# Patient Record
Sex: FEMALE | Race: WHITE | NOT HISPANIC OR LATINO | Employment: OTHER | URBAN - METROPOLITAN AREA
[De-identification: names, ages, dates, MRNs, and addresses within clinical notes are randomized per-mention and may not be internally consistent; named-entity substitution may affect disease eponyms.]

---

## 2017-06-27 ENCOUNTER — HOSPITAL ENCOUNTER (OUTPATIENT)
Dept: FAMILY MEDICINE CLINIC | Facility: CLINIC | Age: 27
Setting detail: SPECIMEN
Discharge: HOME | End: 2017-06-27
Attending: FAMILY MEDICINE | Admitting: FAMILY MEDICINE

## 2017-06-27 LAB
ANION GAP SERPL CALC-SCNC: 11.2 MMOL/L (ref 10–20)
BACTERIA SPEC AEROBE CULT: NORMAL
BACTERIA SPEC AEROBE CULT: NORMAL
BASOPHILS # BLD AUTO: 0 10*3/UL (ref 0–0.2)
BASOPHILS NFR BLD AUTO: 0 % (ref 0–2)
BUN SERPL-MCNC: 11 MG/DL (ref 8–20)
BUN/CREAT SERPL: 11 (ref 5.4–26.2)
CALCIUM SERPL-MCNC: 9.1 MG/DL (ref 8.9–10.3)
CHLORIDE SERPL-SCNC: 104 MMOL/L (ref 101–111)
COLONY COUNT: NORMAL
CONV CO2: 30 MMOL/L (ref 22–32)
CREAT UR-MCNC: 1 MG/DL (ref 0.4–1)
DIFFERENTIAL METHOD BLD: (no result)
EOSINOPHIL # BLD AUTO: 0 10*3/UL (ref 0–0.3)
EOSINOPHIL # BLD AUTO: 1 % (ref 0–3)
ERYTHROCYTE [DISTWIDTH] IN BLOOD BY AUTOMATED COUNT: 13.8 % (ref 11.5–14.5)
GLUCOSE SERPL-MCNC: 79 MG/DL (ref 65–99)
HCT VFR BLD AUTO: 37.2 % (ref 35–49)
HGB BLD-MCNC: 12.7 G/DL (ref 12–15)
LYMPHOCYTES # BLD AUTO: 1.5 10*3/UL (ref 0.8–4.8)
LYMPHOCYTES NFR BLD AUTO: 24 % (ref 18–42)
Lab: NORMAL
MCH RBC QN AUTO: 31 PG (ref 26–32)
MCHC RBC AUTO-ENTMCNC: 34.2 G/DL (ref 32–36)
MCV RBC AUTO: 90.6 FL (ref 80–94)
MICRO REPORT STATUS: NORMAL
MONOCYTES # BLD AUTO: 0.4 10*3/UL (ref 0.1–1.3)
MONOCYTES NFR BLD AUTO: 7 % (ref 2–11)
NEUTROPHILS # BLD AUTO: 4.2 10*3/UL (ref 2.3–8.6)
NEUTROPHILS NFR BLD AUTO: 68 % (ref 50–75)
NRBC BLD AUTO-RTO: 0 /100{WBCS}
NRBC/RBC NFR BLD MANUAL: 0 10*3/UL
PLATELET # BLD AUTO: 351 10*3/UL (ref 150–450)
PMV BLD AUTO: 9.1 FL (ref 7.4–10.4)
POTASSIUM SERPL-SCNC: 4.2 MMOL/L (ref 3.6–5.1)
RBC # BLD AUTO: 4.11 10*6/UL (ref 4–5.4)
SODIUM SERPL-SCNC: 141 MMOL/L (ref 136–144)
SPECIMEN SOURCE: NORMAL
WBC # BLD AUTO: 6.2 10*3/UL (ref 4.5–11.5)

## 2017-11-20 ENCOUNTER — HOSPITAL ENCOUNTER (OUTPATIENT)
Dept: URGENT CARE | Facility: CLINIC | Age: 27
Setting detail: SPECIMEN
Discharge: HOME | End: 2017-11-20
Attending: FAMILY MEDICINE | Admitting: FAMILY MEDICINE

## 2017-11-20 LAB
BACTERIA SPEC AEROBE CULT: NORMAL
C TRACH RRNA SPEC QL PROBE: NORMAL
Lab: NORMAL
MICRO REPORT STATUS: NORMAL
N GONORRHOEA RRNA SPEC QL PROBE: NORMAL
SPECIMEN SOURCE: NORMAL

## 2017-11-21 LAB
CONV HIV-1/ HIV-2: NORMAL
CONV HIV-1/ HIV-2: NORMAL

## 2017-11-22 LAB
HBV SURFACE AG SERPL QL IA: NONREACTIVE
HCV AB SER DONR QL: NORMAL
HCV AB SER DONR QL: NORMAL
HSV1 DNA SPEC QL NAA+PROBE: NEGATIVE
SPECIMEN SOURCE: NORMAL
T PALLIDUM IGG SER QL: NONREACTIVE

## 2017-11-27 LAB — HSV1 IGM TITR SER IF: NEGATIVE {TITER}

## 2017-11-30 ENCOUNTER — HOSPITAL ENCOUNTER (OUTPATIENT)
Dept: FAMILY MEDICINE CLINIC | Facility: CLINIC | Age: 27
Setting detail: SPECIMEN
Discharge: HOME | End: 2017-11-30
Attending: FAMILY MEDICINE | Admitting: FAMILY MEDICINE

## 2017-11-30 LAB
BACTERIA SPEC AEROBE CULT: NORMAL
Lab: NORMAL
MICRO REPORT STATUS: NORMAL
SPECIMEN SOURCE: NORMAL

## 2018-02-08 ENCOUNTER — HOSPITAL ENCOUNTER (OUTPATIENT)
Dept: ORTHOPEDIC SURGERY | Facility: CLINIC | Age: 28
Discharge: HOME | End: 2018-02-08
Attending: ORTHOPAEDIC SURGERY | Admitting: ORTHOPAEDIC SURGERY

## 2018-04-16 ENCOUNTER — HOSPITAL ENCOUNTER (OUTPATIENT)
Dept: INFUSION THERAPY | Facility: HOSPITAL | Age: 28
Discharge: HOME | End: 2018-04-16
Attending: OBSTETRICS & GYNECOLOGY | Admitting: OBSTETRICS & GYNECOLOGY

## 2018-04-25 ENCOUNTER — HOSPITAL ENCOUNTER (OUTPATIENT)
Dept: INFUSION THERAPY | Facility: HOSPITAL | Age: 28
Discharge: HOME | End: 2018-04-25
Attending: OBSTETRICS & GYNECOLOGY | Admitting: OBSTETRICS & GYNECOLOGY

## 2018-05-02 ENCOUNTER — HOSPITAL ENCOUNTER (OUTPATIENT)
Dept: INFUSION THERAPY | Facility: HOSPITAL | Age: 28
Discharge: HOME | End: 2018-05-02
Attending: OBSTETRICS & GYNECOLOGY | Admitting: OBSTETRICS & GYNECOLOGY

## 2019-06-24 RX ORDER — ALPRAZOLAM 0.5 MG/1
TABLET ORAL
Qty: 90 TABLET | Refills: 0 | OUTPATIENT
Start: 2019-06-24

## 2019-07-02 ENCOUNTER — OFFICE VISIT (OUTPATIENT)
Dept: PSYCHIATRY | Facility: CLINIC | Age: 29
End: 2019-07-02

## 2019-07-02 DIAGNOSIS — F20.0 SCHIZOPHRENIA, PARANOID (HCC): Primary | ICD-10-CM

## 2019-07-02 DIAGNOSIS — F63.3 TRICHOTILLOMANIA: ICD-10-CM

## 2019-07-02 DIAGNOSIS — Z91.14 POOR COMPLIANCE WITH MEDICATION: ICD-10-CM

## 2019-07-02 DIAGNOSIS — F41.1 GENERALIZED ANXIETY DISORDER: ICD-10-CM

## 2019-07-02 PROBLEM — Z72.51 HIGH RISK SEXUAL BEHAVIOR: Status: ACTIVE | Noted: 2017-11-20

## 2019-07-02 PROBLEM — R10.13 ABDOMINAL PAIN, EPIGASTRIC: Status: ACTIVE | Noted: 2017-06-20

## 2019-07-02 PROBLEM — Z91.148 POOR COMPLIANCE WITH MEDICATION: Status: ACTIVE | Noted: 2019-02-21

## 2019-07-02 PROBLEM — M25.561 KNEE PAIN, RIGHT: Status: ACTIVE | Noted: 2018-01-23

## 2019-07-02 PROBLEM — F41.9 ANXIETY DISORDER: Status: ACTIVE | Noted: 2018-11-07

## 2019-07-02 PROBLEM — N76.0 VAGINITIS: Status: ACTIVE | Noted: 2017-11-20

## 2019-07-02 PROBLEM — Z30.9 CONTRACEPTIVE MANAGEMENT: Status: ACTIVE | Noted: 2018-01-23

## 2019-07-02 PROBLEM — A60.00 GENITAL HERPES: Status: ACTIVE | Noted: 2017-11-20

## 2019-07-02 PROBLEM — R30.0 DIFFICULT OR PAINFUL URINATION: Status: ACTIVE | Noted: 2017-11-30

## 2019-07-02 PROBLEM — N10 PYELONEPHRITIS, ACUTE: Status: ACTIVE | Noted: 2017-06-20

## 2019-07-02 PROBLEM — N92.6 IRREGULAR MENSTRUATION: Status: ACTIVE | Noted: 2017-11-30

## 2019-07-02 PROBLEM — G89.4 CHRONIC PAIN DISORDER: Status: ACTIVE | Noted: 2017-09-11

## 2019-07-02 PROCEDURE — 99213 OFFICE O/P EST LOW 20 MIN: CPT | Performed by: PHYSICIAN ASSISTANT

## 2019-07-02 RX ORDER — HYDROXYZINE 50 MG/1
50 TABLET, FILM COATED ORAL
COMMUNITY
Start: 2018-07-13 | End: 2019-07-02

## 2019-07-02 RX ORDER — ASENAPINE 10 MG/1
10 TABLET SUBLINGUAL
Qty: 30 TABLET | Refills: 1 | Status: SHIPPED | OUTPATIENT
Start: 2019-07-02 | End: 2019-08-28

## 2019-07-02 RX ORDER — DICLOXACILLIN SODIUM 250 MG/1
250 CAPSULE ORAL 4 TIMES DAILY
COMMUNITY
Start: 2018-07-19 | End: 2021-01-20

## 2019-07-02 RX ORDER — ALPRAZOLAM 0.5 MG/1
0.5 TABLET ORAL 3 TIMES DAILY PRN
Qty: 90 TABLET | Refills: 0 | Status: SHIPPED | OUTPATIENT
Start: 2019-07-02 | End: 2019-09-18 | Stop reason: SDUPTHER

## 2019-07-02 RX ORDER — BUPRENORPHINE HYDROCHLORIDE 8 MG/1
12 TABLET SUBLINGUAL DAILY
COMMUNITY
Start: 2018-08-01 | End: 2019-08-28

## 2019-07-02 RX ORDER — HYDROXYZINE PAMOATE 50 MG/1
1 CAPSULE ORAL EVERY 12 HOURS
COMMUNITY
Start: 2019-06-05 | End: 2021-01-20

## 2019-07-02 NOTE — PROGRESS NOTES
"Subjective   Annmarie Perera is a 28 y.o.white female who presents today for follow up    Chief Complaint:  Anxiety and schizophrenia    History of Present Illness: Paranoid, people keep looking at her, police keep coming to question her   \"The government is dirty people\"  \"People keep playing with my mind over artwork\"  She says she has been taking the Saphris at night and helping some but not reliable  +[facility]    The following portions of the patient's history were reviewed and updated as appropriate: allergies, current medications, past family history, past medical history, past social history, past surgical history and problem list.    PAST PSYCHIATRIC HISTORY  Axis I  Affective/Bipoloar Disorder, Anxiety/Panic Disorder, Substance Abuse Disorder, Schizophrenia  Axis II  None    PAST OUTPATIENT TREATMENT  Diagnosis treated:  Affective Disorder, Anxiety/Panic Disorder, Substance/Alcohol Abuse  Treatment Type:  Intensive Outpatient Therapy, Individual Therapy, Group Therapy  Prior Psychiatric Medications:  lamictal    lexapro  risperidone - she did not like how she was feeling so she stopped all of them   neurontin   prozac, zoloft, lexapro did not like , no emotions  abilify - not sure  clonazepam - effective   vraylar - increased agitation   Palperidone caused hands and arms to draw up    Past Hospitalizations or Residential Treatment   Diagnosis: suicide attempt, major depression, psychosis  Locations\Providers: [facility]   [facility], [facility]leen Cho   [facility] [id number]       Support Groups:  Narcotics Anonymous (NA)  Sequelae Of Mental Disorder:  suicide attempt, job disruption, financial hardships, emotional distress          Interval History  Deteriorated    Side Effects  None       Past Medical History:  Past Medical History:   Diagnosis Date   • Abdominal pain, epigastric 06/20/2017   • Anxiety disorder 11/07/2018   • Borderline personality disorder (CMS/HCC)    • Chronic pain syndrome 09/11/2017   • " Contraceptive management 01/23/2018   • Depression     under care of psych   • Dysuria 11/30/2017   • Genital herpes 11/20/2017   • High risk sexual behavior 11/20/2017   • HPV (human papilloma virus) anogenital infection    • Irregular menstruation 11/30/2017    refer to OB/GYN for further management 11-   • Panic disorder    • Poor compliance with medication 02/21/2019   • Positive skin test for tuberculosis     Tuberculosis positive   • Psychiatric illness    • Pyelonephritis, acute 06/20/2017    Improved 06-   • Right knee pain 01/23/2018    Discussed symptomatic treatment, activity as tolerate. 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/2016   • Vaginitis 11/20/2017   • Withdrawal symptoms, drug or narcotic (CMS/[facility])        Social History:  Social History     Socioeconomic History   • Marital status: Single     Spouse name: Not on file   • Number of children: Not on file   • Years of education: Not on file   • Highest education level: Not on file   Tobacco Use   • Smoking status: Current Every Day Smoker     Types: Cigarettes   • Tobacco comment: Passive Smoke: Y   Substance and Sexual Activity   • Alcohol use: No     Frequency: Never   • Drug use: Yes     Types: Cocaine   • Sexual activity: No       Family History:  History reviewed. No pertinent family history.    Past Surgical History:  Past Surgical History:   Procedure Laterality Date   • PAP SMEAR  2017    [facility]       Problem List:  Patient Active Problem List   Diagnosis   • Abdominal pain, epigastric   • Anxiety disorder   • Chronic pain disorder   • Contraceptive management   • Difficult or painful urination   • Genital herpes   • High risk sexual behavior   • Irregular menstruation   • Knee pain, right   • Poor compliance with medication   • Pyelonephritis,  acute   • Vaginitis   • Schizophrenia, paranoid (CMS/HCC)   • Trichotillomania       Allergy:   Allergies   Allergen Reactions   • Haldol [Haloperidol] Other (See Comments) and Mental Status Change     [facility] [place]         Discontinued Medications:  Medications Discontinued During This Encounter   Medication Reason   • hydrOXYzine (ATARAX) 50 MG tablet *Error   • asenapine maleate (SAPHRIS) 5 MG sublingual tablet sublingual tablet Dose adjustment   • ALPRAZolam (XANAX) 0.5 MG tablet Reorder       Current Medications:   Current Outpatient Medications   Medication Sig Dispense Refill   • ALPRAZolam (XANAX) 0.5 MG tablet Take 1 tablet by mouth 3 (Three) Times a Day As Needed for Anxiety (take one tablet three times as needed for anxiety). 90 tablet 0   • asenapine maleate (SAPHRIS) 10 MG sublingual tablet sublingual tablet Place 1 tablet under the tongue every night at bedtime. 30 tablet 1   • buprenorphine (SUBUTEX) 8 MG sublingual tablet SL tablet Place 12 mg under the tongue Daily.     • Desvenlafaxine Succinate ER (PRISTIQ) 25 MG tablet sustained-release 24 hour Take 25 mg by mouth Daily. Take one tablet daily for depression and anxiety     • dicloxacillin (DYNAPEN) 250 MG capsule Take 250 mg by mouth 4 (Four) Times a Day.     • hydrOXYzine pamoate (VISTARIL) 50 MG capsule 1 capsule Every 12 (Twelve) Hours.     • valACYclovir (VALTREX) 1000 MG tablet Take  by mouth Daily. Take one tablet by mouth daily       No current facility-administered medications for this visit.          Review of Symptoms:    Psychiatric/Behavioral: Negative for agitation, behavioral problems, confusion, decreased concentration, dysphoric mood, hallucinations, self-injury, sleep disturbance and suicidal ideas. The patient is not nervous/anxious and is not hyperactive.        Physical Exam:   There were no vitals taken for this visit.    Mental Status Exam:   Hygiene:   fair  Cooperation:  Suspicious  Eye Contact:   Good  Psychomotor Behavior:  Restless  Affect:  Blunted  Mood: irritable  Hopelessness: Denies  Speech:  Normal  Thought Process:  Goal directed  Thought Content:  Paranoid  Suicidal:  None  Homicidal:  None  Hallucinations:  Auditory  Delusion:  Paranoid  Memory:  Intact  Orientation:  Person, Place, Time and Situation  Reliability:  poor  Insight:  Poor  Judgement:  Poor  Impulse Control:  Fair  Physical/Medical Issues:  No      PHQ-9 Score:  PHQ-9 Score: 13      Current every day smoker less than 3 minutes spent counseling Has reduced Tobbacos use    I advised Annmarie of the risks of tobacco use.     Lab Results:   No visits with results within 3 Month(s) from this visit.   Latest known visit with results is:   Hospital Outpatient Visit on 11/30/2017   Component Date Value Ref Range Status   • Specimen Description: 11/30/2017 URINE   Final   • Special Requests 11/30/2017 SPEC REFRIG   Final   • Culture 11/30/2017 NO GROWTH 2 DAYS   Final   • Report Status 11/30/2017 12/02/2017 FINAL   Final       Assessment/Plan   Problems Addressed this Visit        Other    Anxiety disorder    Relevant Medications    hydrOXYzine pamoate (VISTARIL) 50 MG capsule    asenapine maleate (SAPHRIS) 10 MG sublingual tablet sublingual tablet    ALPRAZolam (XANAX) 0.5 MG tablet    Poor compliance with medication    Relevant Medications    asenapine maleate (SAPHRIS) 10 MG sublingual tablet sublingual tablet    Schizophrenia, paranoid (CMS/HCC) - Primary    Relevant Medications    hydrOXYzine pamoate (VISTARIL) 50 MG capsule    asenapine maleate (SAPHRIS) 10 MG sublingual tablet sublingual tablet    ALPRAZolam (XANAX) 0.5 MG tablet    Trichotillomania    Relevant Medications    hydrOXYzine pamoate (VISTARIL) 50 MG capsule    asenapine maleate (SAPHRIS) 10 MG sublingual tablet sublingual tablet    ALPRAZolam (XANAX) 0.5 MG tablet          Visit Diagnoses:    ICD-10-CM ICD-9-CM   1. Schizophrenia, paranoid (CMS/HCC) F20.0 295.30   2.  Trichotillomania F63.3 312.39   3. Poor compliance with medication Z91.14 V15.81   4. Generalized anxiety disorder F41.1 300.02       TREATMENT PLAN/GOALS: Continue supportive psychotherapy efforts and medications as indicated. Treatment and medication options discussed during today's visit. Patient ackowledged and verbally consented to continue with current treatment plan and was educated on the importance of compliance with treatment and follow-up appointments.    Patient is cooperative but having conversation with someone else, she says she is talking to herself.  She reports that she has been compliant with the Saphris at night, but not sure that this is reliable.  She would benefit from an injectable antipsychotic but she cannot take Abilify or Invega due to reactions in the past.  We will increase the Saphris to 10 mg at bedtime sublingual and refill Xanax only once until she is seen again in 1 month.  Oral swab done today for drug screen.  May consider a trial of Haldol, then later switched to Haldol decanoate injection.      MEDICATION ISSUES:  INSPECT reviewed as expected  Discussed medication options and treatment plan of prescribed medication as well as the risks, benefits, and side effects including potential falls, possible impaired driving and metabolic adversities among others. Patient is agreeable to call the office with any worsening of symptoms or onset of side effects. Patient is agreeable to call 911 or go to the nearest ER should he/she begin having SI/HI. No medication side effects or related complaints today.     MEDS ORDERED DURING VISIT:  New Medications Ordered This Visit   Medications   • asenapine maleate (SAPHRIS) 10 MG sublingual tablet sublingual tablet     Sig: Place 1 tablet under the tongue every night at bedtime.     Dispense:  30 tablet     Refill:  1   • ALPRAZolam (XANAX) 0.5 MG tablet     Sig: Take 1 tablet by mouth 3 (Three) Times a Day As Needed for Anxiety (take one tablet  three times as needed for anxiety).     Dispense:  90 tablet     Refill:  0       Return in about 4 weeks (around 7/30/2019) for Recheck.         This document has been electronically signed by Batool Carter PA-C  July 2, 2019 2:15 PM

## 2019-07-22 ENCOUNTER — TELEPHONE (OUTPATIENT)
Dept: PSYCHIATRY | Facility: CLINIC | Age: 29
End: 2019-07-22

## 2019-08-01 DIAGNOSIS — F41.1 GENERALIZED ANXIETY DISORDER: ICD-10-CM

## 2019-08-01 DIAGNOSIS — F63.3 TRICHOTILLOMANIA: ICD-10-CM

## 2019-08-04 RX ORDER — ALPRAZOLAM 0.5 MG/1
TABLET ORAL
Qty: 90 TABLET | Refills: 0 | OUTPATIENT
Start: 2019-08-04

## 2019-08-07 DIAGNOSIS — F41.1 GENERALIZED ANXIETY DISORDER: ICD-10-CM

## 2019-08-07 DIAGNOSIS — F63.3 TRICHOTILLOMANIA: ICD-10-CM

## 2019-08-07 RX ORDER — ALPRAZOLAM 0.5 MG/1
TABLET ORAL
Qty: 90 TABLET | Refills: 0 | OUTPATIENT
Start: 2019-08-07

## 2019-08-09 ENCOUNTER — TELEPHONE (OUTPATIENT)
Dept: PSYCHIATRY | Facility: CLINIC | Age: 29
End: 2019-08-09

## 2019-08-09 NOTE — TELEPHONE ENCOUNTER
RODRIGO DAO FROM [employer]  REACHED OUT TO CONTACT PATIENT SINCE HER DISCHARGE FROM [facility] July 19.  PATIENT HAD RAPID SPEECH, DOES NOT UNDERSTAND WHY THE POLICE KEEP COMING TO HER HOUSE SINCE THEY'VE TAKEN HER CHILDREN AWAY.  SHE IS STAYING WITH HER PARENTS, ATTENDING COUNSELING, AND AGREED TO A  TO BE ASSIGNED TO HER

## 2019-08-09 NOTE — TELEPHONE ENCOUNTER
PATIENT CALLED UPSET POLCE COMING TO HOUSE, THEY'VE ALREADY TAKEN 2 OF HER CHILDREN, SHE'S NOT DOING ANYTHING WRONG, SHE IS TAKING HER MEDS BUT STILL HEARS VOICES, SHE THINKS THE POLICE THINKS SHE IS A PSYCHO PATH BUT SHE'S NOT, SHE DID SAY SHE IS NOT WANTING TO HURT HER SELF OR OTHERS.  CALLED WANTED YOU TO BE AWARE OF WHAT IS GOING ON

## 2019-08-12 NOTE — TELEPHONE ENCOUNTER
Please see if you can get ahold of the rep from Medicaid.  I am not sure what to do with Annmarie b/c she is not compliant with coming in for follow ups, drug screens or taking her medications.

## 2019-08-14 ENCOUNTER — TELEPHONE (OUTPATIENT)
Dept: PSYCHIATRY | Facility: CLINIC | Age: 29
End: 2019-08-14

## 2019-08-15 ENCOUNTER — TELEPHONE (OUTPATIENT)
Dept: PSYCHIATRY | Facility: CLINIC | Age: 29
End: 2019-08-15

## 2019-08-15 NOTE — TELEPHONE ENCOUNTER
I spoke with Jeremiah Romano [employer] at [phone]   she will try to reach out to the patient and encourage her to be compliant and come for her appointments. Jeremiah will also  introduce her to [employer] for transportation to and from appointments and help set up outpatient therapy other than that she doesn't know what else she can do. But she will follow up

## 2019-08-21 ENCOUNTER — TELEPHONE (OUTPATIENT)
Dept: PSYCHIATRY | Facility: CLINIC | Age: 29
End: 2019-08-21

## 2019-08-21 DIAGNOSIS — F41.1 GENERALIZED ANXIETY DISORDER: ICD-10-CM

## 2019-08-21 DIAGNOSIS — F63.3 TRICHOTILLOMANIA: ICD-10-CM

## 2019-08-22 RX ORDER — ALPRAZOLAM 0.5 MG/1
TABLET ORAL
Qty: 90 TABLET | Refills: 0 | OUTPATIENT
Start: 2019-08-22

## 2019-08-27 PROBLEM — F11.93 ACUTE OPIOID WITHDRAWAL: Status: ACTIVE | Noted: 2018-07-09

## 2019-08-27 PROBLEM — Z72.0 TOBACCO USE: Status: ACTIVE | Noted: 2018-07-10

## 2019-08-27 PROBLEM — F20.9 SCHIZOPHRENIA: Status: ACTIVE | Noted: 2018-07-10

## 2019-08-28 ENCOUNTER — OFFICE VISIT (OUTPATIENT)
Dept: PSYCHIATRY | Facility: CLINIC | Age: 29
End: 2019-08-28

## 2019-08-28 DIAGNOSIS — F41.1 GENERALIZED ANXIETY DISORDER: ICD-10-CM

## 2019-08-28 DIAGNOSIS — Z91.14 POOR COMPLIANCE WITH MEDICATION: ICD-10-CM

## 2019-08-28 DIAGNOSIS — F63.3 TRICHOTILLOMANIA: Primary | ICD-10-CM

## 2019-08-28 DIAGNOSIS — F20.0 SCHIZOPHRENIA, PARANOID (HCC): ICD-10-CM

## 2019-08-28 PROCEDURE — 99213 OFFICE O/P EST LOW 20 MIN: CPT | Performed by: PHYSICIAN ASSISTANT

## 2019-08-28 RX ORDER — ASENAPINE 10 MG/1
10 TABLET SUBLINGUAL 2 TIMES DAILY
Qty: 60 TABLET | Refills: 1 | Status: SHIPPED | OUTPATIENT
Start: 2019-08-28 | End: 2021-01-20

## 2019-08-28 NOTE — PROGRESS NOTES
"Subjective   Annmarie Perera is a 28 y.o.white female who presents today for follow up    Chief Complaint:  Hallucinations, paranoia    History of Present Illness:   CPS is \"harassing her\" about the baby  Ghosts are talking to her, \"want me to play with myself\"  Positive auditory and visual hallucinations  Irritable but no aggression  No Si/HI  C/o anxiety  Says she is taking the Saphris as directed with the Vistaril b/c \"they are making me\", stopped the Pristiq, says it wasn't helping      The following portions of the patient's history were reviewed and updated as appropriate: allergies, current medications, past family history, past medical history, past social history, past surgical history and problem list.    PAST PSYCHIATRIC HISTORY  Axis I  Affective/Bipoloar Disorder, Anxiety/Panic Disorder, Substance Abuse Disorder, Cognitive Disorder  Axis II  None    PAST OUTPATIENT TREATMENT  Diagnosis treated:  Affective Disorder, Addictive Disorder, Cognitive Disorder, Substance/Alcohol Abuse  Treatment Type:  Intensive Outpatient Therapy, Individual Therapy, Group Therapy, Medication Management  Prior Psychiatric Medications:  lamictal    lexapro  risperidone - she did not like how she was feeling so she stopped all of them   neurontin   prozac, zoloft, lexapro did not like , no emotions  abilify - not sure  clonazepam - effective   vraylar - increased agitation   Palperidone caused hands and arms to draw up  Support Groups:  Narcotics Anonymous (NA)  Sequelae Of Mental Disorder:  suicide attempt, arrest, social isolation, family disruption, financial hardships, emotional distress      Interval History  Deteriorated    Side Effects  None      Past Medical History:  Past Medical History:   Diagnosis Date   • Abdominal pain, epigastric 06/20/2017   • Anxiety disorder 11/07/2018   • Borderline personality disorder (CMS/HCC)    • Chronic pain syndrome 09/11/2017   • Contraceptive management 01/23/2018   • Depression     " under care of psych   • Dysuria 11/30/2017   • Genital herpes 11/20/2017   • High risk sexual behavior 11/20/2017   • HPV (human papilloma virus) anogenital infection    • Irregular menstruation 11/30/2017    refer to OB/GYN for further management 11-   • Panic disorder    • Poor compliance with medication 02/21/2019   • Positive skin test for tuberculosis     Tuberculosis positive   • Psychiatric illness    • Pyelonephritis, acute 06/20/2017    Improved 06-   • Right knee pain 01/23/2018    Discussed symptomatic treatment, activity as tolerate. 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   • Vaginitis 11/20/2017   • Withdrawal symptoms, drug or narcotic (CMS/[facility])        Social History:  Social History     Socioeconomic History   • Marital status: Single     Spouse name: Not on file   • Number of children: Not on file   • Years of education: Not on file   • Highest education level: Not on file   Tobacco Use   • Smoking status: Current Every Day Smoker     Types: Cigarettes   • Tobacco comment: Passive Smoke: Y   Substance and Sexual Activity   • Alcohol use: No     Frequency: Never   • Drug use: Yes     Types: Cocaine   • Sexual activity: No       Family History:  History reviewed. No pertinent family history.    Past Surgical History:  Past Surgical History:   Procedure Laterality Date   • PAP SMEAR  2017    [facility]       Problem List:  Patient Active Problem List   Diagnosis   • Abdominal pain, epigastric   • Anxiety disorder   • Chronic pain disorder   • Contraceptive management   • Difficult or painful urination   • Genital herpes   • High risk sexual behavior   • Irregular menstruation   • Knee pain, right   • Poor compliance with medication   • Pyelonephritis, acute   • Vaginitis   • Schizophrenia, paranoid (CMS/HCC)    • Trichotillomania   • Tobacco use   • Acute opioid withdrawal (CMS/HCC)   • Schizophrenia (CMS/HCC)       Allergy:   Allergies   Allergen Reactions   • Haldol [Haloperidol] Other (See Comments) and Mental Status Change     [facility]         Discontinued Medications:  Medications Discontinued During This Encounter   Medication Reason   • buprenorphine (SUBUTEX) 8 MG sublingual tablet SL tablet *Therapy completed   • asenapine maleate (SAPHRIS) 10 MG sublingual tablet sublingual tablet        Current Medications:   Current Outpatient Medications   Medication Sig Dispense Refill   • ALPRAZolam (XANAX) 0.5 MG tablet Take 1 tablet by mouth 3 (Three) Times a Day As Needed for Anxiety (take one tablet three times as needed for anxiety). 90 tablet 0   • asenapine maleate (SAPHRIS) 10 MG sublingual tablet sublingual tablet Place 1 tablet under the tongue 2 (Two) Times a Day. 60 tablet 1   • Desvenlafaxine Succinate ER (PRISTIQ) 25 MG tablet sustained-release 24 hour Take 25 mg by mouth Daily. Take one tablet daily for depression and anxiety     • dicloxacillin (DYNAPEN) 250 MG capsule Take 250 mg by mouth 4 (Four) Times a Day.     • hydrOXYzine pamoate (VISTARIL) 50 MG capsule 1 capsule Every 12 (Twelve) Hours.     • valACYclovir (VALTREX) 1000 MG tablet Take  by mouth Daily. Take one tablet by mouth daily       No current facility-administered medications for this visit.          Review of Symptoms:    Psychiatric/Behavioral: Negative for agitation, behavioral problems, confusion, decreased concentration, dysphoric mood, hallucinations, self-injury, sleep disturbance and suicidal ideas. The patient is not nervous/anxious and is not hyperactive.        Physical Exam:   There were no vitals taken for this visit.    Mental Status Exam:   Hygiene:   fair  Cooperation:  Suspicious  Eye Contact:  Fair  Psychomotor Behavior:  Restless  Affect:  Restricted  Mood: anxious  Hopelessness: Denies  Speech:   Normal  Thought Process:  Goal directed  Thought Content:  Bizarre  Suicidal:  None  Homicidal:  None  Hallucinations:  Auditory and Visual  Delusion:  Paranoid  Memory:  Intact  Orientation:  Person, Place, Time and Situation  Reliability:  fair  Insight:  Poor  Judgement:  Poor  Impulse Control:  Fair  Physical/Medical Issues:  No        PHQ-9 Depression Screening  Little interest or pleasure in doing things?     Feeling down, depressed, or hopeless?     Trouble falling or staying asleep, or sleeping too much?     Feeling tired or having little energy?     Poor appetite or overeating?     Feeling bad about yourself - or that you are a failure or have let yourself or your family down?     Trouble concentrating on things, such as reading the newspaper or watching television?     Moving or speaking so slowly that other people could have noticed? Or the opposite - being so fidgety or restless that you have been moving around a lot more than usual?     Thoughts that you would be better off dead, or of hurting yourself in some way?     PHQ-9 Total Score     If you checked off any problems, how difficult have these problems made it for you to do your work, take care of things at home, or get along with other people?             Current every day smoker less than 3 minutes spent counseling Not agreeable to stopping    I advised Annmarie of the risks of tobacco use.     Lab Results:   No visits with results within 3 Month(s) from this visit.   Latest known visit with results is:   Hospital Outpatient Visit on 11/30/2017   Component Date Value Ref Range Status   • Specimen Description: 11/30/2017 URINE   Final   • Special Requests 11/30/2017 SPEC REFRIG   Final   • Culture 11/30/2017 NO GROWTH 2 DAYS   Final   • Report Status 11/30/2017 12/02/2017 FINAL   Final       Assessment/Plan   Problems Addressed this Visit        Other    Anxiety disorder    Relevant Medications    asenapine maleate (SAPHRIS) 10 MG sublingual tablet  sublingual tablet    Poor compliance with medication    Relevant Medications    asenapine maleate (SAPHRIS) 10 MG sublingual tablet sublingual tablet    Schizophrenia, paranoid (CMS/HCC)    Relevant Medications    asenapine maleate (SAPHRIS) 10 MG sublingual tablet sublingual tablet    Trichotillomania - Primary    Relevant Medications    asenapine maleate (SAPHRIS) 10 MG sublingual tablet sublingual tablet          Visit Diagnoses:    ICD-10-CM ICD-9-CM   1. Trichotillomania F63.3 312.39   2. Schizophrenia, paranoid (CMS/HCC) F20.0 295.30   3. Poor compliance with medication Z91.14 V15.81   4. Generalized anxiety disorder F41.1 300.02       TREATMENT PLAN/GOALS: Continue supportive psychotherapy efforts and medications as indicated. Treatment and medication options discussed during today's visit. Patient ackowledged and verbally consented to continue with current treatment plan and was educated on the importance of compliance with treatment and follow-up appointments.    MEDICATION ISSUES:  INSPECT reviewed as expected  Discussed medication options and treatment plan of prescribed medication as well as the risks, benefits, and side effects including potential falls, possible impaired driving and metabolic adversities among others. Patient is agreeable to call the office with any worsening of symptoms or onset of side effects. Patient is agreeable to call 911 or go to the nearest ER should he/she begin having SI/HI. No medication side effects or related complaints today.     Urine drug screen done today.  Patient would benefit from hospitalization, advised her to go to [facility] or [place] for inpatient but she refused.  Her mom was waiting in the car, so I tried to call her cell to discuss taking her, but she did not answer.  I asked Annmarie to have her mom come up to talk to me, but she did not return.   Increased Saphris 10mg to BID and recheck in two weeks.      MEDS ORDERED DURING VISIT:  New Medications Ordered  This Visit   Medications   • asenapine maleate (SAPHRIS) 10 MG sublingual tablet sublingual tablet     Sig: Place 1 tablet under the tongue 2 (Two) Times a Day.     Dispense:  60 tablet     Refill:  1       Return in about 2 weeks (around 9/11/2019).         This document has been electronically signed by Batool Carter PA-C  August 28, 2019 9:45 PM

## 2019-09-06 DIAGNOSIS — F41.1 GENERALIZED ANXIETY DISORDER: ICD-10-CM

## 2019-09-06 DIAGNOSIS — F63.3 TRICHOTILLOMANIA: ICD-10-CM

## 2019-09-06 RX ORDER — ALPRAZOLAM 0.5 MG/1
0.5 TABLET ORAL 3 TIMES DAILY PRN
Qty: 90 TABLET | Refills: 0 | OUTPATIENT
Start: 2019-09-06

## 2019-09-06 RX ORDER — ALPRAZOLAM 0.5 MG/1
TABLET ORAL
Qty: 90 TABLET | Refills: 0 | OUTPATIENT
Start: 2019-09-06

## 2019-09-06 NOTE — TELEPHONE ENCOUNTER
Pt called to request refill on Xanax. Stated she requested it through pharmacy but there is no request in chart.   Last seen 8/28/19  Next visit 9/18/19

## 2019-09-16 DIAGNOSIS — F63.3 TRICHOTILLOMANIA: ICD-10-CM

## 2019-09-16 DIAGNOSIS — F41.1 GENERALIZED ANXIETY DISORDER: ICD-10-CM

## 2019-09-16 RX ORDER — ALPRAZOLAM 0.5 MG/1
0.5 TABLET ORAL 3 TIMES DAILY PRN
Qty: 90 TABLET | Refills: 0 | OUTPATIENT
Start: 2019-09-16

## 2019-09-18 ENCOUNTER — OFFICE VISIT (OUTPATIENT)
Dept: PSYCHIATRY | Facility: CLINIC | Age: 29
End: 2019-09-18

## 2019-09-18 DIAGNOSIS — F63.3 TRICHOTILLOMANIA: ICD-10-CM

## 2019-09-18 DIAGNOSIS — F20.0 SCHIZOPHRENIA, PARANOID (HCC): Primary | ICD-10-CM

## 2019-09-18 DIAGNOSIS — F41.1 GENERALIZED ANXIETY DISORDER: ICD-10-CM

## 2019-09-18 PROCEDURE — 99213 OFFICE O/P EST LOW 20 MIN: CPT | Performed by: PHYSICIAN ASSISTANT

## 2019-09-18 RX ORDER — ALPRAZOLAM 0.5 MG/1
0.5 TABLET ORAL 3 TIMES DAILY PRN
Qty: 21 TABLET | Refills: 0 | Status: SHIPPED | OUTPATIENT
Start: 2019-09-18 | End: 2021-01-20

## 2019-09-18 NOTE — PROGRESS NOTES
"Subjective   Annmarie Perera is a 28 y.o.white female who presents today for follow up    Chief Complaint:  Anxiety, hearing voices    History of Present Illness:   Her dad is with her today, concerned about her mental health getting worse, talks constantly to people that aren't there, Xanax is the only thing that calms her.  [facility] is helping her to get admitted to [facility] when bed available.  She is depressed but denies Si/HI  Talking about finding out her \"other\" dad is a convicted felon, still very paranoid    The following portions of the patient's history were reviewed and updated as appropriate: allergies, current medications, past family history, past medical history, past social history, past surgical history and problem list.    PAST PSYCHIATRIC HISTORY  Axis I  Affective/Bipoloar Disorder, Anxiety/Panic Disorder, Cognitive Disorder  Axis II  None,     PAST OUTPATIENT TREATMENT  Diagnosis treated:  Affective Disorder, Anxiety/Panic Disorder, Cognitive Disorder  Treatment Type:  Individual Therapy, Group Therapy, Medication Management  Prior Psychiatric Medications:  Pristiq  Saphris  lamictal    lexapro  risperidone - she did not like how she was feeling so she stopped all of them   neurontin   prozac, zoloft, lexapro did not like , no emotions  abilify - not sure  clonazepam - effective   vraylar - increased agitation   Palperidone caused hands and arms to draw up  Support Groups:  Narcotics Anonymous (NA)  Sequelae Of Mental Disorder:  suicide attempt, arrest, social isolation, family disruption, financial hardships, emotional distress          Interval History  Deteriorated    Side Effects  None      Past Medical History:  Past Medical History:   Diagnosis Date   • Abdominal pain, epigastric 06/20/2017   • Anxiety disorder 11/07/2018   • Borderline personality disorder (CMS/HCC)    • Chronic pain syndrome 09/11/2017   • Contraceptive management 01/23/2018   • Depression     under care " of psych   • Dysuria 11/30/2017   • Genital herpes 11/20/2017   • High risk sexual behavior 11/20/2017   • HPV (human papilloma virus) anogenital infection    • Irregular menstruation 11/30/2017    refer to OB/GYN for further management 11-   • Panic disorder    • Poor compliance with medication 02/21/2019   • Positive skin test for tuberculosis     Tuberculosis positive   • Psychiatric illness    • Pyelonephritis, acute 06/20/2017    Improved 06-   • Right knee pain 01/23/2018    Discussed symptomatic treatment, activity as tolerate. 01-   • Schizoaffective disorder (CMS/HCC)    • Schizophrenia, paranoid (CMS/HCC) 07/14/2016    deteriorated 04-   • Substance abuse (CMS/[facility])    • Trichotillomania 07/14/2016   • Vaginitis 11/20/2017   • Withdrawal symptoms, drug or narcotic (CMS/[facility])        Social History:  Social History     Socioeconomic History   • Marital status: Single     Spouse name: Not on file   • Number of children: Not on file   • Years of education: Not on file   • Highest education level: Not on file   Tobacco Use   • Smoking status: Current Every Day Smoker     Types: Cigarettes   • Tobacco comment: Passive Smoke: Y   Substance and Sexual Activity   • Alcohol use: No     Frequency: Never   • Drug use: Yes     Types: Cocaine   • Sexual activity: No       Family History:  History reviewed. No pertinent family history.    Past Surgical History:  Past Surgical History:   Procedure Laterality Date   • PAP SMEAR  2017    [facility]       Problem List:  Patient Active Problem List   Diagnosis   • Abdominal pain, epigastric   • Anxiety disorder   • Chronic pain disorder   • Contraceptive management   • Difficult or painful urination   • Genital herpes   • High risk sexual behavior   • Irregular menstruation   • Knee pain, right   • Poor compliance with medication   • Pyelonephritis, acute   • Vaginitis   • Schizophrenia, paranoid (CMS/HCC)   •  Trichotillomania   • Tobacco use   • Acute opioid withdrawal (CMS/HCC)   • Schizophrenia (CMS/HCC)       Allergy:   Allergies   Allergen Reactions   • Haldol [Haloperidol] Other (See Comments) and Mental Status Change     [facility]         Discontinued Medications:  Medications Discontinued During This Encounter   Medication Reason   • ALPRAZolam (XANAX) 0.5 MG tablet Reorder       Current Medications:   Current Outpatient Medications   Medication Sig Dispense Refill   • ALPRAZolam (XANAX) 0.5 MG tablet Take 1 tablet by mouth 3 (Three) Times a Day As Needed for Anxiety (take one tablet three times as needed for anxiety). 21 tablet 0   • asenapine maleate (SAPHRIS) 10 MG sublingual tablet sublingual tablet Place 1 tablet under the tongue 2 (Two) Times a Day. 60 tablet 1   • Desvenlafaxine Succinate ER (PRISTIQ) 25 MG tablet sustained-release 24 hour Take 25 mg by mouth Daily. Take one tablet daily for depression and anxiety     • dicloxacillin (DYNAPEN) 250 MG capsule Take 250 mg by mouth 4 (Four) Times a Day.     • hydrOXYzine pamoate (VISTARIL) 50 MG capsule 1 capsule Every 12 (Twelve) Hours.     • valACYclovir (VALTREX) 1000 MG tablet Take  by mouth Daily. Take one tablet by mouth daily       No current facility-administered medications for this visit.          Review of Symptoms:    Psychiatric/Behavioral: Negative for agitation, behavioral problems, confusion, decreased concentration, dysphoric mood, hallucinations, self-injury, sleep disturbance and suicidal ideas. The patient is not nervous/anxious and is not hyperactive.        Physical Exam:   There were no vitals taken for this visit.    Mental Status Exam:   Hygiene:   fair  Cooperation:  Suspicious  Eye Contact:  Fair  Psychomotor Behavior:  Aggitated  Affect:  Restricted  Mood: irritable  Hopelessness: Denies  Speech:  Rambling  Thought Process:  Goal directed  Thought Content:  Bizarre  Suicidal:  None  Homicidal:   None  Hallucinations:  Auditory  Delusion:  Paranoid  Memory:  Deficits  Orientation:  Person, Place, Time and Situation  Reliability:  poor  Insight:  Poor  Judgement:  Poor  Impulse Control:  Fair  Physical/Medical Issues:  No        PHQ-9 Depression Screening  Little interest or pleasure in doing things? 2   Feeling down, depressed, or hopeless? 2   Trouble falling or staying asleep, or sleeping too much? 3   Feeling tired or having little energy? 1   Poor appetite or overeating? 1   Feeling bad about yourself - or that you are a failure or have let yourself or your family down? 1   Trouble concentrating on things, such as reading the newspaper or watching television? 3   Moving or speaking so slowly that other people could have noticed? Or the opposite - being so fidgety or restless that you have been moving around a lot more than usual? 1   Thoughts that you would be better off dead, or of hurting yourself in some way? 1   PHQ-9 Total Score 15   If you checked off any problems, how difficult have these problems made it for you to do your work, take care of things at home, or get along with other people? Very difficult           Current every day smoker less than 3 minutes spent counseling Not agreeable to stopping    I advised Annmarie of the risks of tobacco use.     Lab Results:   No visits with results within 3 Month(s) from this visit.   Latest known visit with results is:   Hospital Outpatient Visit on 11/30/2017   Component Date Value Ref Range Status   • Specimen Description: 11/30/2017 URINE   Final   • Special Requests 11/30/2017 SPEC REFRIG   Final   • Culture 11/30/2017 NO GROWTH 2 DAYS   Final   • Report Status 11/30/2017 12/02/2017 FINAL   Final       Assessment/Plan   Problems Addressed this Visit        Other    Anxiety disorder    Relevant Medications    ALPRAZolam (XANAX) 0.5 MG tablet    Schizophrenia, paranoid (CMS/HCC) - Primary    Relevant Medications    ALPRAZolam (XANAX) 0.5 MG tablet     Trichotillomania    Relevant Medications    ALPRAZolam (XANAX) 0.5 MG tablet          Visit Diagnoses:    ICD-10-CM ICD-9-CM   1. Schizophrenia, paranoid (CMS/HCC) F20.0 295.30   2. Trichotillomania F63.3 312.39   3. Generalized anxiety disorder F41.1 300.02       TREATMENT PLAN/GOALS: Continue supportive psychotherapy efforts and medications as indicated. Treatment and medication options discussed during today's visit. Patient ackowledged and verbally consented to continue with current treatment plan and was educated on the importance of compliance with treatment and follow-up appointments.    MEDICATION ISSUES:  INSPECT reviewed as expected  Discussed medication options and treatment plan of prescribed medication as well as the risks, benefits, and side effects including potential falls, possible impaired driving and metabolic adversities among others. Patient is agreeable to call the office with any worsening of symptoms or onset of side effects. Patient is agreeable to call 911 or go to the nearest ER should he/she begin having SI/HI. No medication side effects or related complaints today.     Patient continues to decline.  She has had no threats of harm to herself or to anyone else but hallucinations and paranoia worsening.  Her dad says that [facility] is working to get her admitted to [facility], which will be against her will.  He is POA.  She says that she is taking the Saphris but this seems doubtful.  She has had side effects or reports not efficacious with numerous medications.  Urine drug screen done today  We will send 1 week supply of Xanax to take until the drug screen is back.  If solicit will send a refill and will call [facility] to discuss the status possible admission.    MEDS ORDERED DURING VISIT:  New Medications Ordered This Visit   Medications   • ALPRAZolam (XANAX) 0.5 MG tablet     Sig: Take 1 tablet by mouth 3 (Three) Times a Day As Needed for Anxiety (take one tablet three  times as needed for anxiety).     Dispense:  21 tablet     Refill:  0       Return in about 3 months (around 12/18/2019).         This document has been electronically signed by Batool Carter PA-C  September 19, 2019 9:15 AM

## 2019-09-25 ENCOUNTER — TELEPHONE (OUTPATIENT)
Dept: PSYCHIATRY | Facility: CLINIC | Age: 29
End: 2019-09-25

## 2019-09-25 NOTE — TELEPHONE ENCOUNTER
Please call Annmarie's dad and let him know that her drug screen was positive for cocaine again, so I cannot refill her Xanax at this time.

## 2020-11-07 DIAGNOSIS — F41.1 GENERALIZED ANXIETY DISORDER: ICD-10-CM

## 2020-11-07 DIAGNOSIS — F63.3 TRICHOTILLOMANIA: ICD-10-CM

## 2020-11-07 RX ORDER — ALPRAZOLAM 0.5 MG/1
TABLET ORAL
Qty: 21 TABLET | Refills: 0 | OUTPATIENT
Start: 2020-11-07

## 2020-12-25 ENCOUNTER — HOSPITAL ENCOUNTER (EMERGENCY)
Facility: HOSPITAL | Age: 30
Discharge: HOME | End: 2020-12-25
Attending: EMERGENCY MEDICINE | Admitting: EMERGENCY MEDICINE

## 2020-12-25 VITALS
OXYGEN SATURATION: 95 % | DIASTOLIC BLOOD PRESSURE: 59 MMHG | WEIGHT: 186 LBS | BODY MASS INDEX: 29.19 KG/M2 | SYSTOLIC BLOOD PRESSURE: 115 MMHG | TEMPERATURE: 98.2 F | HEART RATE: 112 BPM | HEIGHT: 67 IN | RESPIRATION RATE: 18 BRPM

## 2020-12-25 DIAGNOSIS — R10.2 PELVIC PAIN: Primary | ICD-10-CM

## 2020-12-25 LAB
ALBUMIN SERPL-MCNC: 4.7 G/DL (ref 3.5–5.2)
ALBUMIN/GLOB SERPL: 2 G/DL
ALP SERPL-CCNC: 83 U/L (ref 39–117)
ALT SERPL W P-5'-P-CCNC: 11 U/L (ref 1–33)
ANION GAP SERPL CALCULATED.3IONS-SCNC: 10 MMOL/L (ref 5–15)
AST SERPL-CCNC: 16 U/L (ref 1–32)
B-HCG UR QL: NEGATIVE
BACTERIA UR QL AUTO: ABNORMAL /HPF
BASOPHILS # BLD AUTO: 0.1 10*3/MM3 (ref 0–0.2)
BASOPHILS NFR BLD AUTO: 1.2 % (ref 0–1.5)
BILIRUB SERPL-MCNC: 1 MG/DL (ref 0–1.2)
BILIRUB UR QL STRIP: ABNORMAL
BUN SERPL-MCNC: 18 MG/DL (ref 6–20)
BUN/CREAT SERPL: 20 (ref 7–25)
CALCIUM SPEC-SCNC: 9.3 MG/DL (ref 8.6–10.5)
CHLORIDE SERPL-SCNC: 101 MMOL/L (ref 98–107)
CLARITY UR: CLEAR
CLUE CELLS SPEC QL WET PREP: ABNORMAL
CO2 SERPL-SCNC: 26 MMOL/L (ref 22–29)
COLOR UR: ABNORMAL
CREAT SERPL-MCNC: 0.9 MG/DL (ref 0.57–1)
DEPRECATED RDW RBC AUTO: 41.1 FL (ref 37–54)
EOSINOPHIL # BLD AUTO: 0.1 10*3/MM3 (ref 0–0.4)
EOSINOPHIL NFR BLD AUTO: 1.3 % (ref 0.3–6.2)
ERYTHROCYTE [DISTWIDTH] IN BLOOD BY AUTOMATED COUNT: 13.2 % (ref 12.3–15.4)
GFR SERPL CREATININE-BSD FRML MDRD: 73 ML/MIN/1.73
GLOBULIN UR ELPH-MCNC: 2.4 GM/DL
GLUCOSE SERPL-MCNC: 104 MG/DL (ref 65–99)
GLUCOSE UR STRIP-MCNC: NEGATIVE MG/DL
HCT VFR BLD AUTO: 43.2 % (ref 34–46.6)
HGB BLD-MCNC: 14.7 G/DL (ref 12–15.9)
HGB UR QL STRIP.AUTO: NEGATIVE
HIV1+2 AB SER QL: NORMAL
HYALINE CASTS UR QL AUTO: ABNORMAL /LPF
HYDATID CYST SPEC WET PREP: ABNORMAL
KETONES UR QL STRIP: ABNORMAL
LEUKOCYTE ESTERASE UR QL STRIP.AUTO: NEGATIVE
LIPASE SERPL-CCNC: 17 U/L (ref 13–60)
LYMPHOCYTES # BLD AUTO: 1.8 10*3/MM3 (ref 0.7–3.1)
LYMPHOCYTES NFR BLD AUTO: 27.5 % (ref 19.6–45.3)
MCH RBC QN AUTO: 30.4 PG (ref 26.6–33)
MCHC RBC AUTO-ENTMCNC: 34.1 G/DL (ref 31.5–35.7)
MCV RBC AUTO: 88.9 FL (ref 79–97)
MONOCYTES # BLD AUTO: 0.5 10*3/MM3 (ref 0.1–0.9)
MONOCYTES NFR BLD AUTO: 7.3 % (ref 5–12)
NEUTROPHILS NFR BLD AUTO: 4.2 10*3/MM3 (ref 1.7–7)
NEUTROPHILS NFR BLD AUTO: 62.7 % (ref 42.7–76)
NITRITE UR QL STRIP: NEGATIVE
NRBC BLD AUTO-RTO: 0.2 /100 WBC (ref 0–0.2)
PH UR STRIP.AUTO: 6 [PH] (ref 5–8)
PLATELET # BLD AUTO: 272 10*3/MM3 (ref 140–450)
PMV BLD AUTO: 9.1 FL (ref 6–12)
POTASSIUM SERPL-SCNC: 3.7 MMOL/L (ref 3.5–5.2)
PROT SERPL-MCNC: 7.1 G/DL (ref 6–8.5)
PROT UR QL STRIP: ABNORMAL
RBC # BLD AUTO: 4.86 10*6/MM3 (ref 3.77–5.28)
RBC # UR: ABNORMAL /HPF
REF LAB TEST METHOD: ABNORMAL
SODIUM SERPL-SCNC: 137 MMOL/L (ref 136–145)
SP GR UR STRIP: >=1.03 (ref 1–1.03)
SQUAMOUS #/AREA URNS HPF: ABNORMAL /HPF
T VAGINALIS SPEC QL WET PREP: ABNORMAL
UROBILINOGEN UR QL STRIP: ABNORMAL
WBC # BLD AUTO: 6.7 10*3/MM3 (ref 3.4–10.8)
WBC SPEC QL WET PREP: ABNORMAL
WBC UR QL AUTO: ABNORMAL /HPF
YEAST GENITAL QL WET PREP: ABNORMAL

## 2020-12-25 PROCEDURE — 83690 ASSAY OF LIPASE: CPT | Performed by: EMERGENCY MEDICINE

## 2020-12-25 PROCEDURE — 96372 THER/PROPH/DIAG INJ SC/IM: CPT

## 2020-12-25 PROCEDURE — 81001 URINALYSIS AUTO W/SCOPE: CPT | Performed by: EMERGENCY MEDICINE

## 2020-12-25 PROCEDURE — 86592 SYPHILIS TEST NON-TREP QUAL: CPT | Performed by: EMERGENCY MEDICINE

## 2020-12-25 PROCEDURE — G0432 EIA HIV-1/HIV-2 SCREEN: HCPCS | Performed by: EMERGENCY MEDICINE

## 2020-12-25 PROCEDURE — 85025 COMPLETE CBC W/AUTO DIFF WBC: CPT | Performed by: EMERGENCY MEDICINE

## 2020-12-25 PROCEDURE — 87210 SMEAR WET MOUNT SALINE/INK: CPT | Performed by: EMERGENCY MEDICINE

## 2020-12-25 PROCEDURE — 81025 URINE PREGNANCY TEST: CPT | Performed by: EMERGENCY MEDICINE

## 2020-12-25 PROCEDURE — 87491 CHLMYD TRACH DNA AMP PROBE: CPT | Performed by: EMERGENCY MEDICINE

## 2020-12-25 PROCEDURE — 25010000002 CEFTRIAXONE PER 250 MG: Performed by: EMERGENCY MEDICINE

## 2020-12-25 PROCEDURE — 99283 EMERGENCY DEPT VISIT LOW MDM: CPT

## 2020-12-25 PROCEDURE — 87591 N.GONORRHOEAE DNA AMP PROB: CPT | Performed by: EMERGENCY MEDICINE

## 2020-12-25 PROCEDURE — 80053 COMPREHEN METABOLIC PANEL: CPT | Performed by: EMERGENCY MEDICINE

## 2020-12-25 RX ORDER — METRONIDAZOLE 500 MG/1
500 TABLET ORAL 2 TIMES DAILY
Qty: 14 TABLET | Refills: 0 | Status: SHIPPED | OUTPATIENT
Start: 2020-12-25 | End: 2021-01-20

## 2020-12-25 RX ORDER — DOXYCYCLINE HYCLATE 100 MG/1
100 CAPSULE ORAL 2 TIMES DAILY
Qty: 14 CAPSULE | Refills: 0 | Status: SHIPPED | OUTPATIENT
Start: 2020-12-25 | End: 2021-01-20

## 2020-12-25 RX ADMIN — LIDOCAINE HYDROCHLORIDE 500 MG: 10 INJECTION, SOLUTION EPIDURAL; INFILTRATION; INTRACAUDAL; PERINEURAL at 12:57

## 2020-12-25 NOTE — ED NOTES
Pt reports she had unprotected sex 2 days ago and now she is having burning in and around her vaginal area.     Luis Felipe Giles, LPN  12/25/20 [id number]

## 2020-12-25 NOTE — ED PROVIDER NOTES
Subjective   Chief complaint I think I have a sexually transmitted disease pelvic pain    History of present illness 30-year-old female who states she had unprotected intercourse a few days ago now she has pelvic pain and concerned she has an STD.  The pain is mild.  But she wants to be treated she denies any fever chills sweats no vomiting she is had normal appetite and normal bowel movements nothing makes this better or worse mild ongoing for 2 days.  No discharge or abnormal bleeding.  She does get Depo shot dysuria frequency no genital sores or lesions.  Patient reports she has been treated for syphilis in the past          Review of Systems   Constitutional: Negative for chills and fever.   HENT: Negative for congestion and sinus pressure.    Respiratory: Negative for chest tightness and shortness of breath.    Cardiovascular: Negative for chest pain and leg swelling.   Gastrointestinal: Positive for abdominal pain. Negative for vomiting.   Genitourinary: Positive for pelvic pain. Negative for difficulty urinating and dysuria.   Musculoskeletal: Negative for arthralgias and back pain.   Skin: Negative for color change and pallor.   Neurological: Negative for dizziness and light-headedness.   Psychiatric/Behavioral: Negative for agitation and behavioral problems.       Past Medical History:   Diagnosis Date   • Abdominal pain, epigastric 06/20/2017   • Anxiety disorder 11/07/2018   • Borderline personality disorder (CMS/HCC)    • Chronic pain syndrome 09/11/2017   • Contraceptive management 01/23/2018   • Depression     under care of psych   • Dysuria 11/30/2017   • Genital herpes 11/20/2017   • High risk sexual behavior 11/20/2017   • HPV (human papilloma virus) anogenital infection    • Irregular menstruation 11/30/2017    refer to OB/GYN for further management 11-   • Panic disorder    • Poor compliance with medication 02/21/2019   • Positive skin test for tuberculosis     Tuberculosis positive   •  Psychiatric illness    • Pyelonephritis, acute 06/20/2017    Improved 06-   • Right knee pain 01/23/2018    Discussed symptomatic treatment, activity as tolerate. 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    Social History     Socioeconomic History   • Marital status: Single     Spouse name: Not on file   • Number of children: Not on file   • Years of education: Not on file   • Highest education level: Not on file   Tobacco Use   • Smoking status: Current Every Day Smoker     Types: Cigarettes   • Tobacco comment: Passive Smoke: Y   Substance and Sexual Activity   • Alcohol use: No     Frequency: Never   • Drug use: Yes     Types: Cocaine(coke)   • Sexual activity: Never     Prior to Admission medications    Medication Sig Start Date End Date Taking? Authorizing Provider   ALPRAZolam (XANAX) 0.5 MG tablet Take 1 tablet by mouth 3 (Three) Times a Day As Needed for Anxiety (take one tablet three times as needed for anxiety). 9/18/19   Batool Carter PA-C   asenapine maleate (SAPHRIS) 10 MG sublingual tablet sublingual tablet Place 1 tablet under the tongue 2 (Two) Times a Day. 8/28/19   Batool Carter PA-C   Desvenlafaxine Succinate ER (PRISTIQ) 25 MG tablet sustained-release 24 hour Take 25 mg by mouth Daily. Take one tablet daily for depression and anxiety 5/1/17   Batool Carter PA-C   dicloxacillin (DYNAPEN) 250 MG capsule Take 250 mg by mouth 4 (Four) Times a Day. 7/19/18   Provider,  MD Martha   doxycycline (VIBRAMYCIN) 100 MG capsule Take 1 capsule by mouth 2 (Two) Times a Day. 12/25/20   Clark Headley MD   hydrOXYzine pamoate (VISTARIL) 50 MG capsule 1 capsule Every 12 (Twelve) Hours. 6/5/19   Provider, MD Martha   metroNIDAZOLE (FLAGYL) 500 MG tablet Take 1 tablet by mouth 2 (Two) Times a Day. 12/25/20   Clark Headley MD   valACYclovir (VALTREX) 1000 MG tablet Take  by mouth Daily. Take one tablet by mouth daily 11/20/17   Zain Skinner MD           Objective   Physical Exam  30-year-old awake alert no acute distress HEENT extraocular muscles intact pupils equal round reactive neck supple no adenopathy no meningeal signs lungs clear no retractions heart regular without murmur abdomen soft nontender good bowel sounds no peritoneal findings pelvic exam normal external genitalia some mild bleeding but cervical os is closed no cervical motion tenderness no adnexal tenderness or masses or fullness noted.  No sores or lesions extremities no edema cords or Homans' sign evidence DVT.  Patient's awake alert follows commands no facial asymmetry normal speech no focal weakness  Procedures           ED Course      Results for orders placed or performed during the hospital encounter of 12/25/20   Wet Prep, Genital - Swab, Vagina    Specimen: Vagina; Swab   Result Value Ref Range    YEAST No yeast seen No yeast seen    HYPHAL ELEMENTS No Hyphal elements seen No Hyphal elements seen    WBC'S 1+ WBC's seen (A) No WBC's seen    Clue Cells, Wet Prep 1+ Clue cells seen (A) No Clue cells seen    Trichomonas, Wet Prep No Trichomonas seen No Trichomonas seen   Pregnancy, Urine - Urine, Clean Catch    Specimen: Urine, Clean Catch   Result Value Ref Range    HCG, Urine QL Negative Negative   Urinalysis With Culture If Indicated - Urine, Clean Catch    Specimen: Urine, Clean Catch   Result Value Ref Range    Color, UA Dark Yellow (A) Yellow, Straw    Appearance, UA Clear Clear    pH, UA 6.0 5.0 -  8.0    Specific Gravity, UA >=1.030 1.005 - 1.030    Glucose, UA Negative Negative    Ketones, UA 15 mg/dL (1+) (A) Negative    Bilirubin, UA Small (1+) (A) Negative    Blood, UA Negative Negative    Protein, UA 30 mg/dL (1+) (A) Negative    Leuk Esterase, UA Negative Negative    Nitrite, UA Negative Negative    Urobilinogen, UA 0.2 E.U./dL 0.2 - 1.0 E.U./dL   Comprehensive Metabolic Panel    Specimen: Blood   Result Value Ref Range    Glucose 104 (H) 65 - 99 mg/dL    BUN 18 6 - 20 mg/dL    Creatinine 0.90 0.57 - 1.00 mg/dL    Sodium 137 136 - 145 mmol/L    Potassium 3.7 3.5 - 5.2 mmol/L    Chloride 101 98 - 107 mmol/L    CO2 26.0 22.0 - 29.0 mmol/L    Calcium 9.3 8.6 - 10.5 mg/dL    Total Protein 7.1 6.0 - 8.5 g/dL    Albumin 4.70 3.50 - 5.20 g/dL    ALT (SGPT) 11 1 - 33 U/L    AST (SGOT) 16 1 - 32 U/L    Alkaline Phosphatase 83 39 - 117 U/L    Total Bilirubin 1.0 0.0 - 1.2 mg/dL    eGFR Non African Amer 73 >60 mL/min/1.73    Globulin 2.4 gm/dL    A/G Ratio 2.0 g/dL    BUN/Creatinine Ratio 20.0 7.0 - 25.0    Anion Gap 10.0 5.0 - 15.0 mmol/L   Lipase    Specimen: Blood   Result Value Ref Range    Lipase 17 13 - 60 U/L   HIV-1 / O / 2 Ag / Antibody 4th Generation    Specimen: Blood   Result Value Ref Range    HIV-1/ HIV-2 Non-Reactive Non-Reactive   CBC Auto Differential    Specimen: Blood   Result Value Ref Range    WBC 6.70 3.40 - 10.80 10*3/mm3    RBC 4.86 3.77 - 5.28 10*6/mm3    Hemoglobin 14.7 12.0 - 15.9 g/dL    Hematocrit 43.2 34.0 - 46.6 %    MCV 88.9 79.0 - 97.0 fL    MCH 30.4 26.6 - 33.0 pg    MCHC 34.1 31.5 - 35.7 g/dL    RDW 13.2 12.3 - 15.4 %    RDW-SD 41.1 37.0 - 54.0 fl    MPV 9.1 6.0 - 12.0 fL    Platelets 272 140 - 450 10*3/mm3    Neutrophil % 62.7 42.7 - 76.0 %    Lymphocyte % 27.5 19.6 - 45.3 %    Monocyte % 7.3 5.0 - 12.0 %    Eosinophil % 1.3 0.3 - 6.2 %    Basophil % 1.2 0.0 - 1.5 %    Neutrophils, Absolute 4.20 1.70 - 7.00 10*3/mm3    Lymphocytes, Absolute 1.80 0.70 - 3.10 10*3/mm3     Monocytes, Absolute 0.50 0.10 - 0.90 10*3/mm3    Eosinophils, Absolute 0.10 0.00 - 0.40 10*3/mm3    Basophils, Absolute 0.10 0.00 - 0.20 10*3/mm3    nRBC 0.2 0.0 - 0.2 /100 WBC   Urinalysis, Microscopic Only - Urine, Clean Catch    Specimen: Urine, Clean Catch   Result Value Ref Range    RBC, UA 0-2 (A) None Seen /HPF    WBC, UA 3-5 (A) None Seen /HPF    Bacteria, UA None Seen None Seen /HPF    Squamous Epithelial Cells, UA 0-2 None Seen, 0-2 /HPF    Hyaline Casts, UA None Seen None Seen /LPF    Methodology Manual Light Microscopy      No radiology results for the last day  Medications   cefTRIAXone (ROCEPHIN) 350 mg/ml in lidocaine 1% IM syringe (1 gm vial) (500 mg Intramuscular Given 12/25/20 1257)                                            MDM  Number of Diagnoses or Management Options  Pelvic pain:   Diagnosis management comments: Medical decision making.  Patient had the above exam and evaluations.  CBC unremarkable urine unremarkable she had clue cells gonorrhea chlamydia is pending HIV negative RPR pending she had requested treatment she was given Rocephin 500 mg IM her pregnancy test was negative trichomonas was negative.  Patient repeat exam is resting comfortably abdomen soft nontender nondistended good bowel sounds no peritoneal findings or masses no evidence of acute appendicitis on exam no evidence of acute intra-abdominal process.  She was requested for STD.  Which was done she will be placed on doxycycline and Flagyl and she was counseled about sexually transmitted diseases and appropriate contraceptive and potential risk of other issues that could develop.  We talked about what to return for.  We talked about having no alcohol with her medications and for 2 days after finishing.  And to take the medications.  She will follow-up next week and she will return for any other worsening problems       Amount and/or Complexity of Data Reviewed  Clinical lab tests: reviewed        Final diagnoses:   Pelvic  Clark Samson MD  12/25/20 1400

## 2020-12-25 NOTE — DISCHARGE INSTRUCTIONS
No alcohol while on medications in 2 days after finishing.  Use appropriate protection with condoms.  Follow-up primary care doctor on Monday and follow-up results of cultures and blood test.  Return for fever increasing pain discharge abnormal bleeding vomiting or any other new or worsening problems or concerns  Doxycycline and Flagyl.  He was given Rocephin here

## 2020-12-27 LAB — RPR SER QL: NORMAL

## 2020-12-29 LAB
C TRACH RRNA SPEC QL NAA+PROBE: NEGATIVE
N GONORRHOEA RRNA SPEC QL NAA+PROBE: NEGATIVE

## 2021-01-20 ENCOUNTER — OFFICE VISIT (OUTPATIENT)
Dept: PSYCHIATRY | Facility: CLINIC | Age: 31
End: 2021-01-20

## 2021-01-20 DIAGNOSIS — F41.1 GENERALIZED ANXIETY DISORDER: Chronic | ICD-10-CM

## 2021-01-20 DIAGNOSIS — F63.3 TRICHOTILLOMANIA: ICD-10-CM

## 2021-01-20 DIAGNOSIS — F20.0 SCHIZOPHRENIA, PARANOID (HCC): Primary | Chronic | ICD-10-CM

## 2021-01-20 PROCEDURE — 99214 OFFICE O/P EST MOD 30 MIN: CPT | Performed by: PHYSICIAN ASSISTANT

## 2021-01-20 RX ORDER — MEDROXYPROGESTERONE ACETATE 150 MG/ML
INJECTION, SUSPENSION INTRAMUSCULAR
COMMUNITY
Start: 2020-11-30 | End: 2022-08-23

## 2021-01-20 RX ORDER — MELATONIN 10 MG
TABLET, SUBLINGUAL SUBLINGUAL
COMMUNITY
Start: 2021-01-04

## 2021-01-20 RX ORDER — ARIPIPRAZOLE 15 MG/1
TABLET ORAL
COMMUNITY
Start: 2021-01-12 | End: 2021-03-02

## 2021-01-20 RX ORDER — BUSPIRONE HYDROCHLORIDE 15 MG/1
TABLET ORAL
COMMUNITY
Start: 2021-01-12 | End: 2021-06-01

## 2021-01-20 RX ORDER — ARIPIPRAZOLE 400 MG
400 KIT INTRAMUSCULAR
Qty: 1 EACH | Refills: 2 | Status: SHIPPED | OUTPATIENT
Start: 2021-01-20 | End: 2021-05-17

## 2021-01-20 RX ORDER — ARIPIPRAZOLE 400 MG
KIT INTRAMUSCULAR
COMMUNITY
Start: 2021-01-11 | End: 2021-01-20 | Stop reason: SDUPTHER

## 2021-01-20 NOTE — PROGRESS NOTES
Subjective   Annmarie Perera is a 30 y.o.white female who presents today for follow up in the office    Chief Complaint:  Anxiety, hearing voices    History of Present Illness:   Last visit here was Sept 2019  She was put in [facility] in Sept 2019 and then transferred to [facility] for 11 months, discharged in Sept 2020 to a Group home in [place] x 3 wks but left and went back home with her Dad  She was on Zyprexa while in the hospital but Salazar (Junior Kasper NP via phone visit) took her off the Zyprexa and put her on Abilify Maintenna (next due on jan 27th) plus the tabs and weaning her off the Abilify, has a phone appt on 2/9 and wants to tell him she is returning to [facility]  Now on Buspar was recently increased to 15mg   Was on oral Abilify 30mg daily with the shot but just decreased to Abilify 15mg daily about a week ago  Now on Depo Provera, Vitamin D3 and Calcium, next due March 3  Dimitris (son) in foster care and cannot get him back but her daughter (lyudmila) Dad got custody and he lets her have the baby while he works (2yrs old now)  She was treated for Syphilis before she was admitted, got 3 antibiotic shots  She admits that sometimes she still gets paranoid and sometimes hears voices but so much better  Depression 4/10  Denies SI/HI  Anxiety 7/10, says Buspar does not help      The following portions of the patient's history were reviewed and updated as appropriate: allergies, current medications, past family history, past medical history, past social history, past surgical history and problem list.    PAST PSYCHIATRIC HISTORY  Axis I  Affective/Bipoloar Disorder, Anxiety/Panic Disorder, Cognitive Disorder  Axis II  None,     PAST OUTPATIENT TREATMENT  Diagnosis treated:  Affective Disorder, Anxiety/Panic Disorder, Cognitive Disorder  Treatment Type:  Individual Therapy, Group Therapy, Medication Management  Prior Psychiatric  Medications:  Pristiq  Saphris  lamictal    lexapro  risperidone - she did not like how she was feeling so she stopped all of them  Neurontin   prozac, zoloft, lexapro did not like , no emotions  abilify - not sure  clonazepam - effective   vraylar - increased agitation   Palperidone caused hands and arms to draw up  Zyprexa, hyperprolactinemia  Abilify Maintenna  Support Groups:  Narcotics Anonymous (NA)  Sequelae Of Mental Disorder:  suicide attempt, arrest, social isolation, family disruption, financial hardships, emotional distress      Interval History  Improved    Side Effects  None    Past Psych Hx was reviewed and compared to 9/18/19 visit and appropriate updates were made.    Past Medical History:  Past Medical History:   Diagnosis Date   • Abdominal pain, epigastric 06/20/2017   • Anxiety disorder 11/07/2018   • Borderline personality disorder (CMS/HCC)    • Chronic pain syndrome 09/11/2017   • Contraceptive management 01/23/2018   • Depression     under care of psych   • Dysuria 11/30/2017   • Genital herpes 11/20/2017   • High risk sexual behavior 11/20/2017   • HPV (human papilloma virus) anogenital infection    • Irregular menstruation 11/30/2017    refer to OB/GYN for further management 11-   • Panic disorder    • Poor compliance with medication 02/21/2019   • Positive skin test for tuberculosis     Tuberculosis positive   • Psychiatric illness    • Pyelonephritis, acute 06/20/2017    Improved 06-   • Right knee pain 01/23/2018    Discussed symptomatic treatment, activity as tolerate. 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   • Vaginitis 11/20/2017   • Withdrawal symptoms, drug or narcotic (CMS/[facility])        Social History:  Social History     Socioeconomic History   • Marital status: Single     Spouse name: Not on file   • Number of  children: Not on file   • Years of education: Not on file   • Highest education level: Not on file   Tobacco Use   • Smoking status: Current Every Day Smoker     Types: Cigarettes   • Smokeless tobacco: Never Used   • Tobacco comment: Passive Smoke: Y   Substance and Sexual Activity   • Alcohol use: No     Frequency: Never   • Drug use: Not Currently     Types: Cocaine(coke), Benzodiazepines, Oxycodone, Hydrocodone, Marijuana   • Sexual activity: Never       Family History:  History reviewed. No pertinent family history.    Past Surgical History:  Past Surgical History:   Procedure Laterality Date   • PAP SMEAR  2017    [facility]       Problem List:  Patient Active Problem List   Diagnosis   • Abdominal pain, epigastric   • Generalized anxiety disorder   • Chronic pain disorder   • Contraceptive management   • Difficult or painful urination   • Genital herpes   • High risk sexual behavior   • Irregular menstruation   • Knee pain, right   • Poor compliance with medication   • Pyelonephritis, acute   • Vaginitis   • Schizophrenia, paranoid (CMS/HCC)   • Trichotillomania   • Tobacco use   • Acute opioid withdrawal (CMS/HCC)   • Paranoid schizophrenia (CMS/HCC)       Allergy:   Allergies   Allergen Reactions   • Haldol [Haloperidol] Other (See Comments) and Mental Status Change     Abstraction from [facility]         Discontinued Medications:  Medications Discontinued During This Encounter   Medication Reason   • ALPRAZolam (XANAX) 0.5 MG tablet *Therapy completed   • asenapine maleate (SAPHRIS) 10 MG sublingual tablet sublingual tablet *Therapy completed   • Desvenlafaxine Succinate ER (PRISTIQ) 25 MG tablet sustained-release 24 hour *Therapy completed   • valACYclovir (VALTREX) 1000 MG tablet *Therapy completed   • metroNIDAZOLE (FLAGYL) 500 MG tablet *Therapy completed   • dicloxacillin (DYNAPEN) 250 MG capsule *Therapy completed   • doxycycline (VIBRAMYCIN) 100 MG capsule *Therapy  completed   • Abilify Maintena 400 MG Suspension Reconstituted ER IM injection ER Reorder   • hydrOXYzine pamoate (VISTARIL) 50 MG capsule *Therapy completed       Current Medications:   Current Outpatient Medications   Medication Sig Dispense Refill   • Abilify Maintena 400 MG Suspension Reconstituted ER IM injection ER Inject 400 mg into the appropriate muscle as directed by prescriber Every 28 (Twenty-Eight) Days. 1 each 2   • ARIPiprazole (ABILIFY) 15 MG tablet      • busPIRone (BUSPAR) 15 MG tablet      • Calcium Carbonate-Vitamin D (calcium-vitamin D) 500-200 MG-UNIT tablet per tablet      • Cholecalciferol (Vitamin D3) 250 MCG (91961 UT) tablet      • medroxyPROGESTERone (DEPO-PROVERA) 150 MG/ML injection        No current facility-administered medications for this visit.          Review of Symptoms:    Psychiatric/Behavioral: Negative for agitation, behavioral problems, confusion, decreased concentration, dysphoric mood, hallucinations, self-injury, sleep disturbance and suicidal ideas. The patient is nervous/anxious and is not hyperactive.        Physical Exam:   not currently breastfeeding.    Mental Status Exam:   Hygiene:   fair  Cooperation:  Cooperative  Eye Contact:  Good  Psychomotor Behavior:  Appropriate  Affect:  Blunted  Mood: Normal  Hopelessness: Denies  Speech:  Normal rate and volume  Thought Process:  Goal directed  Thought Content:  Normal  Suicidal:  None  Homicidal:  None  Hallucinations:  Auditory (occasional now)  Delusion:  None currently  Memory:  Deficits  Orientation:  Person, Place, Time and Situation  Reliability:  Fair  Insight:  Fair  Judgement: Fair    Impulse Control:  Fair  Physical/Medical Issues:  No      Mental Status Exam was reviewed and compared to 9/18/19 visit and updates were made.     PHQ-9 Depression Screening  Little interest or pleasure in doing things? 1   Feeling down, depressed, or hopeless? 1   Trouble falling or staying asleep, or sleeping too much? 1    Feeling tired or having little energy? 1   Poor appetite or overeating? 1   Feeling bad about yourself - or that you are a failure or have let yourself or your family down? 1   Trouble concentrating on things, such as reading the newspaper or watching television? 1   Moving or speaking so slowly that other people could have noticed? Or the opposite - being so fidgety or restless that you have been moving around a lot more than usual? 0   Thoughts that you would be better off dead, or of hurting yourself in some way? 0   PHQ-9 Total Score 7   If you checked off any problems, how difficult have these problems made it for you to do your work, take care of things at home, or get along with other people? Somewhat difficult           Current every day smoker less than 3 minutes spent counseling Not agreeable to stopping    I advised Annmarie of the risks of tobacco use.     Lab Results:   Admission on 12/25/2020, Discharged on 12/25/2020   Component Date Value Ref Range Status   • HCG, Urine QL 12/25/2020 Negative  Negative Final   • Color, UA 12/25/2020 Dark Yellow* Yellow, Straw Final   • Appearance, UA 12/25/2020 Clear  Clear Final   • pH, UA 12/25/2020 6.0  5.0 - 8.0 Final   • Specific Gravity, UA 12/25/2020 >=1.030  1.005 - 1.030 Final   • Glucose, UA 12/25/2020 Negative  Negative Final   • Ketones, UA 12/25/2020 15 mg/dL (1+)* Negative Final   • Bilirubin, UA 12/25/2020 Small (1+)* Negative Final    Confirmation testing is unavailable.  A serum bilirubin is recommended for further assessment.   • Blood, UA 12/25/2020 Negative  Negative Final   • Protein, UA 12/25/2020 30 mg/dL (1+)* Negative Final   • Leuk Esterase, UA 12/25/2020 Negative  Negative Final   • Nitrite, UA 12/25/2020 Negative  Negative Final   • Urobilinogen, UA 12/25/2020 0.2 E.U./dL  0.2 - 1.0 E.U./dL Final   • YEAST 12/25/2020 No yeast seen  No yeast seen Final   • HYPHAL ELEMENTS 12/25/2020 No Hyphal elements seen  No Hyphal elements seen Final    • WBC'S 12/25/2020 1+ WBC's seen* No WBC's seen Final   • Clue Cells, Wet Prep 12/25/2020 1+ Clue cells seen* No Clue cells seen Final   • Trichomonas, Wet Prep 12/25/2020 No Trichomonas seen  No Trichomonas seen Final   • RPR 12/25/2020 Non-Reactive  Non-Reactive Final   • Glucose 12/25/2020 104* 65 - 99 mg/dL Final   • BUN 12/25/2020 18  6 - 20 mg/dL Final   • Creatinine 12/25/2020 0.90  0.57 - 1.00 mg/dL Final   • Sodium 12/25/2020 137  136 - 145 mmol/L Final   • Potassium 12/25/2020 3.7  3.5 - 5.2 mmol/L Final   • Chloride 12/25/2020 101  98 - 107 mmol/L Final   • CO2 12/25/2020 26.0  22.0 - 29.0 mmol/L Final   • Calcium 12/25/2020 9.3  8.6 - 10.5 mg/dL Final   • Total Protein 12/25/2020 7.1  6.0 - 8.5 g/dL Final   • Albumin 12/25/2020 4.70  3.50 - 5.20 g/dL Final   • ALT (SGPT) 12/25/2020 11  1 - 33 U/L Final   • AST (SGOT) 12/25/2020 16  1 - 32 U/L Final   • Alkaline Phosphatase 12/25/2020 83  39 - 117 U/L Final   • Total Bilirubin 12/25/2020 1.0  0.0 - 1.2 mg/dL Final   • eGFR Non African Amer 12/25/2020 73  >60 mL/min/1.73 Final   • Globulin 12/25/2020 2.4  gm/dL Final   • A/G Ratio 12/25/2020 2.0  g/dL Final   • BUN/Creatinine Ratio 12/25/2020 20.0  7.0 - 25.0 Final   • Anion Gap 12/25/2020 10.0  5.0 - 15.0 mmol/L Final   • Lipase 12/25/2020 17  13 - 60 U/L Final   • HIV-1/ HIV-2 12/25/2020 Non-Reactive  Non-Reactive Final    A non-reactive test result does not preclude the possibility of exposure to HIV or infection with HIV. An antibody response to recent exposure may take several months to reach detectable levels.   • WBC 12/25/2020 6.70  3.40 - 10.80 10*3/mm3 Final   • RBC 12/25/2020 4.86  3.77 - 5.28 10*6/mm3 Final   • Hemoglobin 12/25/2020 14.7  12.0 - 15.9 g/dL Final   • Hematocrit 12/25/2020 43.2  34.0 - 46.6 % Final   • MCV 12/25/2020 88.9  79.0 - 97.0 fL Final   • MCH 12/25/2020 30.4  26.6 - 33.0 pg Final   • MCHC 12/25/2020 34.1  31.5 - 35.7 g/dL Final   • RDW 12/25/2020 13.2  12.3 - 15.4 %  Final   • RDW-SD 12/25/2020 41.1  37.0 - 54.0 fl Final   • MPV 12/25/2020 9.1  6.0 - 12.0 fL Final   • Platelets 12/25/2020 272  140 - 450 10*3/mm3 Final   • Neutrophil % 12/25/2020 62.7  42.7 - 76.0 % Final   • Lymphocyte % 12/25/2020 27.5  19.6 - 45.3 % Final   • Monocyte % 12/25/2020 7.3  5.0 - 12.0 % Final   • Eosinophil % 12/25/2020 1.3  0.3 - 6.2 % Final   • Basophil % 12/25/2020 1.2  0.0 - 1.5 % Final   • Neutrophils, Absolute 12/25/2020 4.20  1.70 - 7.00 10*3/mm3 Final   • Lymphocytes, Absolute 12/25/2020 1.80  0.70 - 3.10 10*3/mm3 Final   • Monocytes, Absolute 12/25/2020 0.50  0.10 - 0.90 10*3/mm3 Final   • Eosinophils, Absolute 12/25/2020 0.10  0.00 - 0.40 10*3/mm3 Final   • Basophils, Absolute 12/25/2020 0.10  0.00 - 0.20 10*3/mm3 Final   • nRBC 12/25/2020 0.2  0.0 - 0.2 /100 WBC Final   • RBC, UA 12/25/2020 0-2* None Seen /HPF Final   • WBC, UA 12/25/2020 3-5* None Seen /HPF Final   • Bacteria, UA 12/25/2020 None Seen  None Seen /HPF Final   • Squamous Epithelial Cells, UA 12/25/2020 0-2  None Seen, 0-2 /HPF Final   • Hyaline Casts, UA 12/25/2020 None Seen  None Seen /LPF Final   • Methodology 12/25/2020 Manual Light Microscopy   Final   • Chlamydia trachomatis, ELLA 12/25/2020 Negative  Negative Final   • Neisseria gonorrhoeae, ELLA 12/25/2020 Negative  Negative Final       Assessment/Plan   Problems Addressed this Visit        Other    Generalized anxiety disorder (Chronic)    Relevant Medications    ARIPiprazole (ABILIFY) 15 MG tablet    busPIRone (BUSPAR) 15 MG tablet    Abilify Maintena 400 MG Suspension Reconstituted ER IM injection ER    Schizophrenia, paranoid (CMS/HCC) - Primary    Relevant Medications    ARIPiprazole (ABILIFY) 15 MG tablet    busPIRone (BUSPAR) 15 MG tablet    Abilify Maintena 400 MG Suspension Reconstituted ER IM injection ER    Trichotillomania    Relevant Medications    ARIPiprazole (ABILIFY) 15 MG tablet    busPIRone (BUSPAR) 15 MG tablet    Abilify Maintena 400 MG Suspension  Reconstituted ER IM injection ER      Diagnoses       Codes Comments    Schizophrenia, paranoid (CMS/HCC)    -  Primary ICD-10-CM: F20.0  ICD-9-CM: 295.30     Generalized anxiety disorder     ICD-10-CM: F41.1  ICD-9-CM: 300.02     Trichotillomania     ICD-10-CM: F63.3  ICD-9-CM: 312.39           Visit Diagnoses:    ICD-10-CM ICD-9-CM   1. Schizophrenia, paranoid (CMS/HCC)  F20.0 295.30   2. Generalized anxiety disorder  F41.1 300.02   3. Trichotillomania  F63.3 312.39       TREATMENT PLAN/GOALS: Continue supportive psychotherapy efforts and medications as indicated. Treatment and medication options discussed during today's visit. Patient ackowledged and verbally consented to continue with current treatment plan and was educated on the importance of compliance with treatment and follow-up appointments.    MEDICATION ISSUES:  INSPECT reviewed as expected  Discussed medication options and treatment plan of prescribed medication as well as the risks, benefits, and side effects including potential falls, possible impaired driving and metabolic adversities among others. Patient is agreeable to call the office with any worsening of symptoms or onset of side effects. Patient is agreeable to call 911 or go to the nearest ER should he/she begin having SI/HI. No medication side effects or related complaints today.     Patient has not been seen in over a year b/c at [facility] for almost a year, has been going to [facility] since her discharge.    She was on Zyprexa while inpatient, but [facility] changed her to Abilify Maintena, as well as, oral Abilify due to breast lactation with the Zyprexa.  Refill Abilify Maintena 400 mg injection every 28 days.  She was just decreased from 30 mg of Abilify to 15 mg of Abilify daily, continue until her next follow-up in 4 weeks and can decrease to 10 mg, with a goal of using only the Abilify Maintena.  Patient is asking to have Xanax restarted, does not think the BuSpar  helps.  Due to her history of substance abuse and illicit drug screens, will not prescribe her narcotics.  Continue Buspar 15mg TID for anxiety  Patient signed a release of records for both [facility] and [facility] to get copies of records.    MEDS ORDERED DURING VISIT:  New Medications Ordered This Visit   Medications   • Abilify Maintena 400 MG Suspension Reconstituted ER IM injection ER     Sig: Inject 400 mg into the appropriate muscle as directed by prescriber Every 28 (Twenty-Eight) Days.     Dispense:  1 each     Refill:  2       Return in about 4 weeks (around 2/17/2021).         This document has been electronically signed by Batool Carter PA-C  January 20, 2021 14:39 EST

## 2021-01-20 NOTE — PATIENT INSTRUCTIONS

## 2021-02-06 ENCOUNTER — HOSPITAL ENCOUNTER (EMERGENCY)
Facility: HOSPITAL | Age: 31
Discharge: HOME | End: 2021-02-06
Attending: EMERGENCY MEDICINE | Admitting: EMERGENCY MEDICINE

## 2021-02-06 ENCOUNTER — APPOINTMENT (OUTPATIENT)
Dept: CT IMAGING | Facility: HOSPITAL | Age: 31
End: 2021-02-06

## 2021-02-06 VITALS
OXYGEN SATURATION: 99 % | RESPIRATION RATE: 18 BRPM | BODY MASS INDEX: 27.65 KG/M2 | DIASTOLIC BLOOD PRESSURE: 72 MMHG | HEIGHT: 67 IN | HEART RATE: 88 BPM | TEMPERATURE: 98 F | SYSTOLIC BLOOD PRESSURE: 109 MMHG | WEIGHT: 176.15 LBS

## 2021-02-06 DIAGNOSIS — N39.0 URINARY TRACT INFECTION WITH HEMATURIA, SITE UNSPECIFIED: Primary | ICD-10-CM

## 2021-02-06 DIAGNOSIS — R10.9 FLANK PAIN: ICD-10-CM

## 2021-02-06 DIAGNOSIS — R31.9 URINARY TRACT INFECTION WITH HEMATURIA, SITE UNSPECIFIED: Primary | ICD-10-CM

## 2021-02-06 DIAGNOSIS — R10.2 PELVIC PAIN: ICD-10-CM

## 2021-02-06 LAB
ALBUMIN SERPL-MCNC: 4.6 G/DL (ref 3.5–5.2)
ALBUMIN/GLOB SERPL: 1.4 G/DL
ALP SERPL-CCNC: 71 U/L (ref 39–117)
ALT SERPL W P-5'-P-CCNC: 6 U/L (ref 1–33)
AMORPH URATE CRY URNS QL MICRO: ABNORMAL /HPF
ANION GAP SERPL CALCULATED.3IONS-SCNC: 10 MMOL/L (ref 5–15)
APTT PPP: 28.4 SECONDS (ref 24–31)
AST SERPL-CCNC: 14 U/L (ref 1–32)
B PARAPERT DNA SPEC QL NAA+PROBE: NOT DETECTED
B PERT DNA SPEC QL NAA+PROBE: NOT DETECTED
B-HCG UR QL: NEGATIVE
BACTERIA UR QL AUTO: ABNORMAL /HPF
BASOPHILS # BLD AUTO: 0.1 10*3/MM3 (ref 0–0.2)
BASOPHILS NFR BLD AUTO: 0.5 % (ref 0–1.5)
BILIRUB SERPL-MCNC: 0.8 MG/DL (ref 0–1.2)
BILIRUB UR QL STRIP: NEGATIVE
BUN SERPL-MCNC: 10 MG/DL (ref 6–20)
BUN/CREAT SERPL: 12.5 (ref 7–25)
C PNEUM DNA NPH QL NAA+NON-PROBE: NOT DETECTED
CALCIUM SPEC-SCNC: 9.3 MG/DL (ref 8.6–10.5)
CHLORIDE SERPL-SCNC: 104 MMOL/L (ref 98–107)
CLARITY UR: ABNORMAL
CLUE CELLS SPEC QL WET PREP: ABNORMAL
CO2 SERPL-SCNC: 27 MMOL/L (ref 22–29)
COLOR UR: ABNORMAL
CREAT SERPL-MCNC: 0.8 MG/DL (ref 0.57–1)
D-LACTATE SERPL-SCNC: 0.6 MMOL/L (ref 0.5–2)
DEPRECATED RDW RBC AUTO: 43.8 FL (ref 37–54)
EOSINOPHIL # BLD AUTO: 0 10*3/MM3 (ref 0–0.4)
EOSINOPHIL NFR BLD AUTO: 0.1 % (ref 0.3–6.2)
ERYTHROCYTE [DISTWIDTH] IN BLOOD BY AUTOMATED COUNT: 13.8 % (ref 12.3–15.4)
FLUAV SUBTYP SPEC NAA+PROBE: NOT DETECTED
FLUBV RNA ISLT QL NAA+PROBE: NOT DETECTED
GFR SERPL CREATININE-BSD FRML MDRD: 83 ML/MIN/1.73
GLOBULIN UR ELPH-MCNC: 3.2 GM/DL
GLUCOSE SERPL-MCNC: 103 MG/DL (ref 65–99)
GLUCOSE UR STRIP-MCNC: NEGATIVE MG/DL
HADV DNA SPEC NAA+PROBE: NOT DETECTED
HCOV 229E RNA SPEC QL NAA+PROBE: NOT DETECTED
HCOV HKU1 RNA SPEC QL NAA+PROBE: NOT DETECTED
HCOV NL63 RNA SPEC QL NAA+PROBE: NOT DETECTED
HCOV OC43 RNA SPEC QL NAA+PROBE: NOT DETECTED
HCT VFR BLD AUTO: 40.2 % (ref 34–46.6)
HGB BLD-MCNC: 13.4 G/DL (ref 12–15.9)
HGB UR QL STRIP.AUTO: ABNORMAL
HMPV RNA NPH QL NAA+NON-PROBE: NOT DETECTED
HPIV1 RNA SPEC QL NAA+PROBE: NOT DETECTED
HPIV2 RNA SPEC QL NAA+PROBE: NOT DETECTED
HPIV3 RNA NPH QL NAA+PROBE: NOT DETECTED
HPIV4 P GENE NPH QL NAA+PROBE: NOT DETECTED
HYALINE CASTS UR QL AUTO: ABNORMAL /LPF
HYDATID CYST SPEC WET PREP: ABNORMAL
INR PPP: 1.03 (ref 0.93–1.1)
KETONES UR QL STRIP: ABNORMAL
LEUKOCYTE ESTERASE UR QL STRIP.AUTO: ABNORMAL
LIPASE SERPL-CCNC: 13 U/L (ref 13–60)
LYMPHOCYTES # BLD AUTO: 1 10*3/MM3 (ref 0.7–3.1)
LYMPHOCYTES NFR BLD AUTO: 8.5 % (ref 19.6–45.3)
M PNEUMO IGG SER IA-ACNC: NOT DETECTED
MCH RBC QN AUTO: 30.2 PG (ref 26.6–33)
MCHC RBC AUTO-ENTMCNC: 33.5 G/DL (ref 31.5–35.7)
MCV RBC AUTO: 90.2 FL (ref 79–97)
MONOCYTES # BLD AUTO: 0.7 10*3/MM3 (ref 0.1–0.9)
MONOCYTES NFR BLD AUTO: 6.3 % (ref 5–12)
NEUTROPHILS NFR BLD AUTO: 84.6 % (ref 42.7–76)
NEUTROPHILS NFR BLD AUTO: 9.9 10*3/MM3 (ref 1.7–7)
NITRITE UR QL STRIP: POSITIVE
NRBC BLD AUTO-RTO: 0 /100 WBC (ref 0–0.2)
PH UR STRIP.AUTO: 6 [PH] (ref 5–8)
PLATELET # BLD AUTO: 197 10*3/MM3 (ref 140–450)
PMV BLD AUTO: 9.5 FL (ref 6–12)
POTASSIUM SERPL-SCNC: 4.5 MMOL/L (ref 3.5–5.2)
PROT SERPL-MCNC: 7.8 G/DL (ref 6–8.5)
PROT UR QL STRIP: ABNORMAL
PROTHROMBIN TIME: 11.3 SECONDS (ref 9.6–11.7)
RBC # BLD AUTO: 4.46 10*6/MM3 (ref 3.77–5.28)
RBC # UR: ABNORMAL /HPF
REF LAB TEST METHOD: ABNORMAL
RENAL EPI CELLS #/AREA URNS HPF: ABNORMAL /HPF
RHINOVIRUS RNA SPEC NAA+PROBE: NOT DETECTED
RSV RNA NPH QL NAA+NON-PROBE: NOT DETECTED
SARS-COV-2 RNA NPH QL NAA+NON-PROBE: NOT DETECTED
SODIUM SERPL-SCNC: 141 MMOL/L (ref 136–145)
SP GR UR STRIP: 1.02 (ref 1–1.03)
SQUAMOUS #/AREA URNS HPF: ABNORMAL /HPF
T VAGINALIS SPEC QL WET PREP: ABNORMAL
UROBILINOGEN UR QL STRIP: ABNORMAL
WBC # BLD AUTO: 11.8 10*3/MM3 (ref 3.4–10.8)
WBC SPEC QL WET PREP: ABNORMAL
WBC UR QL AUTO: ABNORMAL /HPF
YEAST GENITAL QL WET PREP: ABNORMAL

## 2021-02-06 PROCEDURE — 81001 URINALYSIS AUTO W/SCOPE: CPT | Performed by: EMERGENCY MEDICINE

## 2021-02-06 PROCEDURE — 85610 PROTHROMBIN TIME: CPT | Performed by: EMERGENCY MEDICINE

## 2021-02-06 PROCEDURE — 96374 THER/PROPH/DIAG INJ IV PUSH: CPT

## 2021-02-06 PROCEDURE — 87086 URINE CULTURE/COLONY COUNT: CPT | Performed by: EMERGENCY MEDICINE

## 2021-02-06 PROCEDURE — 25010000002 MORPHINE PER 10 MG: Performed by: EMERGENCY MEDICINE

## 2021-02-06 PROCEDURE — 99284 EMERGENCY DEPT VISIT MOD MDM: CPT

## 2021-02-06 PROCEDURE — 87210 SMEAR WET MOUNT SALINE/INK: CPT | Performed by: EMERGENCY MEDICINE

## 2021-02-06 PROCEDURE — 87186 SC STD MICRODIL/AGAR DIL: CPT | Performed by: EMERGENCY MEDICINE

## 2021-02-06 PROCEDURE — 87591 N.GONORRHOEAE DNA AMP PROB: CPT | Performed by: EMERGENCY MEDICINE

## 2021-02-06 PROCEDURE — 74176 CT ABD & PELVIS W/O CONTRAST: CPT

## 2021-02-06 PROCEDURE — 87077 CULTURE AEROBIC IDENTIFY: CPT | Performed by: EMERGENCY MEDICINE

## 2021-02-06 PROCEDURE — 85025 COMPLETE CBC W/AUTO DIFF WBC: CPT | Performed by: EMERGENCY MEDICINE

## 2021-02-06 PROCEDURE — 25010000002 ONDANSETRON PER 1 MG: Performed by: EMERGENCY MEDICINE

## 2021-02-06 PROCEDURE — 85730 THROMBOPLASTIN TIME PARTIAL: CPT | Performed by: EMERGENCY MEDICINE

## 2021-02-06 PROCEDURE — 80053 COMPREHEN METABOLIC PANEL: CPT | Performed by: EMERGENCY MEDICINE

## 2021-02-06 PROCEDURE — 83690 ASSAY OF LIPASE: CPT | Performed by: EMERGENCY MEDICINE

## 2021-02-06 PROCEDURE — 87491 CHLMYD TRACH DNA AMP PROBE: CPT | Performed by: EMERGENCY MEDICINE

## 2021-02-06 PROCEDURE — 96375 TX/PRO/DX INJ NEW DRUG ADDON: CPT

## 2021-02-06 PROCEDURE — 83605 ASSAY OF LACTIC ACID: CPT

## 2021-02-06 PROCEDURE — 0202U NFCT DS 22 TRGT SARS-COV-2: CPT | Performed by: EMERGENCY MEDICINE

## 2021-02-06 PROCEDURE — 25010000002 CEFTRIAXONE PER 250 MG: Performed by: EMERGENCY MEDICINE

## 2021-02-06 PROCEDURE — 81025 URINE PREGNANCY TEST: CPT | Performed by: EMERGENCY MEDICINE

## 2021-02-06 RX ORDER — MORPHINE SULFATE 4 MG/ML
4 INJECTION, SOLUTION INTRAMUSCULAR; INTRAVENOUS ONCE
Status: COMPLETED | OUTPATIENT
Start: 2021-02-06 | End: 2021-02-06

## 2021-02-06 RX ORDER — ONDANSETRON 2 MG/ML
4 INJECTION INTRAMUSCULAR; INTRAVENOUS ONCE
Status: COMPLETED | OUTPATIENT
Start: 2021-02-06 | End: 2021-02-06

## 2021-02-06 RX ORDER — ONDANSETRON 4 MG/1
4 TABLET, ORALLY DISINTEGRATING ORAL EVERY 8 HOURS PRN
Qty: 20 TABLET | Refills: 0 | Status: SHIPPED | OUTPATIENT
Start: 2021-02-06 | End: 2022-08-23

## 2021-02-06 RX ORDER — DICLOFENAC SODIUM 75 MG/1
75 TABLET, DELAYED RELEASE ORAL 2 TIMES DAILY
Qty: 20 TABLET | Refills: 0 | Status: SHIPPED | OUTPATIENT
Start: 2021-02-06 | End: 2022-12-22

## 2021-02-06 RX ORDER — CEFDINIR 300 MG/1
300 CAPSULE ORAL 2 TIMES DAILY
Qty: 14 CAPSULE | Refills: 0 | Status: SHIPPED | OUTPATIENT
Start: 2021-02-06 | End: 2021-10-05

## 2021-02-06 RX ORDER — SODIUM CHLORIDE 0.9 % (FLUSH) 0.9 %
10 SYRINGE (ML) INJECTION AS NEEDED
Status: DISCONTINUED | OUTPATIENT
Start: 2021-02-06 | End: 2021-02-06 | Stop reason: HOSPADM

## 2021-02-06 RX ADMIN — SODIUM CHLORIDE 500 ML: 9 INJECTION, SOLUTION INTRAVENOUS at 17:17

## 2021-02-06 RX ADMIN — CEFTRIAXONE SODIUM 1 G: 10 INJECTION, POWDER, FOR SOLUTION INTRAVENOUS at 18:19

## 2021-02-06 RX ADMIN — ONDANSETRON 4 MG: 2 INJECTION, SOLUTION INTRAMUSCULAR; INTRAVENOUS at 17:17

## 2021-02-06 RX ADMIN — MORPHINE SULFATE 4 MG: 4 INJECTION INTRAVENOUS at 17:17

## 2021-02-06 NOTE — ED NOTES
Pt reports she has been involuntarily urinating on herself x 1 week, pt also reports lower back pain and one emesis that started today, pt reports this has happened before when she thought she had a STI but resolved on it's own, pt also reports she has developed chills today. Pt was able to urinate voluntarily for a urine sample. Pt has no problem with ambulation.      Erin Nagy, RN  02/06/21 [id number]

## 2021-02-06 NOTE — ED PROVIDER NOTES
"Subjective   Chief complaint: Patient is pleasant 30-year-old female.  She has achy low back pain and discomfort over the last couple days.  She has had some urinary issues with frequency and occasional incontinence.  She had this issue a few weeks ago was diagnosed with UTI.  She took antibiotics and got better.  However her symptoms have returned.  She feels like maybe she had a fever at some point over the last couple days as well.  No numbness no weakness in her legs.  Her pain seems to be more isolated to her flank.  Patient notes vaginal bleeding as well.  Less than a normal menstrual cycle bleeding.  She does however have irregular periods from time to time.  She has had sexual transmitted infections in the past.  She does not think that she has one but she would prefer to be tested because she is \"not sure\".  She is having some abdominal and pelvic pain.    Context: As above    Duration: Few days    Timing: Persistent    Severity: Severe    Associated Symptoms: Negative except as noted above.  Appropriate PPE was used.        PCP:  LMP:          Review of Systems   Constitutional: Positive for fever.   HENT: Negative.    Eyes: Negative.    Respiratory: Negative.    Cardiovascular: Negative.    Gastrointestinal: Negative.    Genitourinary: Positive for difficulty urinating, flank pain and frequency.   Musculoskeletal: Positive for back pain.   Skin: Negative.    Neurological: Negative.    Psychiatric/Behavioral: Negative.        Past Medical History:   Diagnosis Date   • Abdominal pain, epigastric 06/20/2017   • Anxiety disorder 11/07/2018   • Borderline personality disorder (CMS/HCC)    • Chronic pain syndrome 09/11/2017   • Contraceptive management 01/23/2018   • Depression     under care of psych   • Dysuria 11/30/2017   • Genital herpes 11/20/2017   • High risk sexual behavior 11/20/2017   • HPV (human papilloma virus) anogenital infection    • Irregular menstruation 11/30/2017    refer to OB/GYN for " further management 11-   • Panic disorder    • Poor compliance with medication 02/21/2019   • Positive skin test for tuberculosis     Tuberculosis positive   • Psychiatric illness    • Pyelonephritis, acute 06/20/2017    Improved 06-   • Right knee pain 01/23/2018    Discussed symptomatic treatment, activity as tolerate. 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    Social History     Socioeconomic History   • Marital status: Single     Spouse name: Not on file   • Number of children: Not on file   • Years of education: Not on file   • Highest education level: Not on file   Tobacco Use   • Smoking status: Current Every Day Smoker     Types: Cigarettes   • Smokeless tobacco: Never Used   • Tobacco comment: Passive Smoke: Y   Substance and Sexual Activity   • Alcohol use: No     Frequency: Never   • Drug use: Not Currently     Types: Cocaine(coke), Benzodiazepines, Oxycodone, Hydrocodone, Marijuana   • Sexual activity: Never           Objective   Physical Exam  Vitals signs and nursing note reviewed. Exam conducted with a chaperone present.   Constitutional:       Appearance: Normal appearance.   HENT:      Head: Normocephalic and atraumatic.   Eyes:      Extraocular Movements: Extraocular movements intact.      Pupils: Pupils are equal, round, and reactive to light.   Neck:      Musculoskeletal: Normal range of motion.   Cardiovascular:      Rate and Rhythm: Normal rate and regular rhythm.  "     Pulses: Normal pulses.      Heart sounds: Normal heart sounds.   Pulmonary:      Effort: Pulmonary effort is normal.      Breath sounds: Normal breath sounds.   Abdominal:      Tenderness: There is no abdominal tenderness.   Genitourinary:     Vagina: Bleeding present.      Cervix: No cervical motion tenderness or discharge.      Uterus: Normal.       Comments: Normal external rectum  Musculoskeletal: Normal range of motion.      Lumbar back: She exhibits tenderness. She exhibits no swelling and no edema.        Back:    Skin:     General: Skin is warm and dry.      Capillary Refill: Capillary refill takes less than 2 seconds.   Neurological:      General: No focal deficit present.      Mental Status: She is alert and oriented to person, place, and time.      Cranial Nerves: No cranial nerve deficit.      Sensory: No sensory deficit.      Motor: No weakness.      Coordination: Coordination normal.      Deep Tendon Reflexes: Reflexes normal.   Psychiatric:         Mood and Affect: Mood normal.         Behavior: Behavior normal.         Thought Content: Thought content normal.         Judgment: Judgment normal.         Procedures           ED Course                                           MDM  Number of Diagnoses or Management Options  Diagnosis management comments: Patient having the same symptoms she had when she had a UTI last time.  She does have a history of sexual transmitted infections.  However she states she has not been with anybody since last time she was treated.  But she was bleeding and did verbalized that she wanted to have a pelvic exam \"just in case\".  She has no cervical motion tenderness.  She has some bleeding.  She states she is on Depo however does bleed intermittently with this.  Her bleeding is not heavy.  She does have urinary tract infection.  She did get a dose of Rocephin here in the emergency department.  Likely that her urinary frequency and urgency is secondary to a sending UTI.  " She has no fever.  Lactic is normal.  She has back tenderness generalized low.  She is not an IV drug injector.  She is up ambulating in the room.  I do not think she has any kind of spinal pathology.  This is the same thing she has had a few weeks ago.  Patient went to the bathroom and voided on her own and provided us a urinary sample here in the emergency department.       Amount and/or Complexity of Data Reviewed  Clinical lab tests: reviewed  Tests in the radiology section of CPT®: reviewed  Independent visualization of images, tracings, or specimens: yes    Risk of Complications, Morbidity, and/or Mortality  Presenting problems: moderate  Management options: moderate    Patient Progress  Patient progress: improved      Final diagnoses:   None   Ascending urinary tract infection  Dysfunctional uterine bleeding  Pelvic pain         Baudilio Sandhu DO  02/06/21 2018       Baudilio Sandhu DO  02/06/21 2018

## 2021-02-08 LAB — BACTERIA SPEC AEROBE CULT: ABNORMAL

## 2021-02-09 LAB
C TRACH RRNA SPEC QL NAA+PROBE: NEGATIVE
N GONORRHOEA RRNA SPEC QL NAA+PROBE: NEGATIVE

## 2021-03-02 ENCOUNTER — OFFICE VISIT (OUTPATIENT)
Dept: PSYCHIATRY | Facility: CLINIC | Age: 31
End: 2021-03-02

## 2021-03-02 DIAGNOSIS — F41.1 GENERALIZED ANXIETY DISORDER: Chronic | ICD-10-CM

## 2021-03-02 DIAGNOSIS — F20.0 SCHIZOPHRENIA, PARANOID (HCC): Primary | ICD-10-CM

## 2021-03-02 DIAGNOSIS — F63.3 TRICHOTILLOMANIA: Chronic | ICD-10-CM

## 2021-03-02 PROCEDURE — 99214 OFFICE O/P EST MOD 30 MIN: CPT | Performed by: PHYSICIAN ASSISTANT

## 2021-03-02 RX ORDER — OLANZAPINE 5 MG/1
5 TABLET ORAL NIGHTLY
Qty: 30 TABLET | Refills: 2 | Status: SHIPPED | OUTPATIENT
Start: 2021-03-02 | End: 2021-06-01 | Stop reason: SDUPTHER

## 2021-03-02 NOTE — PROGRESS NOTES
Subjective   Annmarie Perera is a 30 y.o.white female who presents today for follow up in the office    Chief Complaint:  Anxiety, hearing voices, hair pulling    History of Present Illness:   She was put in [facility] in Sept 2019 and then transferred to [facility] for 11 months, discharged in Sept 2020 to a Group home in [place] x 3 wks but left and went back home with her Dad  She sees Dr. Resendez tomorrow for her Depo injection, will discuss nipple d/c, last prolactin level was normal  Had her last Abilify Maintenna injection on Feb 24th, gets it at the [facility] on [address], next due March 24  She has been having trouble sleeping, did better on the Zyprexa and would like to go back on it instead of the Abilify tablets  She was on Zyprexa while in the hospital but Salazar (Junior Kasper, NP via phone visit) took her off the Zyprexa and put her on Abilify Maintenna plus the Abilify 30mg tabs and weaning her off the Abilify  Dimitris (son) in foster care and cannot get him back but her daughter (lyudmila) Dad got custody and he lets her have the baby while he works (2yrs old now)  She was treated for Syphilis before she was admitted, got 3 antibiotic shots  She admits that sometimes she still gets paranoid and sometimes hears voices, felt better on Zyprexa  Depression 4/10  Denies SI/HI  Anxiety 7/10, says Buspar does not help      The following portions of the patient's history were reviewed and updated as appropriate: allergies, current medications, past family history, past medical history, past social history, past surgical history and problem list.    PAST PSYCHIATRIC HISTORY  Axis I  Affective/Bipoloar Disorder, Anxiety/Panic Disorder, Cognitive Disorder  Axis II  None,     PAST OUTPATIENT TREATMENT  Diagnosis treated:  Affective Disorder, Anxiety/Panic Disorder, Cognitive Disorder  Treatment Type:  Individual Therapy, Group Therapy, Medication Management  Prior  Psychiatric Medications:  Pristiq  Saphris  Lamictal    Lexapro, no emotion  risperidone - she did not like how she was feeling so she stopped all of them  Neurontin   Prozac, Zoloft, did not like , no emotions  Abilify  Clonazepam - effective   Vraylar - increased agitation   Seroquel  Trazodone, did not like it  Palperidone caused hands and arms to draw up  Buspar  Zyprexa, hyperprolactinemia  Abilify Maintenna  Support Groups:  Narcotics Anonymous (NA)  Sequelae Of Mental Disorder:  suicide attempt, arrest, social isolation, family disruption, financial hardships, emotional distress      Interval History  Improved    Side Effects  None    Past Psych Hx was reviewed and compared to 1/20/21 visit and appropriate updates were made.    Past Medical History:  Past Medical History:   Diagnosis Date   • Abdominal pain, epigastric 06/20/2017   • Anxiety disorder 11/07/2018   • Borderline personality disorder (CMS/[facility])    • Chronic pain syndrome 09/11/2017   • Contraceptive management 01/23/2018   • Depression     under care of psych   • Dysuria 11/30/2017   • Genital herpes 11/20/2017   • High risk sexual behavior 11/20/2017   • HPV (human papilloma virus) anogenital infection    • Irregular menstruation 11/30/2017    refer to OB/GYN for further management 11-   • Panic disorder    • Poor compliance with medication 02/21/2019   • Positive skin test for tuberculosis     Tuberculosis positive   • Pyelonephritis, acute 06/20/2017    Improved 06-   • Right knee pain 01/23/2018    Discussed symptomatic treatment, activity as tolerate. 01-   • Schizoaffective disorder (CMS/[facility])    • Schizophrenia, paranoid (CMS/HCC) 07/14/2016    deteriorated 04-   • Substance abuse (CMS/[facility])    • Trichotillomania 07/14/2016   • Vaginitis 11/20/2017   • Withdrawal symptoms, drug or narcotic (CMS/[facility])        Social History:  Social History     Socioeconomic History   • Marital status: Single     Spouse name: Not on  file   • Number of children: Not on file   • Years of education: Not on file   • Highest education level: Not on file   Tobacco Use   • Smoking status: Current Every Day Smoker     Types: Cigarettes   • Smokeless tobacco: Never Used   • Tobacco comment: Passive Smoke: Y   Substance and Sexual Activity   • Alcohol use: No     Frequency: Never   • Drug use: Not Currently     Types: Cocaine(coke), Benzodiazepines, Oxycodone, Hydrocodone, Marijuana   • Sexual activity: Never       Family History:  History reviewed. No pertinent family history.    Past Surgical History:  Past Surgical History:   Procedure Laterality Date   • PAP SMEAR  2017    Abstraction from [facility]       Problem List:  Patient Active Problem List   Diagnosis   • Abdominal pain, epigastric   • Generalized anxiety disorder   • Chronic pain disorder   • Contraceptive management   • Difficult or painful urination   • Genital herpes   • High risk sexual behavior   • Irregular menstruation   • Knee pain, right   • Poor compliance with medication   • Pyelonephritis, acute   • Vaginitis   • Schizophrenia, paranoid (CMS/HCC)   • Trichotillomania   • Tobacco use   • Acute opioid withdrawal (CMS/HCC)   • Panic disorder       Allergy:   Allergies   Allergen Reactions   • Haldol [Haloperidol] Other (See Comments) and Mental Status Change     Abstraction from [facility]         Discontinued Medications:  Medications Discontinued During This Encounter   Medication Reason   • ARIPiprazole (ABILIFY) 15 MG tablet        Current Medications:   Current Outpatient Medications   Medication Sig Dispense Refill   • Abilify Maintena 400 MG Suspension Reconstituted ER IM injection ER Inject 400 mg into the appropriate muscle as directed by prescriber Every 28 (Twenty-Eight) Days. 1 each 2   • busPIRone (BUSPAR) 15 MG tablet      • Calcium Carbonate-Vitamin D (calcium-vitamin D) 500-200 MG-UNIT tablet per tablet      • cefdinir (OMNICEF) 300 MG capsule Take  1 capsule by mouth 2 (Two) Times a Day. 14 capsule 0   • Cholecalciferol (Vitamin D3) 250 MCG (09345 UT) tablet      • diclofenac (VOLTAREN) 75 MG EC tablet Take 1 tablet by mouth 2 (Two) Times a Day. 20 tablet 0   • medroxyPROGESTERone (DEPO-PROVERA) 150 MG/ML injection      • OLANZapine (ZyPREXA) 5 MG tablet Take 1 tablet by mouth Every Night. 30 tablet 2   • ondansetron ODT (ZOFRAN-ODT) 4 MG disintegrating tablet Place 1 tablet on the tongue Every 8 (Eight) Hours As Needed for Nausea or Vomiting. 20 tablet 0     No current facility-administered medications for this visit.          Review of Symptoms:    Psychiatric/Behavioral: Negative for agitation, behavioral problems, confusion, decreased concentration, dysphoric mood, hallucinations, self-injury, sleep disturbance and suicidal ideas. The patient is nervous/anxious and is not hyperactive.        Physical Exam:   not currently breastfeeding.    Mental Status Exam:   Hygiene:   fair  Cooperation:  Cooperative  Eye Contact:  Good  Psychomotor Behavior:  Appropriate  Affect:  Blunted  Mood: Normal  Hopelessness: Denies  Speech:  Normal rate and volume  Thought Process:  Goal directed  Thought Content:  Normal  Suicidal:  None  Homicidal:  None  Hallucinations:  Auditory (occasional now)  Delusion:  None currently  Memory:  Deficits  Orientation:  Person, Place, Time and Situation  Reliability:  Fair  Insight:  Fair  Judgement: Fair    Impulse Control:  Fair  Physical/Medical Issues:  No      Mental Status Exam was reviewed and compared to 1/20/21 visit and updates were made.     PHQ-9 Depression Screening  Little interest or pleasure in doing things? 1   Feeling down, depressed, or hopeless? 2   Trouble falling or staying asleep, or sleeping too much? 2   Feeling tired or having little energy? 1   Poor appetite or overeating? 1   Feeling bad about yourself - or that you are a failure or have let yourself or your family down? 2   Trouble concentrating on things,  such as reading the newspaper or watching television? 1   Moving or speaking so slowly that other people could have noticed? Or the opposite - being so fidgety or restless that you have been moving around a lot more than usual? 0   Thoughts that you would be better off dead, or of hurting yourself in some way? 0   PHQ-9 Total Score 10   If you checked off any problems, how difficult have these problems made it for you to do your work, take care of things at home, or get along with other people? Somewhat difficult           Current every day smoker less than 3 minutes spent counseling Not agreeable to stopping    I advised Annmarie of the risks of tobacco use.     Lab Results:   Admission on 02/06/2021, Discharged on 02/06/2021   Component Date Value Ref Range Status   • ADENOVIRUS, PCR 02/06/2021 Not Detected  Not Detected Final   • Coronavirus 229E 02/06/2021 Not Detected  Not Detected Final   • Coronavirus HKU1 02/06/2021 Not Detected  Not Detected Final   • Coronavirus NL63 02/06/2021 Not Detected  Not Detected Final   • Coronavirus OC43 02/06/2021 Not Detected  Not Detected Final   • COVID19 02/06/2021 Not Detected  Not Detected - Ref. Range Final   • Human Metapneumovirus 02/06/2021 Not Detected  Not Detected Final   • Human Rhinovirus/Enterovirus 02/06/2021 Not Detected  Not Detected Final   • Influenza A PCR 02/06/2021 Not Detected  Not Detected Final   • Influenza B PCR 02/06/2021 Not Detected  Not Detected Final   • Parainfluenza Virus 1 02/06/2021 Not Detected  Not Detected Final   • Parainfluenza Virus 2 02/06/2021 Not Detected  Not Detected Final   • Parainfluenza Virus 3 02/06/2021 Not Detected  Not Detected Final   • Parainfluenza Virus 4 02/06/2021 Not Detected  Not Detected Final   • RSV, PCR 02/06/2021 Not Detected  Not Detected Final   • Bordetella pertussis pcr 02/06/2021 Not Detected  Not Detected Final   • Bordetella parapertussis PCR 02/06/2021 Not Detected  Not Detected Final   • Chlamydophila  pneumoniae PCR 02/06/2021 Not Detected  Not Detected Final   • Mycoplasma pneumo by PCR 02/06/2021 Not Detected  Not Detected Final   • Glucose 02/06/2021 103* 65 - 99 mg/dL Final   • BUN 02/06/2021 10  6 - 20 mg/dL Final   • Creatinine 02/06/2021 0.80  0.57 - 1.00 mg/dL Final   • Sodium 02/06/2021 141  136 - 145 mmol/L Final   • Potassium 02/06/2021 4.5  3.5 - 5.2 mmol/L Final   • Chloride 02/06/2021 104  98 - 107 mmol/L Final   • CO2 02/06/2021 27.0  22.0 - 29.0 mmol/L Final   • Calcium 02/06/2021 9.3  8.6 - 10.5 mg/dL Final   • Total Protein 02/06/2021 7.8  6.0 - 8.5 g/dL Final   • Albumin 02/06/2021 4.60  3.50 - 5.20 g/dL Final   • ALT (SGPT) 02/06/2021 6  1 - 33 U/L Final   • AST (SGOT) 02/06/2021 14  1 - 32 U/L Final   • Alkaline Phosphatase 02/06/2021 71  39 - 117 U/L Final   • Total Bilirubin 02/06/2021 0.8  0.0 - 1.2 mg/dL Final   • eGFR Non African Amer 02/06/2021 83  >60 mL/min/1.73 Final   • Globulin 02/06/2021 3.2  gm/dL Final   • A/G Ratio 02/06/2021 1.4  g/dL Final   • BUN/Creatinine Ratio 02/06/2021 12.5  7.0 - 25.0 Final   • Anion Gap 02/06/2021 10.0  5.0 - 15.0 mmol/L Final   • Protime 02/06/2021 11.3  9.6 - 11.7 Seconds Final   • INR 02/06/2021 1.03  0.93 - 1.10 Final   • PTT 02/06/2021 28.4  24.0 - 31.0 seconds Final   • HCG, Urine QL 02/06/2021 Negative  Negative Final   • Color, UA 02/06/2021 Dark Yellow* Yellow, Straw Final    Result checked    • Appearance, UA 02/06/2021 Cloudy* Clear Final    Result checked    • pH, UA 02/06/2021 6.0  5.0 - 8.0 Final   • Specific Gravity, UA 02/06/2021 1.020  1.005 - 1.030 Final   • Glucose, UA 02/06/2021 Negative  Negative Final   • Ketones, UA 02/06/2021 Trace* Negative Final   • Bilirubin, UA 02/06/2021 Negative  Negative Final   • Blood, UA 02/06/2021 Large (3+)* Negative Final   • Protein, UA 02/06/2021 100 mg/dL (2+)* Negative Final   • Leuk Esterase, UA 02/06/2021 Large (3+)* Negative Final   • Nitrite, UA 02/06/2021 Positive* Negative Final   •  Urobilinogen, UA 02/06/2021 0.2 E.U./dL  0.2 - 1.0 E.U./dL Final   • Lipase 02/06/2021 13  13 - 60 U/L Final   • WBC 02/06/2021 11.80* 3.40 - 10.80 10*3/mm3 Final   • RBC 02/06/2021 4.46  3.77 - 5.28 10*6/mm3 Final   • Hemoglobin 02/06/2021 13.4  12.0 - 15.9 g/dL Final   • Hematocrit 02/06/2021 40.2  34.0 - 46.6 % Final   • MCV 02/06/2021 90.2  79.0 - 97.0 fL Final   • MCH 02/06/2021 30.2  26.6 - 33.0 pg Final   • MCHC 02/06/2021 33.5  31.5 - 35.7 g/dL Final   • RDW 02/06/2021 13.8  12.3 - 15.4 % Final   • RDW-SD 02/06/2021 43.8  37.0 - 54.0 fl Final   • MPV 02/06/2021 9.5  6.0 - 12.0 fL Final   • Platelets 02/06/2021 197  140 - 450 10*3/mm3 Final   • Neutrophil % 02/06/2021 84.6* 42.7 - 76.0 % Final   • Lymphocyte % 02/06/2021 8.5* 19.6 - 45.3 % Final   • Monocyte % 02/06/2021 6.3  5.0 - 12.0 % Final   • Eosinophil % 02/06/2021 0.1* 0.3 - 6.2 % Final   • Basophil % 02/06/2021 0.5  0.0 - 1.5 % Final   • Neutrophils, Absolute 02/06/2021 9.90* 1.70 - 7.00 10*3/mm3 Final   • Lymphocytes, Absolute 02/06/2021 1.00  0.70 - 3.10 10*3/mm3 Final   • Monocytes, Absolute 02/06/2021 0.70  0.10 - 0.90 10*3/mm3 Final   • Eosinophils, Absolute 02/06/2021 0.00  0.00 - 0.40 10*3/mm3 Final   • Basophils, Absolute 02/06/2021 0.10  0.00 - 0.20 10*3/mm3 Final   • nRBC 02/06/2021 0.0  0.0 - 0.2 /100 WBC Final   • RBC, UA 02/06/2021 13-20* None Seen /HPF Final   • WBC, UA 02/06/2021 Too Numerous to Count* None Seen /HPF Final   • Bacteria, UA 02/06/2021 2+* None Seen /HPF Final   • Squamous Epithelial Cells, UA 02/06/2021 0-2  None Seen, 0-2 /HPF Final   • Renal Epithelial Cells, UA 02/06/2021 3-6* 0 - 2 /HPF Final   • Hyaline Casts, UA 02/06/2021 None Seen  None Seen /LPF Final   • Amorphous Crystals, UA 02/06/2021 Small/1+  None Seen /HPF Final   • Methodology 02/06/2021 Manual Light Microscopy   Final   • Urine Culture 02/06/2021 >100,000 CFU/mL Escherichia coli*  Final   • YEAST 02/06/2021 No yeast seen  No yeast seen Final   • HYPHAL  ELEMENTS 02/06/2021 No Hyphal elements seen  No Hyphal elements seen Final   • WBC'S 02/06/2021 1+ WBC's seen* No WBC's seen Final   • Clue Cells, Wet Prep 02/06/2021 No Clue cells seen  No Clue cells seen Final   • Trichomonas, Wet Prep 02/06/2021 No Trichomonas seen  No Trichomonas seen Final   • Lactate 02/06/2021 0.6  0.5 - 2.0 mmol/L Final    Serial Number: [id number]:  [id number]   • Chlamydia trachomatis, ELLA 02/06/2021 Negative  Negative Final   • Neisseria gonorrhoeae, ELLA 02/06/2021 Negative  Negative Final   Admission on 12/25/2020, Discharged on 12/25/2020   Component Date Value Ref Range Status   • HCG, Urine QL 12/25/2020 Negative  Negative Final   • Color, UA 12/25/2020 Dark Yellow* Yellow, Straw Final   • Appearance, UA 12/25/2020 Clear  Clear Final   • pH, UA 12/25/2020 6.0  5.0 - 8.0 Final   • Specific Gravity, UA 12/25/2020 >=1.030  1.005 - 1.030 Final   • Glucose, UA 12/25/2020 Negative  Negative Final   • Ketones, UA 12/25/2020 15 mg/dL (1+)* Negative Final   • Bilirubin, UA 12/25/2020 Small (1+)* Negative Final    Confirmation testing is unavailable.  A serum bilirubin is recommended for further assessment.   • Blood, UA 12/25/2020 Negative  Negative Final   • Protein, UA 12/25/2020 30 mg/dL (1+)* Negative Final   • Leuk Esterase, UA 12/25/2020 Negative  Negative Final   • Nitrite, UA 12/25/2020 Negative  Negative Final   • Urobilinogen, UA 12/25/2020 0.2 E.U./dL  0.2 - 1.0 E.U./dL Final   • YEAST 12/25/2020 No yeast seen  No yeast seen Final   • HYPHAL ELEMENTS 12/25/2020 No Hyphal elements seen  No Hyphal elements seen Final   • WBC'S 12/25/2020 1+ WBC's seen* No WBC's seen Final   • Clue Cells, Wet Prep 12/25/2020 1+ Clue cells seen* No Clue cells seen Final   • Trichomonas, Wet Prep 12/25/2020 No Trichomonas seen  No Trichomonas seen Final   • RPR 12/25/2020 Non-Reactive  Non-Reactive Final   • Glucose 12/25/2020 104* 65 - 99 mg/dL Final   • BUN 12/25/2020 18  6 - 20 mg/dL Final   •  Creatinine 12/25/2020 0.90  0.57 - 1.00 mg/dL Final   • Sodium 12/25/2020 137  136 - 145 mmol/L Final   • Potassium 12/25/2020 3.7  3.5 - 5.2 mmol/L Final   • Chloride 12/25/2020 101  98 - 107 mmol/L Final   • CO2 12/25/2020 26.0  22.0 - 29.0 mmol/L Final   • Calcium 12/25/2020 9.3  8.6 - 10.5 mg/dL Final   • Total Protein 12/25/2020 7.1  6.0 - 8.5 g/dL Final   • Albumin 12/25/2020 4.70  3.50 - 5.20 g/dL Final   • ALT (SGPT) 12/25/2020 11  1 - 33 U/L Final   • AST (SGOT) 12/25/2020 16  1 - 32 U/L Final   • Alkaline Phosphatase 12/25/2020 83  39 - 117 U/L Final   • Total Bilirubin 12/25/2020 1.0  0.0 - 1.2 mg/dL Final   • eGFR Non African Amer 12/25/2020 73  >60 mL/min/1.73 Final   • Globulin 12/25/2020 2.4  gm/dL Final   • A/G Ratio 12/25/2020 2.0  g/dL Final   • BUN/Creatinine Ratio 12/25/2020 20.0  7.0 - 25.0 Final   • Anion Gap 12/25/2020 10.0  5.0 - 15.0 mmol/L Final   • Lipase 12/25/2020 17  13 - 60 U/L Final   • HIV-1/ HIV-2 12/25/2020 Non-Reactive  Non-Reactive Final    A non-reactive test result does not preclude the possibility of exposure to HIV or infection with HIV. An antibody response to recent exposure may take several months to reach detectable levels.   • WBC 12/25/2020 6.70  3.40 - 10.80 10*3/mm3 Final   • RBC 12/25/2020 4.86  3.77 - 5.28 10*6/mm3 Final   • Hemoglobin 12/25/2020 14.7  12.0 - 15.9 g/dL Final   • Hematocrit 12/25/2020 43.2  34.0 - 46.6 % Final   • MCV 12/25/2020 88.9  79.0 - 97.0 fL Final   • MCH 12/25/2020 30.4  26.6 - 33.0 pg Final   • MCHC 12/25/2020 34.1  31.5 - 35.7 g/dL Final   • RDW 12/25/2020 13.2  12.3 - 15.4 % Final   • RDW-SD 12/25/2020 41.1  37.0 - 54.0 fl Final   • MPV 12/25/2020 9.1  6.0 - 12.0 fL Final   • Platelets 12/25/2020 272  140 - 450 10*3/mm3 Final   • Neutrophil % 12/25/2020 62.7  42.7 - 76.0 % Final   • Lymphocyte % 12/25/2020 27.5  19.6 - 45.3 % Final   • Monocyte % 12/25/2020 7.3  5.0 - 12.0 % Final   • Eosinophil % 12/25/2020 1.3  0.3 - 6.2 % Final   •  Basophil % 12/25/2020 1.2  0.0 - 1.5 % Final   • Neutrophils, Absolute 12/25/2020 4.20  1.70 - 7.00 10*3/mm3 Final   • Lymphocytes, Absolute 12/25/2020 1.80  0.70 - 3.10 10*3/mm3 Final   • Monocytes, Absolute 12/25/2020 0.50  0.10 - 0.90 10*3/mm3 Final   • Eosinophils, Absolute 12/25/2020 0.10  0.00 - 0.40 10*3/mm3 Final   • Basophils, Absolute 12/25/2020 0.10  0.00 - 0.20 10*3/mm3 Final   • nRBC 12/25/2020 0.2  0.0 - 0.2 /100 WBC Final   • RBC, UA 12/25/2020 0-2* None Seen /HPF Final   • WBC, UA 12/25/2020 3-5* None Seen /HPF Final   • Bacteria, UA 12/25/2020 None Seen  None Seen /HPF Final   • Squamous Epithelial Cells, UA 12/25/2020 0-2  None Seen, 0-2 /HPF Final   • Hyaline Casts, UA 12/25/2020 None Seen  None Seen /LPF Final   • Methodology 12/25/2020 Manual Light Microscopy   Final   • Chlamydia trachomatis, ELLA 12/25/2020 Negative  Negative Final   • Neisseria gonorrhoeae, ELLA 12/25/2020 Negative  Negative Final       Assessment/Plan   Problems Addressed this Visit        Mental Health    Generalized anxiety disorder (Chronic)    Relevant Medications    OLANZapine (ZyPREXA) 5 MG tablet    Schizophrenia, paranoid (CMS/HCC) - Primary (Chronic)    Relevant Medications    OLANZapine (ZyPREXA) 5 MG tablet    Trichotillomania (Chronic)    Relevant Medications    OLANZapine (ZyPREXA) 5 MG tablet      Diagnoses       Codes Comments    Schizophrenia, paranoid (CMS/HCC)    -  Primary ICD-10-CM: F20.0  ICD-9-CM: 295.30     Generalized anxiety disorder     ICD-10-CM: F41.1  ICD-9-CM: 300.02     Trichotillomania     ICD-10-CM: F63.3  ICD-9-CM: 312.39           Visit Diagnoses:    ICD-10-CM ICD-9-CM   1. Schizophrenia, paranoid (CMS/HCC)  F20.0 295.30   2. Generalized anxiety disorder  F41.1 300.02   3. Trichotillomania  F63.3 312.39       TREATMENT PLAN/GOALS: Continue supportive psychotherapy efforts and medications as indicated. Treatment and medication options discussed during today's visit. Patient ackowledged and  verbally consented to continue with current treatment plan and was educated on the importance of compliance with treatment and follow-up appointments.    MEDICATION ISSUES:  INSPECT reviewed as expected  Discussed medication options and treatment plan of prescribed medication as well as the risks, benefits, and side effects including potential falls, possible impaired driving and metabolic adversities among others. Patient is agreeable to call the office with any worsening of symptoms or onset of side effects. Patient is agreeable to call 911 or go to the nearest ER should he/she begin having SI/HI. No medication side effects or related complaints today.     Patient spent almost a year in [facility], had been going to [facility] since her discharge.    She was on Zyprexa while inpatient, but [facility] changed her to Abilify Maintena, as well as, oral Abilify due to breast lactation with the Zyprexa.  She reports doing much better with Zyprexa, lactate levels are normal  Continue Abilify Maintena 400 mg injection every 28 days, next due March 24  Change the Abilify 15mg tabs to Zyprexa 5mg QHS  Patient is asking to have Xanax restarted, does not think the BuSpar helps.  Due to her history of substance abuse and illicit drug screens, will not prescribe her narcotics.  Continue Buspar 15mg TID for anxiety    MEDS ORDERED DURING VISIT:  New Medications Ordered This Visit   Medications   • OLANZapine (ZyPREXA) 5 MG tablet     Sig: Take 1 tablet by mouth Every Night.     Dispense:  30 tablet     Refill:  2       Return in about 3 months (around 6/2/2021).         This document has been electronically signed by Batool Carter PA-C  March 4, 2021 07:27 EST

## 2021-03-04 PROBLEM — F20.0 PARANOID SCHIZOPHRENIA: Status: ACTIVE | Noted: 2018-07-10

## 2021-05-15 ENCOUNTER — HOSPITAL ENCOUNTER (EMERGENCY)
Facility: HOSPITAL | Age: 31
Discharge: HOME | End: 2021-05-15
Attending: EMERGENCY MEDICINE | Admitting: EMERGENCY MEDICINE

## 2021-05-15 VITALS
WEIGHT: 179.01 LBS | HEART RATE: 72 BPM | HEIGHT: 67 IN | OXYGEN SATURATION: 98 % | TEMPERATURE: 98 F | DIASTOLIC BLOOD PRESSURE: 62 MMHG | BODY MASS INDEX: 28.1 KG/M2 | RESPIRATION RATE: 18 BRPM | SYSTOLIC BLOOD PRESSURE: 110 MMHG

## 2021-05-15 DIAGNOSIS — M54.50 ACUTE MIDLINE LOW BACK PAIN WITHOUT SCIATICA: ICD-10-CM

## 2021-05-15 DIAGNOSIS — N39.0 URINARY TRACT INFECTION WITHOUT HEMATURIA, SITE UNSPECIFIED: Primary | ICD-10-CM

## 2021-05-15 LAB
B-HCG UR QL: NEGATIVE
BACTERIA UR QL AUTO: ABNORMAL /HPF
BACTERIA UR QL AUTO: ABNORMAL /HPF
BILIRUB UR QL STRIP: NEGATIVE
BILIRUB UR QL STRIP: NEGATIVE
CLARITY UR: ABNORMAL
CLARITY UR: ABNORMAL
COLOR UR: ABNORMAL
COLOR UR: YELLOW
GLUCOSE UR STRIP-MCNC: NEGATIVE MG/DL
GLUCOSE UR STRIP-MCNC: NEGATIVE MG/DL
HGB UR QL STRIP.AUTO: ABNORMAL
HGB UR QL STRIP.AUTO: ABNORMAL
HYALINE CASTS UR QL AUTO: ABNORMAL /LPF
HYALINE CASTS UR QL AUTO: ABNORMAL /LPF
KETONES UR QL STRIP: NEGATIVE
KETONES UR QL STRIP: NEGATIVE
LEUKOCYTE ESTERASE UR QL STRIP.AUTO: ABNORMAL
LEUKOCYTE ESTERASE UR QL STRIP.AUTO: ABNORMAL
NITRITE UR QL STRIP: NEGATIVE
NITRITE UR QL STRIP: NEGATIVE
PH UR STRIP.AUTO: 8 [PH] (ref 5–8)
PH UR STRIP.AUTO: 8.5 [PH] (ref 5–8)
PROT UR QL STRIP: ABNORMAL
PROT UR QL STRIP: ABNORMAL
RBC # UR: ABNORMAL /HPF
RBC # UR: ABNORMAL /HPF
REF LAB TEST METHOD: ABNORMAL
REF LAB TEST METHOD: ABNORMAL
RENAL EPI CELLS #/AREA URNS HPF: ABNORMAL /HPF
SP GR UR STRIP: 1.01 (ref 1–1.03)
SP GR UR STRIP: 1.02 (ref 1–1.03)
SQUAMOUS #/AREA URNS HPF: ABNORMAL /HPF
SQUAMOUS #/AREA URNS HPF: ABNORMAL /HPF
UROBILINOGEN UR QL STRIP: ABNORMAL
UROBILINOGEN UR QL STRIP: ABNORMAL
WBC UR QL AUTO: ABNORMAL /HPF
WBC UR QL AUTO: ABNORMAL /HPF

## 2021-05-15 PROCEDURE — 87077 CULTURE AEROBIC IDENTIFY: CPT | Performed by: EMERGENCY MEDICINE

## 2021-05-15 PROCEDURE — 87086 URINE CULTURE/COLONY COUNT: CPT | Performed by: EMERGENCY MEDICINE

## 2021-05-15 PROCEDURE — 87186 SC STD MICRODIL/AGAR DIL: CPT | Performed by: EMERGENCY MEDICINE

## 2021-05-15 PROCEDURE — 81025 URINE PREGNANCY TEST: CPT | Performed by: EMERGENCY MEDICINE

## 2021-05-15 PROCEDURE — 99283 EMERGENCY DEPT VISIT LOW MDM: CPT

## 2021-05-15 PROCEDURE — 81001 URINALYSIS AUTO W/SCOPE: CPT | Performed by: EMERGENCY MEDICINE

## 2021-05-15 RX ORDER — NITROFURANTOIN 25; 75 MG/1; MG/1
100 CAPSULE ORAL 2 TIMES DAILY
Qty: 10 CAPSULE | Refills: 0 | Status: SHIPPED | OUTPATIENT
Start: 2021-05-15 | End: 2022-08-23

## 2021-05-15 NOTE — ED PROVIDER NOTES
Subjective   History of Present Illness  30-year-old female presents with low back pain.  She states that she has urgency but will lose her urine before she get to the bathroom.  She has had no fever.  She has had no IV drug use.  She does not complain of pain numbness or weakness in her legs.  She is having no abdominal pain.  She reports she has had some spotting but is due to have her Depo injection.  Review of Systems  Negative for fever abdominal pain vomiting numbness weakness of legs.     Past Medical History:   Diagnosis Date   • Abdominal pain, epigastric 06/20/2017   • Anxiety disorder 11/07/2018   • Borderline personality disorder (CMS/HCC)    • Chronic pain syndrome 09/11/2017   • Contraceptive management 01/23/2018   • Depression     under care of psych   • Dysuria 11/30/2017   • Genital herpes 11/20/2017   • High risk sexual behavior 11/20/2017   • HPV (human papilloma virus) anogenital infection    • Irregular menstruation 11/30/2017    refer to OB/GYN for further management 11-   • Panic disorder    • Poor compliance with medication 02/21/2019   • Positive skin test for tuberculosis     Tuberculosis positive   • Pyelonephritis, acute 06/20/2017    Improved 06-   • Right knee pain 01/23/2018    Discussed symptomatic treatment, activity as tolerate. 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        Past Surgical History:   Procedure Laterality Date   • PAP SMEAR  2017    [facility]       No family history on file.    Social History     Socioeconomic History   • Marital status: Single     Spouse  name: Not on file   • Number of children: Not on file   • Years of education: Not on file   • Highest education level: Not on file   Tobacco Use   • Smoking status: Current Every Day Smoker     Types: Cigarettes   • Smokeless tobacco: Never Used   • Tobacco comment: Passive Smoke: Y   Substance and Sexual Activity   • Alcohol use: No   • Drug use: Not Currently     Types: Cocaine(coke), Benzodiazepines, Oxycodone, Hydrocodone, Marijuana   • Sexual activity: Never     Prior to Admission medications    Medication Sig Start Date End Date Taking? Authorizing Provider   Abilify Maintena 400 MG Suspension Reconstituted ER IM injection ER Inject 400 mg into the appropriate muscle as directed by prescriber Every 28 (Twenty-Eight) Days. 1/20/21   Batool Carter PA-C   busPIRone (BUSPAR) 15 MG tablet  1/12/21   Martha Pham MD   Calcium Carbonate-Vitamin D (calcium-vitamin D) 500-200 MG-UNIT tablet per tablet  1/4/21   Martha Pham MD   cefdinir (OMNICEF) 300 MG capsule Take 1 capsule by mouth 2 (Two) Times a Day. 2/6/21   Baudilio Sandhu DO   Cholecalciferol (Vitamin D3) 250 MCG (19510 UT) tablet  1/4/21   Martha Pham MD   diclofenac (VOLTAREN) 75 MG EC tablet Take 1 tablet by mouth 2 (Two) Times a Day. 2/6/21   Baudilio Sandhu DO   medroxyPROGESTERone (DEPO-PROVERA) 150 MG/ML injection  11/30/20   Martha Pham MD   OLANZapine (ZyPREXA) 5 MG tablet Take 1 tablet by mouth Every Night. 3/2/21 3/2/22  Batool Carter PA-C   ondansetron ODT (ZOFRAN-ODT) 4 MG disintegrating tablet Place 1 tablet on the tongue Every 8 (Eight) Hours As Needed for Nausea or Vomiting. 2/6/21   Baudilio Sandhu DO     Current medications are Abilify and Zyprexa      Objective   Physical Exam  28-year-old female awake alert.  Generally well-developed well-nourished.  Back she complains of some mild lower back tenderness.  There is no warmth erythema or rash.  Abdomen soft with no tenderness.   Examination of legs 2+ symmetric reflexes knees and ankles motor sensory intact without deficit.  Procedures           ED Course      Results for orders placed or performed during the hospital encounter of 05/15/21   Pregnancy, Urine - Urine, Clean Catch    Specimen: Urine, Clean Catch   Result Value Ref Range    HCG, Urine QL Negative Negative   Urinalysis With Microscopic If Indicated (No Culture) - Urine, Clean Catch    Specimen: Urine, Clean Catch   Result Value Ref Range    Color, UA Felicia (A) Yellow, Straw    Appearance, UA Cloudy (A) Clear    pH, UA 8.5 (H) 5.0 - 8.0    Specific Gravity, UA 1.020 1.005 - 1.030    Glucose, UA Negative Negative    Ketones, UA Negative Negative    Bilirubin, UA Negative Negative    Blood, UA Large (3+) (A) Negative    Protein,  mg/dL (2+) (A) Negative    Leuk Esterase, UA Moderate (2+) (A) Negative    Nitrite, UA Negative Negative    Urobilinogen, UA 1.0 E.U./dL 0.2 - 1.0 E.U./dL   Urinalysis, Microscopic Only - Urine, Clean Catch    Specimen: Urine, Clean Catch   Result Value Ref Range    RBC, UA Too Numerous to Count (A) None Seen /HPF    WBC, UA Too Numerous to Count (A) None Seen /HPF    Bacteria, UA 1+ (A) None Seen /HPF    Squamous Epithelial Cells, UA 3-6 (A) None Seen, 0-2 /HPF    Renal Epithelial Cells, UA 3-6 (A) 0 - 2 /HPF    Hyaline Casts, UA None Seen None Seen /LPF    Methodology Manual Light Microscopy    Urinalysis With Culture If Indicated - Urine, Catheter In/Out    Specimen: Urine, Catheter In/Out   Result Value Ref Range    Color, UA Yellow Yellow, Straw    Appearance, UA Cloudy (A) Clear    pH, UA 8.0 5.0 - 8.0    Specific Gravity, UA 1.010 1.005 - 1.030    Glucose, UA Negative Negative    Ketones, UA Negative Negative    Bilirubin, UA Negative Negative    Blood, UA Small (1+) (A) Negative    Protein, UA 30 mg/dL (1+) (A) Negative    Leuk Esterase, UA Large (3+) (A) Negative    Nitrite, UA Negative Negative    Urobilinogen, UA 1.0 E.U./dL 0.2 - 1.0  "E.U./dL   Urinalysis, Microscopic Only - Urine, Catheter In/Out    Specimen: Urine, Catheter In/Out   Result Value Ref Range    RBC, UA 6-12 (A) None Seen /HPF    WBC, UA Too Numerous to Count (A) None Seen /HPF    Bacteria, UA 1+ (A) None Seen /HPF    Squamous Epithelial Cells, UA None Seen None Seen, 0-2 /HPF    Hyaline Casts, UA 3-6 None Seen /LPF    Methodology Automated Microscopy      No radiology results for the last day  Medications - No data to display  /66 (BP Location: Left arm, Patient Position: Sitting)   Pulse 73   Temp 97.2 °F (36.2 °C) (Oral)   Resp 16   Ht 170.2 cm (67\")   Wt 81.2 kg (179 lb 0.2 oz)   SpO2 100%   BMI 28.04 kg/m²                                        MDM  Patient's initial urine was TNTC red blood cells white blood cells negative urine hCG.  Pelvic exam external was performed and she was noted to have active vaginal bleeding.  A cath urine was then obtained which revealed only 6-12 red cells with TNTC white blood cells 1+ bacteria.  Patient's findings were discussed with her.  She was placed on Macrobid.  Advised to use Tylenol or ibuprofen for pain.  Plenty of fluids.  To follow-up with PMD return new or worsening symptoms  Final diagnoses:   Urinary tract infection without hematuria, site unspecified   Acute midline low back pain without sciatica       ED Disposition  ED Disposition     ED Disposition Condition Comment    Discharge Stable           Denisse Porter, FNP  [address] [place] [id number] [phone]    In 3 days           Medication List      New Prescriptions    nitrofurantoin (macrocrystal-monohydrate) 100 MG capsule  Commonly known as: MACROBID  Take 1 capsule by mouth 2 (Two) Times a Day.           Where to Get Your Medications      These medications were sent to [facility], [place] - [address] [facility] - [phone]  - [phone]   [address] [facility] [place] [address]    Phone: [phone] "   · nitrofurantoin (macrocrystal-monohydrate) 100 MG capsule          Junior Prajapati MD  05/15/21 9428

## 2021-05-17 DIAGNOSIS — F20.0 SCHIZOPHRENIA, PARANOID (HCC): Chronic | ICD-10-CM

## 2021-05-17 LAB — BACTERIA SPEC AEROBE CULT: ABNORMAL

## 2021-05-17 RX ORDER — ARIPIPRAZOLE 400 MG
KIT INTRAMUSCULAR
Qty: 1 EACH | Refills: 1 | Status: SHIPPED | OUTPATIENT
Start: 2021-05-17 | End: 2021-07-09

## 2021-06-01 ENCOUNTER — OFFICE VISIT (OUTPATIENT)
Dept: PSYCHIATRY | Facility: CLINIC | Age: 31
End: 2021-06-01

## 2021-06-01 DIAGNOSIS — F63.3 TRICHOTILLOMANIA: Chronic | ICD-10-CM

## 2021-06-01 DIAGNOSIS — F20.0 SCHIZOPHRENIA, PARANOID (HCC): Chronic | ICD-10-CM

## 2021-06-01 DIAGNOSIS — F41.0 PANIC DISORDER: Primary | Chronic | ICD-10-CM

## 2021-06-01 DIAGNOSIS — F41.1 GENERALIZED ANXIETY DISORDER: Chronic | ICD-10-CM

## 2021-06-01 PROCEDURE — 99214 OFFICE O/P EST MOD 30 MIN: CPT | Performed by: PHYSICIAN ASSISTANT

## 2021-06-01 RX ORDER — OLANZAPINE 5 MG/1
5 TABLET ORAL NIGHTLY
Qty: 90 TABLET | Refills: 1 | Status: SHIPPED | OUTPATIENT
Start: 2021-06-01 | End: 2021-10-05 | Stop reason: ALTCHOICE

## 2021-06-01 RX ORDER — GABAPENTIN 100 MG/1
100 CAPSULE ORAL 3 TIMES DAILY
Qty: 90 CAPSULE | Refills: 2 | Status: SHIPPED | OUTPATIENT
Start: 2021-06-01 | End: 2021-06-21 | Stop reason: DRUGHIGH

## 2021-06-01 NOTE — PROGRESS NOTES
Subjective   Annmarie Perera is a 30 y.o.white female who presents today for follow up in the office    Chief Complaint:  Anxiety, hearing voices, hair pulling better    History of Present Illness:   Says she is doing well, seeing her daughter once a week, supervised visits  Getting the Nexplanon implant soon for birth control  No more hospitalizations    Rare auditory hallucinations or paranoia, able to ignore it, doing well with Zyprexa tabs instead of Abilify tabs but still on Abilify maintenna  Her hair is growing out, hair pulling significantly improved.  She was put in [facility] in Sept 2019 and then transferred to [facility] for 11 months, where she got Zyprexa twice daily, discharged in Sept 2020 to a Group home in [place] x 3 wks but left and went back home with her Dad  She sees Dr. Resendez tomorrow for her Depo injection, will discuss nipple d/c, last prolactin level was normal  Had her last Abilify Maintenna injection in May, next due June 14  She has been having trouble sleeping, did better on the Zyprexa and would like to go back on it instead of the Abilify tablets  She was on Zyprexa while in the hospital but Salazar (Junior Kasper, NP via phone visit) took her off the Zyprexa and put her on Abilify Maintenna plus the Abilify 30mg tabs and weaning her off the Abilify  Dimitris (son) in foster care and cannot get him back but her daughter (lyudmila) Dad got custody and he lets her have the baby while he works (2yrs old now)  She was treated for Syphilis before she was admitted, got 3 antibiotic shots  Depression 4/10  Denies SI/HI  Anxiety 7/10, says Buspar does not help, has taken Gabapentin before and helped      The following portions of the patient's history were reviewed and updated as appropriate: allergies, current medications, past family history, past medical history, past social history, past surgical history and problem list.    PAST PSYCHIATRIC  HISTORY  Axis I  Affective/Bipoloar Disorder, Anxiety/Panic Disorder, Cognitive Disorder  Axis II  None,     PAST OUTPATIENT TREATMENT  Diagnosis treated:  Affective Disorder, Anxiety/Panic Disorder, Cognitive Disorder  Treatment Type:  Individual Therapy, Group Therapy, Medication Management  Prior Psychiatric Medications:  Pristiq  Saphris  Lamictal    Lexapro, no emotion  risperidone - she did not like how she was feeling so she stopped all of them  Neurontin   Prozac, Zoloft, did not like , no emotions  Abilify  Clonazepam - effective   Vraylar - increased agitation   Seroquel  Trazodone, did not like it  Palperidone caused hands and arms to draw up  Buspar  Zyprexa, hyperprolactinemia  Abilify Maintenna  Support Groups:  Narcotics Anonymous (NA)  Sequelae Of Mental Disorder:  suicide attempt, arrest, social isolation, family disruption, financial hardships, emotional distress      Interval History  Improved    Side Effects  None    Past Psych Hx was reviewed and compared to 3/2/21 visit and appropriate updates were made.    Past Medical History:  Past Medical History:   Diagnosis Date   • Abdominal pain, epigastric 06/20/2017   • Borderline personality disorder (CMS/[facility])    • Chronic pain syndrome 09/11/2017   • Contraceptive management 01/23/2018   • Dysuria 11/30/2017   • Genital herpes 11/20/2017   • High risk sexual behavior 11/20/2017   • HPV (human papilloma virus) anogenital infection    • Irregular menstruation 11/30/2017    refer to OB/GYN for further management 11-   • Panic disorder    • Poor compliance with medication 02/21/2019   • Positive skin test for tuberculosis     Tuberculosis positive   • Pyelonephritis, acute 06/20/2017    Improved 06-   • Right knee pain 01/23/2018    Discussed symptomatic treatment, activity as tolerate. 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   • Vaginitis 11/20/2017   • Withdrawal symptoms, drug or narcotic (CMS/[facility])        Social History:  Social History     Socioeconomic History   • Marital status: Single     Spouse name: Not on file   • Number of children: Not on file   • Years of education: Not on file   • Highest education level: Not on file   Tobacco Use   • Smoking status: Current Every Day Smoker     Types: Cigarettes   • Smokeless tobacco: Never Used   • Tobacco comment: Passive Smoke: Y   Substance and Sexual Activity   • Alcohol use: No   • Drug use: Not Currently     Types: Cocaine(coke), Benzodiazepines, Oxycodone, Hydrocodone, Marijuana   • Sexual activity: Never       Family History:  History reviewed. No pertinent family history.    Past Surgical History:  Past Surgical History:   Procedure Laterality Date   • PAP SMEAR  2017    [facility]       Problem List:  Patient Active Problem List   Diagnosis   • Abdominal pain, epigastric   • Generalized anxiety disorder   • Chronic pain disorder   • Contraceptive management   • Difficult or painful urination   • Genital herpes   • High risk sexual behavior   • Irregular menstruation   • Knee pain, right   • Poor compliance with medication   • Pyelonephritis, acute   • Vaginitis   • Schizophrenia, paranoid (CMS/[facility])   • Trichotillomania   • Tobacco use   • Acute opioid withdrawal (CMS/[facility])   • Panic disorder       Allergy:   Allergies   Allergen Reactions   • Haldol [Haloperidol] Other (See Comments) and Mental Status Change     Abstraction from [facility]         Discontinued Medications:  Medications Discontinued During This Encounter   Medication Reason   • OLANZapine (ZyPREXA) 5 MG tablet Reorder   • busPIRone (BUSPAR) 15 MG tablet Not Efficacious       Current Medications:   Current Outpatient Medications   Medication Sig Dispense Refill   • Abilify Maintena 400 MG Suspension Reconstituted ER IM injection ER INJECT  400 MG INTO MUSCLE AS DIRECTED BY PRESCRIBER JIE 28 DAYS 1 each 1   • Calcium Carbonate-Vitamin D (calcium-vitamin D) 500-200 MG-UNIT tablet per tablet      • cefdinir (OMNICEF) 300 MG capsule Take 1 capsule by mouth 2 (Two) Times a Day. 14 capsule 0   • Cholecalciferol (Vitamin D3) 250 MCG (28690 UT) tablet      • diclofenac (VOLTAREN) 75 MG EC tablet Take 1 tablet by mouth 2 (Two) Times a Day. 20 tablet 0   • gabapentin (Neurontin) 100 MG capsule Take 1 capsule by mouth 3 (Three) Times a Day. 90 capsule 2   • medroxyPROGESTERone (DEPO-PROVERA) 150 MG/ML injection      • nitrofurantoin, macrocrystal-monohydrate, (MACROBID) 100 MG capsule Take 1 capsule by mouth 2 (Two) Times a Day. 10 capsule 0   • OLANZapine (ZyPREXA) 5 MG tablet Take 1 tablet by mouth Every Night. 90 tablet 1   • ondansetron ODT (ZOFRAN-ODT) 4 MG disintegrating tablet Place 1 tablet on the tongue Every 8 (Eight) Hours As Needed for Nausea or Vomiting. 20 tablet 0     No current facility-administered medications for this visit.         Review of Symptoms:    Psychiatric/Behavioral: Negative for agitation, behavioral problems, confusion, decreased concentration, dysphoric mood, hallucinations, self-injury, sleep disturbance and suicidal ideas. The patient is nervous/anxious and is not hyperactive.        Physical Exam:   not currently breastfeeding.    Mental Status Exam:   Hygiene:   fair  Cooperation:  Cooperative  Eye Contact:  Good  Psychomotor Behavior:  Appropriate  Affect:  Blunted  Mood: Normal  Hopelessness: Denies  Speech:  Normal rate and volume  Thought Process:  Goal directed  Thought Content:  Normal  Suicidal:  None  Homicidal:  None  Hallucinations:  Auditory (occasional now)  Delusion:  None currently  Memory:  Deficits  Orientation:  Person, Place, Time and Situation  Reliability:  Fair  Insight:  Fair  Judgement: Fair    Impulse Control:  Fair  Physical/Medical Issues:  No      Mental Status Exam was reviewed and compared to  3/2/21 visit and updates were made.     PHQ-9 Depression Screening  Little interest or pleasure in doing things? 1   Feeling down, depressed, or hopeless? 1   Trouble falling or staying asleep, or sleeping too much? 1   Feeling tired or having little energy? 2   Poor appetite or overeating? 1   Feeling bad about yourself - or that you are a failure or have let yourself or your family down? 2   Trouble concentrating on things, such as reading the newspaper or watching television? 1   Moving or speaking so slowly that other people could have noticed? Or the opposite - being so fidgety or restless that you have been moving around a lot more than usual? 0   Thoughts that you would be better off dead, or of hurting yourself in some way? 0   PHQ-9 Total Score 9   If you checked off any problems, how difficult have these problems made it for you to do your work, take care of things at home, or get along with other people? Somewhat difficult           Current every day smoker less than 3 minutes spent counseling Not agreeable to stopping    I advised Annmarie of the risks of tobacco use.     Lab Results:   Admission on 05/15/2021, Discharged on 05/15/2021   Component Date Value Ref Range Status   • HCG, Urine QL 05/15/2021 Negative  Negative Final   • Color, UA 05/15/2021 Felicia* Yellow, Straw Final    Result checked    • Appearance, UA 05/15/2021 Cloudy* Clear Final    Result checked    • pH, UA 05/15/2021 8.5* 5.0 - 8.0 Final   • Specific Gravity, UA 05/15/2021 1.020  1.005 - 1.030 Final   • Glucose, UA 05/15/2021 Negative  Negative Final   • Ketones, UA 05/15/2021 Negative  Negative Final   • Bilirubin, UA 05/15/2021 Negative  Negative Final   • Blood, UA 05/15/2021 Large (3+)* Negative Final   • Protein, UA 05/15/2021 100 mg/dL (2+)* Negative Final   • Leuk Esterase, UA 05/15/2021 Moderate (2+)* Negative Final   • Nitrite, UA 05/15/2021 Negative  Negative Final   • Urobilinogen, UA 05/15/2021 1.0 E.U./dL  0.2 - 1.0  E.U./dL Final   • RBC, UA 05/15/2021 Too Numerous to Count* None Seen /HPF Final   • WBC, UA 05/15/2021 Too Numerous to Count* None Seen /HPF Final   • Bacteria, UA 05/15/2021 1+* None Seen /HPF Final   • Squamous Epithelial Cells, UA 05/15/2021 3-6* None Seen, 0-2 /HPF Final   • Renal Epithelial Cells, UA 05/15/2021 3-6* 0 - 2 /HPF Final   • Hyaline Casts, UA 05/15/2021 None Seen  None Seen /LPF Final   • Methodology 05/15/2021 Manual Light Microscopy   Final   • Color, UA 05/15/2021 Yellow  Yellow, Straw Final   • Appearance, UA 05/15/2021 Cloudy* Clear Final    Result checked    • pH, UA 05/15/2021 8.0  5.0 - 8.0 Final   • Specific Gravity, UA 05/15/2021 1.010  1.005 - 1.030 Final   • Glucose, UA 05/15/2021 Negative  Negative Final   • Ketones, UA 05/15/2021 Negative  Negative Final   • Bilirubin, UA 05/15/2021 Negative  Negative Final   • Blood, UA 05/15/2021 Small (1+)* Negative Final   • Protein, UA 05/15/2021 30 mg/dL (1+)* Negative Final   • Leuk Esterase, UA 05/15/2021 Large (3+)* Negative Final   • Nitrite, UA 05/15/2021 Negative  Negative Final   • Urobilinogen, UA 05/15/2021 1.0 E.U./dL  0.2 - 1.0 E.U./dL Final   • RBC, UA 05/15/2021 6-12* None Seen /HPF Final   • WBC, UA 05/15/2021 Too Numerous to Count* None Seen /HPF Final   • Bacteria, UA 05/15/2021 1+* None Seen /HPF Final   • Squamous Epithelial Cells, UA 05/15/2021 None Seen  None Seen, 0-2 /HPF Final   • Hyaline Casts, UA 05/15/2021 3-6  None Seen /LPF Final   • Methodology 05/15/2021 Automated Microscopy   Final   • Urine Culture 05/15/2021 >100,000 CFU/mL Escherichia coli*  Final       Assessment/Plan   Problems Addressed this Visit        Mental Health    Generalized anxiety disorder (Chronic)    Relevant Medications    OLANZapine (ZyPREXA) 5 MG tablet    Schizophrenia, paranoid (CMS/HCC) (Chronic)    Relevant Medications    OLANZapine (ZyPREXA) 5 MG tablet    Trichotillomania (Chronic)    Relevant Medications    OLANZapine (ZyPREXA) 5 MG  tablet    Panic disorder - Primary (Chronic)    Relevant Medications    OLANZapine (ZyPREXA) 5 MG tablet      Diagnoses       Codes Comments    Panic disorder    -  Primary ICD-10-CM: F41.0  ICD-9-CM: 300.01     Schizophrenia, paranoid (CMS/HCC)     ICD-10-CM: F20.0  ICD-9-CM: 295.30     Trichotillomania     ICD-10-CM: F63.3  ICD-9-CM: 312.39     Generalized anxiety disorder     ICD-10-CM: F41.1  ICD-9-CM: 300.02           Visit Diagnoses:    ICD-10-CM ICD-9-CM   1. Panic disorder  F41.0 300.01   2. Schizophrenia, paranoid (CMS/HCC)  F20.0 295.30   3. Trichotillomania  F63.3 312.39   4. Generalized anxiety disorder  F41.1 300.02       TREATMENT PLAN/GOALS: Continue supportive psychotherapy efforts and medications as indicated. Treatment and medication options discussed during today's visit. Patient ackowledged and verbally consented to continue with current treatment plan and was educated on the importance of compliance with treatment and follow-up appointments.    MEDICATION ISSUES:  INSPECT reviewed as expected  Discussed medication options and treatment plan of prescribed medication as well as the risks, benefits, and side effects including potential falls, possible impaired driving and metabolic adversities among others. Patient is agreeable to call the office with any worsening of symptoms or onset of side effects. Patient is agreeable to call 911 or go to the nearest ER should he/she begin having SI/HI. No medication side effects or related complaints today.     Patient spent almost a year in [facility], had been going to [facility] since her discharge.    She was on Zyprexa while inpatient, but [facility] changed her to Abilify Maintena, as well as, oral Abilify due to breast lactation with the Zyprexa.  She reports doing much better with Zyprexa, lactate levels are normal  Continue Abilify Maintena 400 mg injection every 28 days, next due June 14  Continue Zyprexa 5mg QHS  Patient is asking to  have Xanax restarted, does not think the BuSpar helps.  Due to her history of substance abuse and illicit drug screens, will not prescribe her narcotics.  Gabapentin 100mg TID for anxiety instead, can increase to 300 mg 3 times daily in a few weeks if needed.  D/C BuSpar since it is not helping    MEDS ORDERED DURING VISIT:  New Medications Ordered This Visit   Medications   • gabapentin (Neurontin) 100 MG capsule     Sig: Take 1 capsule by mouth 3 (Three) Times a Day.     Dispense:  90 capsule     Refill:  2   • OLANZapine (ZyPREXA) 5 MG tablet     Sig: Take 1 tablet by mouth Every Night.     Dispense:  90 tablet     Refill:  1       Return in about 3 months (around 9/1/2021).         This document has been electronically signed by Batool Carter PA-C  Mami 3, 2021 08:20 EDT

## 2021-06-21 ENCOUNTER — DOCUMENTATION (OUTPATIENT)
Dept: PSYCHIATRY | Facility: CLINIC | Age: 31
End: 2021-06-21

## 2021-06-21 ENCOUNTER — TELEPHONE (OUTPATIENT)
Dept: PSYCHIATRY | Facility: CLINIC | Age: 31
End: 2021-06-21

## 2021-06-21 RX ORDER — GABAPENTIN 300 MG/1
300 CAPSULE ORAL 3 TIMES DAILY
Qty: 90 CAPSULE | Refills: 2 | Status: SHIPPED | OUTPATIENT
Start: 2021-06-21 | End: 2021-09-14

## 2021-06-21 NOTE — TELEPHONE ENCOUNTER
Annmarie called.Doing well on Neurontin . States she was told to call for an increase to 300 mg after 3 weeks.

## 2021-07-07 DIAGNOSIS — F20.0 SCHIZOPHRENIA, PARANOID (HCC): Chronic | ICD-10-CM

## 2021-07-09 RX ORDER — ARIPIPRAZOLE 400 MG
KIT INTRAMUSCULAR
Qty: 1 EACH | Refills: 5 | Status: SHIPPED | OUTPATIENT
Start: 2021-07-09 | End: 2021-12-18

## 2021-09-14 RX ORDER — GABAPENTIN 300 MG/1
CAPSULE ORAL
Qty: 90 CAPSULE | Refills: 2 | Status: SHIPPED | OUTPATIENT
Start: 2021-09-14 | End: 2021-10-05

## 2021-10-05 ENCOUNTER — OFFICE VISIT (OUTPATIENT)
Dept: PSYCHIATRY | Facility: CLINIC | Age: 31
End: 2021-10-05

## 2021-10-05 DIAGNOSIS — F41.1 GENERALIZED ANXIETY DISORDER: Chronic | ICD-10-CM

## 2021-10-05 DIAGNOSIS — F20.0 SCHIZOPHRENIA, PARANOID (HCC): Primary | Chronic | ICD-10-CM

## 2021-10-05 DIAGNOSIS — F41.0 PANIC DISORDER: Chronic | ICD-10-CM

## 2021-10-05 DIAGNOSIS — F63.3 TRICHOTILLOMANIA: Chronic | ICD-10-CM

## 2021-10-05 PROCEDURE — 99214 OFFICE O/P EST MOD 30 MIN: CPT | Performed by: PHYSICIAN ASSISTANT

## 2021-10-05 RX ORDER — GABAPENTIN 400 MG/1
400 CAPSULE ORAL 3 TIMES DAILY
Qty: 270 CAPSULE | Refills: 1 | Status: SHIPPED | OUTPATIENT
Start: 2021-10-05 | End: 2022-01-12 | Stop reason: DRUGHIGH

## 2021-10-05 RX ORDER — LUMATEPERONE 42 MG/1
42 CAPSULE ORAL
Qty: 28 CAPSULE | Refills: 0
Start: 2021-10-05 | End: 2021-10-18 | Stop reason: ALTCHOICE

## 2021-10-05 RX ORDER — TERBINAFINE HYDROCHLORIDE 250 MG/1
TABLET ORAL
COMMUNITY
Start: 2021-10-01 | End: 2022-08-23

## 2021-10-18 ENCOUNTER — TELEPHONE (OUTPATIENT)
Dept: PSYCHIATRY | Facility: CLINIC | Age: 31
End: 2021-10-18

## 2021-10-18 NOTE — TELEPHONE ENCOUNTER
Tell her to stop the Caplyta.  She can take Zyprexa 5mg today and then increase to 10mg at bedtime of the Zyprexa tomorrow.  She needs to go to ED if chest pain persists to get it evaluated.  When she runs low on the 5mg Zyprexa tabs, call back so I can send a new Rx for the 10mg tabs

## 2021-10-18 NOTE — TELEPHONE ENCOUNTER
Pt states that Caplyta is making her chest hurt and making her shakey.  She would like to go back on Zyprexa at an increased dose.  Please advise.

## 2021-11-08 RX ORDER — OLANZAPINE 10 MG/1
10 TABLET ORAL NIGHTLY
COMMUNITY
End: 2021-11-08 | Stop reason: SDUPTHER

## 2021-11-09 RX ORDER — OLANZAPINE 10 MG/1
10 TABLET ORAL NIGHTLY
Qty: 30 TABLET | Refills: 2 | Status: SHIPPED | OUTPATIENT
Start: 2021-11-09 | End: 2022-02-06

## 2021-11-16 ENCOUNTER — TELEPHONE (OUTPATIENT)
Dept: PSYCHIATRY | Facility: CLINIC | Age: 31
End: 2021-11-16

## 2021-11-16 DIAGNOSIS — F41.1 GENERALIZED ANXIETY DISORDER: Primary | ICD-10-CM

## 2021-11-16 NOTE — TELEPHONE ENCOUNTER
Annmarie is reporting recent loss of her sister- she was found  in [facility] in [place] on Saturday.  Annmarie is requesting something to help her get through the next 2 weeks. Valium or something like it.

## 2021-11-17 RX ORDER — DIAZEPAM 5 MG/1
5 TABLET ORAL DAILY PRN
Qty: 10 TABLET | Refills: 0 | Status: SHIPPED | OUTPATIENT
Start: 2021-11-17 | End: 2021-11-29 | Stop reason: SDUPTHER

## 2021-11-17 NOTE — TELEPHONE ENCOUNTER
She knows that I do not really want her taking benzodiazepines but I will send Valium 5mg to take ONE a day if needed for the next 10 days, no refills

## 2021-11-29 DIAGNOSIS — F41.1 GENERALIZED ANXIETY DISORDER: ICD-10-CM

## 2021-11-29 RX ORDER — DIAZEPAM 5 MG/1
5 TABLET ORAL DAILY PRN
Qty: 10 TABLET | Refills: 0 | Status: SHIPPED | OUTPATIENT
Start: 2021-11-29 | End: 2022-01-12 | Stop reason: ALTCHOICE

## 2021-12-17 DIAGNOSIS — F20.0 SCHIZOPHRENIA, PARANOID (HCC): Chronic | ICD-10-CM

## 2021-12-18 RX ORDER — ARIPIPRAZOLE 400 MG
KIT INTRAMUSCULAR
Qty: 1 EACH | Refills: 5 | Status: SHIPPED | OUTPATIENT
Start: 2021-12-18 | End: 2022-06-01

## 2022-01-12 ENCOUNTER — TELEPHONE (OUTPATIENT)
Dept: PSYCHIATRY | Facility: CLINIC | Age: 32
End: 2022-01-12

## 2022-01-12 RX ORDER — GABAPENTIN 600 MG/1
600 TABLET ORAL 3 TIMES DAILY
Qty: 90 TABLET | Refills: 3 | Status: SHIPPED | OUTPATIENT
Start: 2022-01-12 | End: 2022-02-21

## 2022-01-12 NOTE — TELEPHONE ENCOUNTER
Pt is having increased anxiety and feels really nervous around everyone and feels shaky.  She is even nervous around her mom and dad and doesn't know why.  I reminded her that valium was a temporary fix, but I didn't know if you wanted to increase her gabapentin or try something else.   Please advise.

## 2022-01-18 ENCOUNTER — HOSPITAL ENCOUNTER (OUTPATIENT)
Dept: CARDIOLOGY | Facility: HOSPITAL | Age: 32
Discharge: HOME | End: 2022-01-18
Admitting: NURSE PRACTITIONER

## 2022-01-18 ENCOUNTER — TRANSCRIBE ORDERS (OUTPATIENT)
Dept: ADMINISTRATIVE | Facility: HOSPITAL | Age: 32
End: 2022-01-18

## 2022-01-18 DIAGNOSIS — M25.473 ANKLE EDEMA: ICD-10-CM

## 2022-01-18 DIAGNOSIS — M79.672 LEFT FOOT PAIN: Primary | ICD-10-CM

## 2022-01-18 DIAGNOSIS — M79.672 LEFT FOOT PAIN: ICD-10-CM

## 2022-01-18 LAB
BH CV LOWER VASCULAR LEFT COMMON FEMORAL AUGMENT: NORMAL
BH CV LOWER VASCULAR LEFT COMMON FEMORAL COMPETENT: NORMAL
BH CV LOWER VASCULAR LEFT COMMON FEMORAL COMPRESS: NORMAL
BH CV LOWER VASCULAR LEFT COMMON FEMORAL PHASIC: NORMAL
BH CV LOWER VASCULAR LEFT COMMON FEMORAL SPONT: NORMAL
BH CV LOWER VASCULAR LEFT DISTAL FEMORAL COMPRESS: NORMAL
BH CV LOWER VASCULAR LEFT GASTRONEMIUS COMPRESS: NORMAL
BH CV LOWER VASCULAR LEFT GREATER SAPH AK COMPRESS: NORMAL
BH CV LOWER VASCULAR LEFT GREATER SAPH BK COMPRESS: NORMAL
BH CV LOWER VASCULAR LEFT LESSER SAPH COMPRESS: NORMAL
BH CV LOWER VASCULAR LEFT MID FEMORAL AUGMENT: NORMAL
BH CV LOWER VASCULAR LEFT MID FEMORAL COMPETENT: NORMAL
BH CV LOWER VASCULAR LEFT MID FEMORAL COMPRESS: NORMAL
BH CV LOWER VASCULAR LEFT MID FEMORAL PHASIC: NORMAL
BH CV LOWER VASCULAR LEFT MID FEMORAL SPONT: NORMAL
BH CV LOWER VASCULAR LEFT PERONEAL COMPRESS: NORMAL
BH CV LOWER VASCULAR LEFT POPLITEAL AUGMENT: NORMAL
BH CV LOWER VASCULAR LEFT POPLITEAL COMPETENT: NORMAL
BH CV LOWER VASCULAR LEFT POPLITEAL COMPRESS: NORMAL
BH CV LOWER VASCULAR LEFT POPLITEAL PHASIC: NORMAL
BH CV LOWER VASCULAR LEFT POPLITEAL SPONT: NORMAL
BH CV LOWER VASCULAR LEFT POSTERIOR TIBIAL COMPRESS: NORMAL
BH CV LOWER VASCULAR LEFT PROXIMAL FEMORAL COMPRESS: NORMAL
BH CV LOWER VASCULAR LEFT SAPHENOFEMORAL JUNCTION COMPRESS: NORMAL
BH CV LOWER VASCULAR RIGHT COMMON FEMORAL AUGMENT: NORMAL
BH CV LOWER VASCULAR RIGHT COMMON FEMORAL COMPETENT: NORMAL
BH CV LOWER VASCULAR RIGHT COMMON FEMORAL COMPRESS: NORMAL
BH CV LOWER VASCULAR RIGHT COMMON FEMORAL PHASIC: NORMAL
BH CV LOWER VASCULAR RIGHT COMMON FEMORAL SPONT: NORMAL

## 2022-01-18 PROCEDURE — 93971 EXTREMITY STUDY: CPT

## 2022-02-06 RX ORDER — OLANZAPINE 10 MG/1
TABLET ORAL
Qty: 30 TABLET | Refills: 2 | Status: SHIPPED | OUTPATIENT
Start: 2022-02-06 | End: 2022-02-21

## 2022-02-10 ENCOUNTER — HOSPITAL ENCOUNTER (EMERGENCY)
Facility: HOSPITAL | Age: 32
Discharge: HOME | End: 2022-02-10
Admitting: EMERGENCY MEDICINE

## 2022-02-10 VITALS
DIASTOLIC BLOOD PRESSURE: 40 MMHG | SYSTOLIC BLOOD PRESSURE: 110 MMHG | BODY MASS INDEX: 31.7 KG/M2 | RESPIRATION RATE: 18 BRPM | HEIGHT: 67 IN | OXYGEN SATURATION: 99 % | WEIGHT: 201.94 LBS | HEART RATE: 88 BPM | TEMPERATURE: 98.1 F

## 2022-02-10 DIAGNOSIS — M54.50 ACUTE BILATERAL LOW BACK PAIN WITHOUT SCIATICA: Primary | ICD-10-CM

## 2022-02-10 LAB
B-HCG UR QL: NEGATIVE
BACTERIA UR QL AUTO: ABNORMAL /HPF
BILIRUB UR QL STRIP: NEGATIVE
CLARITY UR: CLEAR
COLOR UR: YELLOW
GLUCOSE UR STRIP-MCNC: NEGATIVE MG/DL
HGB UR QL STRIP.AUTO: ABNORMAL
HYALINE CASTS UR QL AUTO: ABNORMAL /LPF
KETONES UR QL STRIP: ABNORMAL
LEUKOCYTE ESTERASE UR QL STRIP.AUTO: ABNORMAL
NITRITE UR QL STRIP: NEGATIVE
PH UR STRIP.AUTO: 6.5 [PH] (ref 5–8)
PROT UR QL STRIP: ABNORMAL
RBC # UR STRIP: ABNORMAL /HPF
REF LAB TEST METHOD: ABNORMAL
SP GR UR STRIP: 1.02 (ref 1–1.03)
SQUAMOUS #/AREA URNS HPF: ABNORMAL /HPF
UROBILINOGEN UR QL STRIP: ABNORMAL
WBC # UR STRIP: ABNORMAL /HPF

## 2022-02-10 PROCEDURE — 96372 THER/PROPH/DIAG INJ SC/IM: CPT

## 2022-02-10 PROCEDURE — 25010000002 KETOROLAC TROMETHAMINE PER 15 MG: Performed by: EMERGENCY MEDICINE

## 2022-02-10 PROCEDURE — 81001 URINALYSIS AUTO W/SCOPE: CPT | Performed by: EMERGENCY MEDICINE

## 2022-02-10 PROCEDURE — 99283 EMERGENCY DEPT VISIT LOW MDM: CPT

## 2022-02-10 PROCEDURE — 81025 URINE PREGNANCY TEST: CPT | Performed by: EMERGENCY MEDICINE

## 2022-02-10 RX ORDER — MELOXICAM 15 MG/1
15 TABLET ORAL DAILY
Qty: 10 TABLET | Refills: 0 | Status: SHIPPED | OUTPATIENT
Start: 2022-02-10 | End: 2022-08-23

## 2022-02-10 RX ORDER — KETOROLAC TROMETHAMINE 30 MG/ML
30 INJECTION, SOLUTION INTRAMUSCULAR; INTRAVENOUS ONCE
Status: COMPLETED | OUTPATIENT
Start: 2022-02-10 | End: 2022-02-10

## 2022-02-10 RX ADMIN — KETOROLAC TROMETHAMINE 30 MG: 30 INJECTION, SOLUTION INTRAMUSCULAR; INTRAVENOUS at 14:34

## 2022-02-10 NOTE — ED NOTES
Woke up this morning with low left side back pain. Denies any injuries. Numbness to left lower back and buttocks     Marlyn Garcia RN  02/10/22 6157

## 2022-02-10 NOTE — ED PROVIDER NOTES
Subjective   History of Present Illness  30-year-old female presents with low back pain.  She states that started 2 days ago when she was bending.  She states it is worse with bending.  She noted some feeling of numbness to the left side of her lower back.  She does not have any abdominal pain.  She does not complain of pain numbness or paresthesia into the legs.  She reports no bowel or bladder difficulty.  She had no fall or direct trauma.  Review of Systems  For fever rash bowel or bladder complaints  Past Medical History:   Diagnosis Date   • Abdominal pain, epigastric 06/20/2017   • Borderline personality disorder (HCC)    • Chronic pain syndrome 09/11/2017   • Contraceptive management 01/23/2018   • Dysuria 11/30/2017   • Genital herpes 11/20/2017   • High risk sexual behavior 11/20/2017   • HPV (human papilloma virus) anogenital infection    • Irregular menstruation 11/30/2017    refer to OB/GYN for further management 11-   • Panic disorder    • Poor compliance with medication 02/21/2019   • Positive skin test for tuberculosis     Tuberculosis positive   • Pyelonephritis, acute 06/20/2017    Improved 06-   • Right knee pain 01/23/2018    Discussed symptomatic treatment, activity as tolerate. 01-   • Schizoaffective disorder ([facility])    • Schizophrenia, paranoid ([facility]) 07/14/2016    deteriorated 04-   • Substance abuse (Formerly [facility])    • Trichotillomania 07/14/2016   • Vaginitis 11/20/2017   • Withdrawal symptoms, drug or narcotic (Formerly [facility])        Allergies   Allergen Reactions   • Haldol [Haloperidol] Other (See Comments) and Mental Status Change     [facility]        Past Surgical History:   Procedure Laterality Date   • PAP SMEAR  2017    [facility]       History reviewed. No pertinent family history.    Social History     Socioeconomic History   • Marital status: Single   Tobacco Use   • Smoking status: Current Every Day Smoker     Types:  Cigarettes   • Smokeless tobacco: Never Used   • Tobacco comment: Passive Smoke: Y   Substance and Sexual Activity   • Alcohol use: No   • Drug use: Not Currently     Types: Cocaine(coke), Benzodiazepines, Oxycodone, Hydrocodone, Marijuana   • Sexual activity: Never     Prior to Admission medications    Medication Sig Start Date End Date Taking? Authorizing Provider   Abilify Maintena 400 MG Suspension Reconstituted ER IM injection ER INJECT 1 ML INTRAMUSCULARLY EVERY 28 DAYS AS DIRECTED BY PRESCRIBER 12/18/21   Batool Carter PA-C   Calcium Carbonate-Vitamin D (calcium-vitamin D) 500-200 MG-UNIT tablet per tablet  1/4/21   Martha Pham MD   Cholecalciferol (Vitamin D3) 250 MCG (78921 UT) tablet  1/4/21   Martha Pham MD   diclofenac (VOLTAREN) 75 MG EC tablet Take 1 tablet by mouth 2 (Two) Times a Day. 2/6/21   Baudilio Sandhu DO   gabapentin (Neurontin) 600 MG tablet Take 1 tablet by mouth 3 (Three) Times a Day. 1/12/22 1/12/23  Batool Carter PA-C   medroxyPROGESTERone (DEPO-PROVERA) 150 MG/ML injection  11/30/20   Martha Pham MD   nitrofurantoin, macrocrystal-monohydrate, (MACROBID) 100 MG capsule Take 1 capsule by mouth 2 (Two) Times a Day. 5/15/21   Junior Prajapati MD   OLANZapine (zyPREXA) 10 MG tablet TAKE ONE TABLET BY MOUTH ONCE NIGHTLY 2/6/22   Batool Carter PA-C   ondansetron ODT (ZOFRAN-ODT) 4 MG disintegrating tablet Place 1 tablet on the tongue Every 8 (Eight) Hours As Needed for Nausea or Vomiting. 2/6/21   Baudilio Sandhu DO   terbinafine (lamiSIL) 250 MG tablet  10/1/21   Martha Pham MD           Objective   Physical Exam  31-year-old female awake alert.  Generally overweight.  Examination of back she complains of pain in her lower back.  This is worse with movement.  There is no rash.  She reports some feeling of numbness to the left side of her back where she has a lidocaine patch.  Abdomen is soft nontender she reports normal  "sensation.  Examination of legs she reports normal sensation.  She has 5/5 motor strength.  Reflexes 1+ symmetric.  Procedures           ED Course      Results for orders placed or performed during the hospital encounter of 02/10/22   Pregnancy, Urine - Urine, Clean Catch    Specimen: Urine, Clean Catch   Result Value Ref Range    HCG, Urine QL Negative Negative   Urinalysis With Microscopic If Indicated (No Culture) - Urine, Clean Catch    Specimen: Urine, Clean Catch   Result Value Ref Range    Color, UA Yellow Yellow, Straw    Appearance, UA Clear Clear    pH, UA 6.5 5.0 - 8.0    Specific Gravity, UA 1.020 1.005 - 1.030    Glucose, UA Negative Negative    Ketones, UA 15 mg/dL (1+) (A) Negative    Bilirubin, UA Negative Negative    Blood, UA Moderate (2+) (A) Negative    Protein, UA Trace (A) Negative    Leuk Esterase, UA Trace (A) Negative    Nitrite, UA Negative Negative    Urobilinogen, UA 1.0 E.U./dL 0.2 - 1.0 E.U./dL   Urinalysis, Microscopic Only - Urine, Clean Catch    Specimen: Urine, Clean Catch   Result Value Ref Range    RBC, UA 0-2 (A) None Seen /HPF    WBC, UA 6-12 (A) None Seen /HPF    Bacteria, UA 1+ (A) None Seen /HPF    Squamous Epithelial Cells, UA 7-12 (A) None Seen, 0-2 /HPF    Hyaline Casts, UA 0-2 None Seen /LPF    Methodology Automated Microscopy      No radiology results for the last day  Medications   ketorolac (TORADOL) injection 30 mg (30 mg Intramuscular Given 2/10/22 1434)     /43 (BP Location: Left arm, Patient Position: Sitting)   Pulse 97   Temp 98.6 °F (37 °C) (Oral)   Resp 20   Ht 170.2 cm (67\")   Wt 91.6 kg (201 lb 15.1 oz)   LMP  (LMP Unknown)   SpO2 94%   BMI 31.63 kg/m²                                              MDM  Patient was given injection of Toradol.  Urine hCG was negative.  A voided urinalysis revealed 6-12 white cells 7-12 squamous cells 1+ bacteria nitrite negative.  Repeat evaluation she was sleeping.  She is feeling improved.  Findings were " discussed with her.  She reports that she has had no urinary symptoms that would suggest UTI suspect this is just contamination.  She was discharged.  She was placed on Mobic for pain.  Advised can also use Tylenol.  To follow-up with primary provider return new or worsening symptoms  Final diagnoses:   Acute bilateral low back pain without sciatica       ED Disposition  ED Disposition     ED Disposition Condition Comment    Discharge Stable           Surekha Richards MD  [address] [facility] [place] [address] [phone]    Schedule an appointment as soon as possible for a visit            Medication List      New Prescriptions    meloxicam 15 MG tablet  Commonly known as: Mobic  Take 1 tablet by mouth Daily. Prn pain           Where to Get Your Medications      These medications were sent to [address] [place] - [address] [facility] - [phone]  - [phone] FX  [address] [facility] [place] [address]    Phone: [phone]   · meloxicam 15 MG tablet          Junior Prajapati MD  02/10/22 2260

## 2022-02-10 NOTE — DISCHARGE INSTRUCTIONS
May continue with lidocaine patch to back.  May use Tylenol for pain in addition to meloxicam, follow-up with primary provider for repeat evaluation return new or worsening symptoms

## 2022-02-21 ENCOUNTER — OFFICE VISIT (OUTPATIENT)
Dept: PSYCHIATRY | Facility: CLINIC | Age: 32
End: 2022-02-21

## 2022-02-21 DIAGNOSIS — F41.1 GENERALIZED ANXIETY DISORDER: Chronic | ICD-10-CM

## 2022-02-21 DIAGNOSIS — F63.3 TRICHOTILLOMANIA: Chronic | ICD-10-CM

## 2022-02-21 DIAGNOSIS — F41.0 PANIC DISORDER: Chronic | ICD-10-CM

## 2022-02-21 DIAGNOSIS — F20.0 SCHIZOPHRENIA, PARANOID: Primary | Chronic | ICD-10-CM

## 2022-02-21 PROCEDURE — 99214 OFFICE O/P EST MOD 30 MIN: CPT | Performed by: PHYSICIAN ASSISTANT

## 2022-02-21 RX ORDER — OLANZAPINE 10 MG/1
TABLET ORAL
Qty: 115 TABLET | Refills: 1 | Status: SHIPPED | OUTPATIENT
Start: 2022-02-21 | End: 2022-08-22

## 2022-02-21 RX ORDER — GABAPENTIN 800 MG/1
800 TABLET ORAL 3 TIMES DAILY
Qty: 90 TABLET | Refills: 3 | Status: SHIPPED | OUTPATIENT
Start: 2022-02-21 | End: 2022-05-25 | Stop reason: SDUPTHER

## 2022-03-25 ENCOUNTER — TELEPHONE (OUTPATIENT)
Dept: PSYCHIATRY | Facility: CLINIC | Age: 32
End: 2022-03-25

## 2022-03-25 DIAGNOSIS — F41.0 PANIC DISORDER: ICD-10-CM

## 2022-03-25 DIAGNOSIS — F41.1 GENERALIZED ANXIETY DISORDER: Primary | ICD-10-CM

## 2022-03-25 NOTE — TELEPHONE ENCOUNTER
Pt says she has really bad anxiety.  She only took one gabapentin today.  She said valium really helps her when she gets like this and is asking for only a handful of pills to get through the rest of the month.  Please advise.

## 2022-03-28 RX ORDER — DIAZEPAM 5 MG/1
5 TABLET ORAL DAILY PRN
Qty: 15 TABLET | Refills: 0 | Status: SHIPPED | OUTPATIENT
Start: 2022-03-28 | End: 2022-04-21 | Stop reason: SDUPTHER

## 2022-03-28 NOTE — TELEPHONE ENCOUNTER
I will give her a few Valium, but they are not to be taken every day. I sent an Rx for #15 tabs to [facility]

## 2022-04-06 ENCOUNTER — TELEPHONE (OUTPATIENT)
Dept: PSYCHIATRY | Facility: CLINIC | Age: 32
End: 2022-04-06

## 2022-04-06 NOTE — TELEPHONE ENCOUNTER
PCP doctor wanted pt to call about swelling ankles and feet and she wants to know if Zyprexa or Abilify Injection could be causing swelling.  She says also her liver function is high.  Pt not allowed to have Tylenol or alcohol.  Please advise.

## 2022-04-12 NOTE — TELEPHONE ENCOUNTER
Her PCP needs to work-up the elevated liver tests also.  The Abilify Maintena and the Zyprexa can elevate her cholesterol and sugar level, but not affecting the liver enzymes.  She had a metabolic panel done 1 year ago and her liver functions were normal at that time, so this is just developed.  She may need testing for hepatitis, have ultrasound of the liver etc. by her primary care.

## 2022-04-12 NOTE — TELEPHONE ENCOUNTER
Yes, either of them can cause swelling but more likely the Abilify.  She has been doing really well, I'd hate to stop the injection for risk of hospitalization.  Can he give you something for the edema?  If not, we can discuss more at her follow scheduled in a couple of weeks.

## 2022-04-12 NOTE — TELEPHONE ENCOUNTER
Pt says she will follow up with you at next appt, but she was mostly concerned about the high liver function.  She said she definitely does not want to go back to the hospital.  She will ask her PCP for edema meds.

## 2022-04-21 ENCOUNTER — TELEPHONE (OUTPATIENT)
Dept: PSYCHIATRY | Facility: CLINIC | Age: 32
End: 2022-04-21

## 2022-04-21 DIAGNOSIS — F41.1 GENERALIZED ANXIETY DISORDER: ICD-10-CM

## 2022-04-21 DIAGNOSIS — F41.0 PANIC DISORDER: ICD-10-CM

## 2022-04-21 RX ORDER — DIAZEPAM 5 MG/1
5 TABLET ORAL DAILY PRN
Qty: 15 TABLET | Refills: 0 | Status: SHIPPED | OUTPATIENT
Start: 2022-04-21 | End: 2022-05-09 | Stop reason: SDUPTHER

## 2022-04-21 NOTE — TELEPHONE ENCOUNTER
Pt says she is having a really hard time because her son passed away and she just ran out of valium and is asking for more.

## 2022-04-21 NOTE — TELEPHONE ENCOUNTER
Tell her I am so sorry to hear that!  I have sent one refill of the Valium to her pharmacy.  I ran the [employer] but it wouldn't let me print it. Can you just log on and print it for me?

## 2022-04-22 NOTE — TELEPHONE ENCOUNTER
Informed pt and she said her sister also passed away recently and she is just having a hard time and she says thank you. Please advise.

## 2022-05-01 DIAGNOSIS — F41.0 PANIC DISORDER: ICD-10-CM

## 2022-05-01 DIAGNOSIS — F41.1 GENERALIZED ANXIETY DISORDER: ICD-10-CM

## 2022-05-03 RX ORDER — DIAZEPAM 5 MG/1
TABLET ORAL
Qty: 15 TABLET | OUTPATIENT
Start: 2022-05-03

## 2022-05-05 NOTE — TELEPHONE ENCOUNTER
Pt says she called them in a little early to give you time to fill them.  She says she is out now.  Please fill.  Pt wants a call back to her dad [phone].

## 2022-05-09 RX ORDER — DIAZEPAM 5 MG/1
5 TABLET ORAL DAILY PRN
Qty: 15 TABLET | Refills: 0 | Status: SHIPPED | OUTPATIENT
Start: 2022-05-09 | End: 2022-05-19 | Stop reason: SDUPTHER

## 2022-05-09 NOTE — TELEPHONE ENCOUNTER
Please remind Annmarie that I do not plan to continue writing refills for the Valium.  This will be the last one.

## 2022-05-19 ENCOUNTER — OFFICE VISIT (OUTPATIENT)
Dept: PSYCHIATRY | Facility: CLINIC | Age: 32
End: 2022-05-19

## 2022-05-19 DIAGNOSIS — F41.1 GENERALIZED ANXIETY DISORDER: Chronic | ICD-10-CM

## 2022-05-19 DIAGNOSIS — F41.0 PANIC DISORDER: Chronic | ICD-10-CM

## 2022-05-19 DIAGNOSIS — F63.3 TRICHOTILLOMANIA: Chronic | ICD-10-CM

## 2022-05-19 DIAGNOSIS — F20.0 SCHIZOPHRENIA, PARANOID: Primary | Chronic | ICD-10-CM

## 2022-05-19 PROCEDURE — 99214 OFFICE O/P EST MOD 30 MIN: CPT | Performed by: PHYSICIAN ASSISTANT

## 2022-05-19 RX ORDER — DIAZEPAM 5 MG/1
5 TABLET ORAL DAILY PRN
Qty: 30 TABLET | Refills: 1 | Status: SHIPPED | OUTPATIENT
Start: 2022-05-19 | End: 2022-06-29

## 2022-05-19 NOTE — PROGRESS NOTES
Subjective   Annmarie Perera is a 31 y.o.white female who presents today for follow up in the office    Chief Complaint:  Anxiety, AVH improcved, hair pulling better    History of Present Illness:   She did not tolerate the Caplyta, back on Zyprexa  Keeping her daughter (Jenelle) Monday through Friday while the Dad is at work, so happy to have her  Walking her aunt's dog   Using the Valium every other day to help her sleep and helps her paranoia at night   Her 15 y/o son wants to come live with her but can't, he lives in [place]  Youngest sister is expecting a baby May 28th  Her sister overdosed on morphine, Fentanyl and Xanax, found in the park, had 5 kids, her S/O has the kids (two are teens)  She called CPS to see if she could see her son (born 2015, second child), he was adopted out and she didn't know it, has been upset about it.  She is still not working  She is trying to stay on track because she does not want to go back to [facility]  No recent hospitalizations  Her aunt is giving her the Abilify Maintenna every 28 days, got it on 2/16/22    Her hair is growing out, hair pulling significantly improved.  She was put in [facility] in Sept 2019 and then transferred to [facility] for 11 months, where she got Zyprexa twice daily discharged in Sept 2020 to a Group home in [place] x 3 wks but left and went back home with her Dad  She was on Zyprexa while in the hospital but Salazar (Junior Kasper, NP via phone visit) took her off the Zyprexa and put her on Abilify Maintenna plus the Abilify 30mg tabs and weaning her off the Abilify  Depression 3/10, happy to have her daughter all day during the week  Denies SI/HI  Anxiety 4/10, says Gabapentin helping      The following portions of the patient's history were reviewed and updated as appropriate: allergies, current medications, past family history, past medical history, past social history, past surgical history  and problem list.    PAST PSYCHIATRIC HISTORY  Axis I  Affective/Bipoloar Disorder, Anxiety/Panic Disorder, Cognitive Disorder  Axis II  None,     PAST OUTPATIENT TREATMENT  Diagnosis treated:  Affective Disorder, Anxiety/Panic Disorder, Cognitive Disorder  Treatment Type:  Individual Therapy, Group Therapy, Medication Management  Hospitalized at [facility], Sept 2019  Hospitalized at [facility] 2019 x 11 months  Prior Psychiatric Medications:  Pristiq  Saphris  Lamictal    Lexapro, no emotion  Xanax  risperidone - she did not like how she was feeling so she stopped all of them  Neurontin   Prozac, Zoloft, did not like , no emotions  Abilify  Clonazepam - effective   Vraylar - increased agitation   Seroquel  Trazodone, did not like it  Palperidone caused hands and arms to draw up  Buspar  Zyprexa, hyperprolactinemia  Abilify Maintenna  Caplyta, chest hurt and shakey  Valium  Support Groups:  Narcotics Anonymous (NA)  Sequelae Of Mental Disorder:  suicide attempt, arrest, social isolation, family disruption, financial hardships, emotional distress      Interval History  Improved    Side Effects  None    Past Psych Hx was reviewed and compared to 2/21/22 visit and appropriate updates were made.    Past Medical History:  Past Medical History:   Diagnosis Date   • Abdominal pain, epigastric 06/20/2017   • Borderline personality disorder (HCC)    • Chronic pain syndrome 09/11/2017   • Contraceptive management 01/23/2018   • Dysuria 11/30/2017   • Genital herpes 11/20/2017   • High risk sexual behavior 11/20/2017   • HPV (human papilloma virus) anogenital infection    • Irregular menstruation 11/30/2017    refer to OB/GYN for further management 11-   • Panic disorder    • Poor compliance with medication 02/21/2019   • Positive skin test for tuberculosis     Tuberculosis positive   • Pyelonephritis, acute 06/20/2017    Improved 06-   • Right knee pain 01/23/2018    Discussed symptomatic  treatment, activity as tolerate. 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 No pertinent family history.    Past Surgical History:  Past Surgical History:   Procedure Laterality Date   • PAP SMEAR  2017    [facility]       Problem List:  Patient Active Problem List   Diagnosis   • Abdominal pain, epigastric   • Generalized anxiety disorder   • Chronic pain disorder   • Contraceptive management   • Difficult or painful urination   • Genital herpes   • High risk sexual behavior   • Irregular menstruation   • Knee pain, right   • Poor compliance with medication   • Pyelonephritis, acute   • Vaginitis   • Schizophrenia, paranoid ([facility])   • Trichotillomania   • Tobacco use   • Acute opioid withdrawal ([facility])   • Panic disorder       Allergy:   Allergies   Allergen Reactions   • Haldol [Haloperidol] Other (See Comments) and Mental Status Change     Abstraction from [facility]         Discontinued Medications:  Medications Discontinued During This Encounter   Medication Reason   • diazePAM (Valium) 5 MG tablet Reorder       Current Medications:   Current Outpatient Medications   Medication Sig Dispense Refill   • diazePAM (Valium) 5 MG tablet Take 1 tablet by mouth Daily As Needed for Anxiety. 30 tablet 1   • Abilify Maintena  400 MG Suspension Reconstituted ER IM injection ER INJECT 1 ML INTRAMUSCULARLY EVERY 28 DAYS AS DIRECTED BY PRESCRIBER 1 each 5   • Calcium Carbonate-Vitamin D (calcium-vitamin D) 500-200 MG-UNIT tablet per tablet      • Cholecalciferol (Vitamin D3) 250 MCG (65425 UT) tablet      • clonazePAM (KlonoPIN) 1 MG tablet Take 1 tablet by mouth Daily As Needed for Anxiety. 30 tablet 0   • diclofenac (VOLTAREN) 75 MG EC tablet Take 1 tablet by mouth 2 (Two) Times a Day. 20 tablet 0   • gabapentin (Neurontin) 800 MG tablet Take 1 tablet by mouth 3 (Three) Times a Day. 90 tablet 3   • medroxyPROGESTERone (DEPO-PROVERA) 150 MG/ML injection      • meloxicam (Mobic) 15 MG tablet Take 1 tablet by mouth Daily. Prn pain 10 tablet 0   • nitrofurantoin, macrocrystal-monohydrate, (MACROBID) 100 MG capsule Take 1 capsule by mouth 2 (Two) Times a Day. 10 capsule 0   • OLANZapine (zyPREXA) 10 MG tablet Take 1/2 tab in the AM and a full tab at bedtime. 115 tablet 1   • ondansetron ODT (ZOFRAN-ODT) 4 MG disintegrating tablet Place 1 tablet on the tongue Every 8 (Eight) Hours As Needed for Nausea or Vomiting. 20 tablet 0   • terbinafine (lamiSIL) 250 MG tablet        No current facility-administered medications for this visit.         Review of Symptoms:    Psychiatric/Behavioral: Negative for agitation, behavioral problems, confusion, decreased concentration, dysphoric mood, hallucinations, self-injury, sleep disturbance and suicidal ideas. The patient is nervous/anxious and is not hyperactive.        Physical Exam:   not currently breastfeeding.    Mental Status Exam:   Hygiene:   fair  Cooperation:  Cooperative  Eye Contact:  Good  Psychomotor Behavior:  Appropriate  Affect: Appropriate  Mood: Anxious  Hopelessness: Denies  Speech:  Normal rate and volume  Thought Process:  Goal directed  Thought Content:  Normal  Suicidal:  None  Homicidal:  None  Hallucinations:  Auditory (occasional now)  Delusion:  None currently  Memory:   Deficits  Orientation:  Person, Place, Time and Situation  Reliability:  Fair  Insight:  Fair  Judgement: Fair    Impulse Control:  Fair  Physical/Medical Issues:  No      Mental Status Exam was reviewed and compared to 2/21/22 visit and updates were made.     PHQ-9 Depression Screening  Little interest or pleasure in doing things?     Feeling down, depressed, or hopeless?     Trouble falling or staying asleep, or sleeping too much?     Feeling tired or having little energy?     Poor appetite or overeating?     Feeling bad about yourself - or that you are a failure or have let yourself or your family down?     Trouble concentrating on things, such as reading the newspaper or watching television?     Moving or speaking so slowly that other people could have noticed? Or the opposite - being so fidgety or restless that you have been moving around a lot more than usual?     Thoughts that you would be better off dead, or of hurting yourself in some way?     PHQ-9 Total Score  6   If you checked off any problems, how difficult have these problems made it for you to do your work, take care of things at home, or get along with other people?  Somewhat difficult           Current every day smoker less than 3 minutes spent counseling Not agreeable to stopping    I advised Annmarie of the risks of tobacco use.     Lab Results:   No visits with results within 3 Month(s) from this visit.   Latest known visit with results is:   Admission on 02/10/2022, Discharged on 02/10/2022   Component Date Value Ref Range Status   • HCG, Urine QL 02/10/2022 Negative  Negative Final   • Color, UA 02/10/2022 Yellow  Yellow, Straw Final   • Appearance, UA 02/10/2022 Clear  Clear Final   • pH, UA 02/10/2022 6.5  5.0 - 8.0 Final   • Specific Gravity, UA 02/10/2022 1.020  1.005 - 1.030 Final   • Glucose, UA 02/10/2022 Negative  Negative Final   • Ketones, UA 02/10/2022 15 mg/dL (1+) (A) Negative Final   • Bilirubin, UA 02/10/2022 Negative  Negative  Final   • Blood, UA 02/10/2022 Moderate (2+) (A) Negative Final   • Protein, UA 02/10/2022 Trace (A) Negative Final   • Leuk Esterase, UA 02/10/2022 Trace (A) Negative Final   • Nitrite, UA 02/10/2022 Negative  Negative Final   • Urobilinogen, UA 02/10/2022 1.0 E.U./dL  0.2 - 1.0 E.U./dL Final   • RBC, UA 02/10/2022 0-2 (A) None Seen /HPF Final   • WBC, UA 02/10/2022 6-12 (A) None Seen /HPF Final   • Bacteria, UA 02/10/2022 1+ (A) None Seen /HPF Final   • Squamous Epithelial Cells, UA 02/10/2022 7-12 (A) None Seen, 0-2 /HPF Final   • Hyaline Casts, UA 02/10/2022 0-2  None Seen /LPF Final   • Methodology 02/10/2022 Automated Microscopy   Final       Assessment & Plan   Problems Addressed this Visit        Mental Health    Generalized anxiety disorder (Chronic)    Relevant Medications    diazePAM (Valium) 5 MG tablet    Schizophrenia, paranoid (HCC) - Primary (Chronic)    Relevant Medications    diazePAM (Valium) 5 MG tablet    Trichotillomania (Chronic)    Relevant Medications    diazePAM (Valium) 5 MG tablet    Panic disorder (Chronic)    Relevant Medications    diazePAM (Valium) 5 MG tablet      Diagnoses       Codes Comments    Schizophrenia, paranoid (HCC)    -  Primary ICD-10-CM: F20.0  ICD-9-CM: 295.30     Trichotillomania     ICD-10-CM: F63.3  ICD-9-CM: 312.39     Generalized anxiety disorder     ICD-10-CM: F41.1  ICD-9-CM: 300.02     Panic disorder     ICD-10-CM: F41.0  ICD-9-CM: 300.01           Visit Diagnoses:    ICD-10-CM ICD-9-CM   1. Schizophrenia, paranoid (HCC)  F20.0 295.30   2. Trichotillomania  F63.3 312.39   3. Generalized anxiety disorder  F41.1 300.02   4. Panic disorder  F41.0 300.01       TREATMENT PLAN/GOALS: Continue supportive psychotherapy efforts and medications as indicated. Treatment and medication options discussed during today's visit. Patient ackowledged and verbally consented to continue with current treatment plan and was educated on the importance of compliance with treatment and  follow-up appointments.    MEDICATION ISSUES:  INSPECT reviewed as expected  Discussed medication options and treatment plan of prescribed medication as well as the risks, benefits, and side effects including potential falls, possible impaired driving and metabolic adversities among others. Patient is agreeable to call the office with any worsening of symptoms or onset of side effects. Patient is agreeable to call 911 or go to the nearest ER should he/she begin having SI/HI. No medication side effects or related complaints today.     Patient spent almost a year in [facility], doing all she can to stay on track so she doesn't have to go back  Continue Abilify Maintena 400 mg injection every 28 days  Continue Gabapentin 800 mg 3 times daily for mood stabilizer and anxiety  Continue Olanzapine 10 mg tablets, take 1/2 tab in the a.m. and a full tablet at bedtime  Continue Valium 5 mg nightly prn anxiety/sleep  Check a prolactin level at her next visit.    MEDS ORDERED DURING VISIT:  New Medications Ordered This Visit   Medications   • diazePAM (Valium) 5 MG tablet     Sig: Take 1 tablet by mouth Daily As Needed for Anxiety.     Dispense:  30 tablet     Refill:  1       Return in about 3 months (around 8/19/2022) for video visit.         This document has been electronically signed by Batool Carter PA-C  May 27, 2022 22:39 EDT

## 2022-05-23 ENCOUNTER — TELEPHONE (OUTPATIENT)
Dept: CARDIOLOGY | Facility: CLINIC | Age: 32
End: 2022-05-23

## 2022-05-23 DIAGNOSIS — F41.1 GENERALIZED ANXIETY DISORDER: Primary | Chronic | ICD-10-CM

## 2022-05-23 DIAGNOSIS — F41.0 PANIC DISORDER: Chronic | ICD-10-CM

## 2022-05-23 RX ORDER — CLONAZEPAM 1 MG/1
1 TABLET ORAL DAILY PRN
Qty: 30 TABLET | Refills: 0 | Status: SHIPPED | OUTPATIENT
Start: 2022-05-23 | End: 2022-06-02 | Stop reason: ALTCHOICE
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
## 2022-05-25 RX ORDER — GABAPENTIN 800 MG/1
800 TABLET ORAL 3 TIMES DAILY
Qty: 90 TABLET | Refills: 3 | Status: SHIPPED | OUTPATIENT
Start: 2022-05-25 | End: 2022-09-26

## 2022-05-25 NOTE — TELEPHONE ENCOUNTER
Called pharmacy they are wanting to know if you can send in Gabapentin to them so they can refill it. The pharmacist said insurance denied it because there was no refills she picked up the medication 6 days ago. Please advise.

## 2022-05-30 DIAGNOSIS — F20.0 SCHIZOPHRENIA, PARANOID: Chronic | ICD-10-CM

## 2022-06-01 ENCOUNTER — TELEPHONE (OUTPATIENT)
Dept: PSYCHIATRY | Facility: CLINIC | Age: 32
End: 2022-06-01

## 2022-06-01 RX ORDER — ARIPIPRAZOLE 400 MG
KIT INTRAMUSCULAR
Qty: 1 EACH | Refills: 5 | Status: SHIPPED | OUTPATIENT
Start: 2022-06-01 | End: 2022-11-17

## 2022-06-01 NOTE — TELEPHONE ENCOUNTER
What is the concern?  She should have a refill left but I have not gotten a refill request from her or the pharmacy.  Please check with her pharmacy and see if she needs a refill and I can send it.

## 2022-06-01 NOTE — TELEPHONE ENCOUNTER
Spoke with pharmacy and she has picked up all 6 fills. Her last one was picked up 5/6 given to self 5/7. Next is due 6/4. She will need new Rx sent please. The pharmacy stated they sent a refill request a few days ago

## 2022-06-02 ENCOUNTER — TELEPHONE (OUTPATIENT)
Dept: PSYCHIATRY | Facility: CLINIC | Age: 32
End: 2022-06-02

## 2022-06-02 RX ORDER — HYDROXYZINE PAMOATE 25 MG/1
25 CAPSULE ORAL 3 TIMES DAILY PRN
Qty: 90 CAPSULE | Refills: 0 | Status: SHIPPED | OUTPATIENT
Start: 2022-06-02 | End: 2022-07-21

## 2022-06-02 NOTE — TELEPHONE ENCOUNTER
Patient has the Klonopin she can use as needed for now until it is time to refill the Valium then we will switch back to that.  I also sent an Rx of vistaril to help with her anxiety, she can take it up to three times a day
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
## 2022-06-14 ENCOUNTER — TELEPHONE (OUTPATIENT)
Dept: PSYCHIATRY | Facility: CLINIC | Age: 32
End: 2022-06-14
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
## 2022-06-15 NOTE — TELEPHONE ENCOUNTER
She has a refill on the Valium.  I sent an Rx on 5/19 plus one refill.  I know she is having a hard time right now but I did not want her back on Valium to begin with but I have let her use them as needed, and I don't want her increasing it, even temporarily.

## 2022-06-16 ENCOUNTER — TELEPHONE (OUTPATIENT)
Dept: PSYCHIATRY | Facility: CLINIC | Age: 32
End: 2022-06-16

## 2022-06-16 NOTE — TELEPHONE ENCOUNTER
[facility] CALLED ASKING IF THEY SHOULD GIVE THE DIAZEPAM WHEN SHE PICKED THE CLONAZEPAM 6/2.  I TOLD THEM NO.  ACCORDING TO THE PHARMACY THEY CAN GIVE IT TO HER 6/20.    I CALLED PATIENT AND DETAILED MESSAGE ON HER VM. INFORMED HER IF SH HAD ANY QUESTIONS TO CALL THE OFICE.

## 2022-06-28 ENCOUNTER — TELEPHONE (OUTPATIENT)
Dept: PSYCHIATRY | Facility: CLINIC | Age: 32
End: 2022-06-28

## 2022-06-28 DIAGNOSIS — F41.0 PANIC DISORDER: Chronic | ICD-10-CM

## 2022-06-28 DIAGNOSIS — F41.1 GENERALIZED ANXIETY DISORDER: Chronic | ICD-10-CM

## 2022-06-28 NOTE — TELEPHONE ENCOUNTER
Patient called very distraught. Apparently her father has passed and she has to help her mom with a lot of stuff. She feels like she is having a nervous break down. She has to care for her child right now too and she states that is adding a stressor to her. The baby bonnie is working overtime and cannot help.  She stated she cannot go to [facility] or [place] because of the child and helping her mom.  She is asking for something to help her through all this.

## 2022-06-29 RX ORDER — DIAZEPAM 5 MG/1
5 TABLET ORAL EVERY 12 HOURS PRN
Qty: 60 TABLET | Refills: 0 | Status: SHIPPED | OUTPATIENT
Start: 2022-06-29 | End: 2022-08-01

## 2022-07-21 RX ORDER — HYDROXYZINE PAMOATE 25 MG/1
CAPSULE ORAL
Qty: 90 CAPSULE | Refills: 1 | Status: SHIPPED | OUTPATIENT
Start: 2022-07-21 | End: 2022-10-20 | Stop reason: SDUPTHER

## 2022-08-01 DIAGNOSIS — F41.1 GENERALIZED ANXIETY DISORDER: Chronic | ICD-10-CM

## 2022-08-01 DIAGNOSIS — F41.0 PANIC DISORDER: Chronic | ICD-10-CM

## 2022-08-01 RX ORDER — DIAZEPAM 5 MG/1
TABLET ORAL
Qty: 60 TABLET | Refills: 0 | Status: SHIPPED | OUTPATIENT
Start: 2022-08-01 | End: 2022-08-26 | Stop reason: SDUPTHER

## 2022-08-22 RX ORDER — OLANZAPINE 10 MG/1
TABLET ORAL
Qty: 115 TABLET | Refills: 1 | Status: SHIPPED | OUTPATIENT
Start: 2022-08-22

## 2022-08-23 ENCOUNTER — TELEMEDICINE (OUTPATIENT)
Dept: PSYCHIATRY | Facility: CLINIC | Age: 32
End: 2022-08-23

## 2022-08-23 DIAGNOSIS — F20.0 SCHIZOPHRENIA, PARANOID: Primary | Chronic | ICD-10-CM

## 2022-08-23 DIAGNOSIS — F63.3 TRICHOTILLOMANIA: Chronic | ICD-10-CM

## 2022-08-23 DIAGNOSIS — E22.1 HYPERPROLACTINEMIA: ICD-10-CM

## 2022-08-23 DIAGNOSIS — F41.1 GENERALIZED ANXIETY DISORDER: Chronic | ICD-10-CM

## 2022-08-23 DIAGNOSIS — F41.0 PANIC DISORDER: Chronic | ICD-10-CM

## 2022-08-23 PROCEDURE — 99214 OFFICE O/P EST MOD 30 MIN: CPT | Performed by: PHYSICIAN ASSISTANT
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
## 2022-08-24 ENCOUNTER — TELEPHONE (OUTPATIENT)
Dept: PSYCHIATRY | Facility: CLINIC | Age: 32
End: 2022-08-24

## 2022-08-24 NOTE — TELEPHONE ENCOUNTER
Pt states she needs the letter that was discussed at yesterdays visit emailed to her today if possible.

## 2022-08-26 RX ORDER — DIAZEPAM 5 MG/1
5 TABLET ORAL EVERY 12 HOURS PRN
Qty: 60 TABLET | Refills: 0 | Status: SHIPPED | OUTPATIENT
Start: 2022-08-26 | End: 2022-10-06 | Stop reason: SDUPTHER

## 2022-08-26 NOTE — TELEPHONE ENCOUNTER
Her letter is finished and emailed to you.  She may need to sign and VARINDER.  Also, I forgot to tell her that when she comes to [place] to get her medication from the pharmacy, she needs to go by [facility] and have a prolactin level drawn to make sure it is normal since she has had issues with it elevated in the past.

## 2022-08-29 ENCOUNTER — TELEPHONE (OUTPATIENT)
Dept: PSYCHIATRY | Facility: CLINIC | Age: 32
End: 2022-08-29

## 2022-08-29 ENCOUNTER — LAB (OUTPATIENT)
Dept: LAB | Facility: HOSPITAL | Age: 32
End: 2022-08-29

## 2022-08-29 ENCOUNTER — PRIOR AUTHORIZATION (OUTPATIENT)
Dept: PSYCHIATRY | Facility: CLINIC | Age: 32
End: 2022-08-29

## 2022-08-29 DIAGNOSIS — F20.0 SCHIZOPHRENIA, PARANOID: Chronic | ICD-10-CM

## 2022-08-29 DIAGNOSIS — E22.1 HYPERPROLACTINEMIA: ICD-10-CM

## 2022-08-29 PROCEDURE — 36415 COLL VENOUS BLD VENIPUNCTURE: CPT

## 2022-08-29 PROCEDURE — 84146 ASSAY OF PROLACTIN: CPT

## 2022-08-29 NOTE — TELEPHONE ENCOUNTER
Patient was made aware during our visit of the new Medicaid guidelines and she will have to pay out of pocket if she wants the Rx.

## 2022-08-30 ENCOUNTER — TELEPHONE (OUTPATIENT)
Dept: PSYCHIATRY | Facility: CLINIC | Age: 32
End: 2022-08-30

## 2022-08-30 LAB — PROLACTIN SERPL-MCNC: 19.5 NG/ML (ref 4.79–23.3)

## 2022-08-30 NOTE — TELEPHONE ENCOUNTER
Per Provider: Please let Annmarie know that her prolactin level was normal.    Tried to call pt and the phone wasn't accepting calls at this time.    Sent pt a [employer] message to have the pt call the office.

## 2022-09-12 ENCOUNTER — TELEPHONE (OUTPATIENT)
Dept: PSYCHIATRY | Facility: CLINIC | Age: 32
End: 2022-09-12

## 2022-09-12 NOTE — TELEPHONE ENCOUNTER
Patient called in reference to prescription for valium, when her dad  in May her dose was increased to 2X a day with instructions that dosage would decrease after acute grieving period.    Patient is now having problems while living with her perla father and him being difficult, she will be moving to her own apartment soon but she needs to stay at higher dose for one more month (2X per day)    (Patient states she knows she cannot continue the dose much longer)     Medication was sent in 22 5mg #60 1 every 12 hours.    Please Advise?    Thank You

## 2022-09-13 NOTE — TELEPHONE ENCOUNTER
If it was just filled on 8/26, then the #60 will last until the end of this month, two weeks away.  I'm not going to presumptively make that decision this far in advance. She needs to wait and see how things are going please.

## 2022-09-19 RX ORDER — GABAPENTIN 800 MG/1
800 TABLET ORAL 3 TIMES DAILY
Qty: 90 TABLET | Refills: 3 | OUTPATIENT
Start: 2022-09-19 | End: 2023-09-19

## 2022-09-19 NOTE — TELEPHONE ENCOUNTER
Rx Refill Note  Requested Prescriptions     Refused Prescriptions Disp Refills   • gabapentin (NEURONTIN) 800 MG tablet [Pharmacy Med Name: GABAPENTIN 800 MG TABLET] 90 tablet 3     Sig: Take 1 tablet by mouth 3 (Three) Times a Day.      Last office visit with prescribing clinician: 5/19/2022      Next office visit with prescribing clinician: 10/18/2022            Sandra Felix MA  09/19/22, 11:52 EDT

## 2022-09-26 RX ORDER — GABAPENTIN 800 MG/1
800 TABLET ORAL 3 TIMES DAILY
Qty: 90 TABLET | Refills: 3 | Status: SHIPPED | OUTPATIENT
Start: 2022-09-26 | End: 2022-10-20 | Stop reason: SDUPTHER

## 2022-10-06 DIAGNOSIS — F41.0 PANIC DISORDER: Chronic | ICD-10-CM

## 2022-10-06 DIAGNOSIS — F41.1 GENERALIZED ANXIETY DISORDER: Chronic | ICD-10-CM

## 2022-10-06 RX ORDER — DIAZEPAM 5 MG/1
5 TABLET ORAL EVERY 12 HOURS PRN
Qty: 60 TABLET | Refills: 0 | Status: SHIPPED | OUTPATIENT
Start: 2022-10-06 | End: 2022-10-20 | Stop reason: SDUPTHER

## 2022-10-20 ENCOUNTER — TELEMEDICINE (OUTPATIENT)
Dept: PSYCHIATRY | Facility: CLINIC | Age: 32
End: 2022-10-20

## 2022-10-20 DIAGNOSIS — F41.0 PANIC DISORDER: Chronic | ICD-10-CM

## 2022-10-20 DIAGNOSIS — F41.1 GENERALIZED ANXIETY DISORDER: Chronic | ICD-10-CM

## 2022-10-20 DIAGNOSIS — F20.0 SCHIZOPHRENIA, PARANOID: Primary | Chronic | ICD-10-CM

## 2022-10-20 DIAGNOSIS — F63.3 TRICHOTILLOMANIA: Chronic | ICD-10-CM

## 2022-10-20 PROCEDURE — 99214 OFFICE O/P EST MOD 30 MIN: CPT | Performed by: PHYSICIAN ASSISTANT

## 2022-10-20 RX ORDER — DIAZEPAM 5 MG/1
5 TABLET ORAL EVERY 12 HOURS PRN
Qty: 60 TABLET | Refills: 2 | Status: SHIPPED | OUTPATIENT
Start: 2022-11-03 | End: 2022-10-27 | Stop reason: SDUPTHER

## 2022-10-20 RX ORDER — GABAPENTIN 800 MG/1
800 TABLET ORAL 3 TIMES DAILY
Qty: 90 TABLET | Refills: 3 | Status: SHIPPED | OUTPATIENT
Start: 2022-10-20 | End: 2023-01-23

## 2022-10-20 RX ORDER — HYDROXYZINE PAMOATE 25 MG/1
25 CAPSULE ORAL 3 TIMES DAILY PRN
Qty: 90 CAPSULE | Refills: 5 | Status: SHIPPED | OUTPATIENT
Start: 2022-10-20
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
## 2022-10-24 ENCOUNTER — PRIOR AUTHORIZATION (OUTPATIENT)
Dept: PSYCHIATRY | Facility: CLINIC | Age: 32
End: 2022-10-24

## 2022-10-24 NOTE — TELEPHONE ENCOUNTER
Pa was approved , Patient was notified and also spoke with Yuliana at the Pharmacy and it went through on pharmacy end .

## 2022-10-27 DIAGNOSIS — F41.1 GENERALIZED ANXIETY DISORDER: Chronic | ICD-10-CM

## 2022-10-27 DIAGNOSIS — F41.0 PANIC DISORDER: Chronic | ICD-10-CM

## 2022-10-27 RX ORDER — DIAZEPAM 5 MG/1
5 TABLET ORAL EVERY 12 HOURS PRN
Qty: 60 TABLET | Refills: 2 | Status: SHIPPED | OUTPATIENT
Start: 2022-10-27 | End: 2023-01-23

## 2022-11-01 ENCOUNTER — TELEPHONE (OUTPATIENT)
Dept: PSYCHIATRY | Facility: CLINIC | Age: 32
End: 2022-11-01

## 2022-11-01 NOTE — TELEPHONE ENCOUNTER
I am sorry to hear that, and I understand she is struggling but with her hx of substance abuse, I am not going to increase the Valium.  She gets two per day if needed.  She can take both at bedtime for sleep if she wants but then she will not have any during the day.  She also has the hyrdoxyzine Rx to take TID prn anxiety also. I can send an Rx for Elavil at night for sleep if she would like to try it. We can also use Prazosin for the nightmares.

## 2022-11-01 NOTE — TELEPHONE ENCOUNTER
Pt states she wanted to also tell the provider that her babies father is on a ventilator in ICU and she is having really bad anxiety. Pt states she can't sleep.
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
## 2022-11-02 RX ORDER — AMITRIPTYLINE HYDROCHLORIDE 25 MG/1
TABLET, FILM COATED ORAL
Qty: 60 TABLET | Refills: 2 | Status: SHIPPED | OUTPATIENT
Start: 2022-11-02

## 2022-11-02 RX ORDER — PRAZOSIN HYDROCHLORIDE 1 MG/1
1 CAPSULE ORAL NIGHTLY
Qty: 30 CAPSULE | Refills: 2 | Status: SHIPPED | OUTPATIENT
Start: 2022-11-02 | End: 2023-02-08

## 2022-11-17 DIAGNOSIS — F20.0 SCHIZOPHRENIA, PARANOID: Chronic | ICD-10-CM

## 2022-11-17 RX ORDER — ARIPIPRAZOLE 400 MG
KIT INTRAMUSCULAR
Qty: 1 EACH | Refills: 5 | Status: SHIPPED | OUTPATIENT
Start: 2022-11-17

## 2022-11-28 ENCOUNTER — PRIOR AUTHORIZATION (OUTPATIENT)
Dept: PSYCHIATRY | Facility: CLINIC | Age: 32
End: 2022-11-28

## 2022-12-22 ENCOUNTER — TELEMEDICINE (OUTPATIENT)
Dept: PSYCHIATRY | Facility: CLINIC | Age: 32
End: 2022-12-22

## 2022-12-22 DIAGNOSIS — F63.3 TRICHOTILLOMANIA: Chronic | ICD-10-CM

## 2022-12-22 DIAGNOSIS — F41.0 PANIC DISORDER: Chronic | ICD-10-CM

## 2022-12-22 DIAGNOSIS — F41.1 GENERALIZED ANXIETY DISORDER: Chronic | ICD-10-CM

## 2022-12-22 DIAGNOSIS — F20.0 SCHIZOPHRENIA, PARANOID: Primary | Chronic | ICD-10-CM

## 2022-12-22 PROCEDURE — 99214 OFFICE O/P EST MOD 30 MIN: CPT | Performed by: PHYSICIAN ASSISTANT
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
## 2023-01-18 ENCOUNTER — PRIOR AUTHORIZATION (OUTPATIENT)
Dept: PSYCHIATRY | Facility: CLINIC | Age: 33
End: 2023-01-18
Payer: MEDICAID

## 2023-01-23 DIAGNOSIS — F41.0 PANIC DISORDER: Chronic | ICD-10-CM

## 2023-01-23 DIAGNOSIS — F41.0 PANIC DISORDER: Primary | ICD-10-CM

## 2023-01-23 DIAGNOSIS — F20.0 SCHIZOPHRENIA, PARANOID: Chronic | ICD-10-CM

## 2023-01-23 DIAGNOSIS — F41.1 GENERALIZED ANXIETY DISORDER: Chronic | ICD-10-CM

## 2023-01-23 DIAGNOSIS — F41.1 GENERALIZED ANXIETY DISORDER: ICD-10-CM

## 2023-01-23 RX ORDER — GABAPENTIN 800 MG/1
800 TABLET ORAL 3 TIMES DAILY
Qty: 90 TABLET | Refills: 3 | Status: CANCELLED | OUTPATIENT
Start: 2023-01-23 | End: 2024-01-23

## 2023-01-23 RX ORDER — DIAZEPAM 5 MG/1
5 TABLET ORAL EVERY 12 HOURS PRN
Qty: 60 TABLET | Refills: 2 | Status: CANCELLED | OUTPATIENT
Start: 2023-01-23

## 2023-01-23 RX ORDER — GABAPENTIN 800 MG/1
800 TABLET ORAL 3 TIMES DAILY
Qty: 270 TABLET | Refills: 1 | Status: SHIPPED | OUTPATIENT
Start: 2023-01-23 | End: 2023-01-24 | Stop reason: SDUPTHER

## 2023-01-23 RX ORDER — DIAZEPAM 5 MG/1
5 TABLET ORAL EVERY 12 HOURS PRN
Qty: 60 TABLET | Refills: 2 | Status: SHIPPED | OUTPATIENT
Start: 2023-01-23 | End: 2023-01-24 | Stop reason: SDUPTHER

## 2023-01-24 DIAGNOSIS — F41.1 GENERALIZED ANXIETY DISORDER: ICD-10-CM

## 2023-01-24 DIAGNOSIS — F41.0 PANIC DISORDER: ICD-10-CM

## 2023-01-24 RX ORDER — GABAPENTIN 800 MG/1
800 TABLET ORAL 3 TIMES DAILY
Qty: 270 TABLET | Refills: 1 | Status: CANCELLED | OUTPATIENT
Start: 2023-01-24 | End: 2024-01-24

## 2023-01-24 RX ORDER — DIAZEPAM 5 MG/1
5 TABLET ORAL EVERY 12 HOURS PRN
Qty: 60 TABLET | Refills: 2 | Status: SHIPPED | OUTPATIENT
Start: 2023-01-24 | End: 2024-01-24

## 2023-01-24 RX ORDER — GABAPENTIN 800 MG/1
800 TABLET ORAL 3 TIMES DAILY
Qty: 270 TABLET | Refills: 1 | Status: SHIPPED | OUTPATIENT
Start: 2023-01-24 | End: 2024-01-24

## 2023-01-24 RX ORDER — DIAZEPAM 5 MG/1
5 TABLET ORAL EVERY 12 HOURS PRN
Qty: 60 TABLET | Refills: 2 | Status: CANCELLED | OUTPATIENT
Start: 2023-01-24 | End: 2024-01-24

## 2023-01-24 NOTE — TELEPHONE ENCOUNTER
New Rx for Valium and Gabapentin sent to [facility] as she requested

## 2023-01-24 NOTE — TELEPHONE ENCOUNTER
Her prescription for gabapentin and diazepam were sent again to my her pharmacy as requested.  Thank you for canceling the other prescriptions at [facility].

## 2023-01-24 NOTE — TELEPHONE ENCOUNTER
Patient would like medications to be sent to [facility] due to them being cheaper.  Prescriptions have been cancelled at [facility].  Please advise.

## 2023-02-08 RX ORDER — PRAZOSIN HYDROCHLORIDE 1 MG/1
CAPSULE ORAL
Qty: 90 CAPSULE | Refills: 1 | Status: SHIPPED | OUTPATIENT
Start: 2023-02-08

## 2023-02-13 ENCOUNTER — TELEPHONE (OUTPATIENT)
Dept: PSYCHIATRY | Facility: CLINIC | Age: 33
End: 2023-02-13
Payer: MEDICAID

## 2023-02-13 NOTE — TELEPHONE ENCOUNTER
Patient Called She Has Been Chain Smoking Badly And Wonders If You Can Write Her A Prescription For Something To Help Her Stop?        Please Advise?      Thank You

## 2023-02-22 ENCOUNTER — TELEMEDICINE (OUTPATIENT)
Dept: PSYCHIATRY | Facility: CLINIC | Age: 33
End: 2023-02-22
Payer: MEDICAID

## 2023-02-22 DIAGNOSIS — F20.0 SCHIZOPHRENIA, PARANOID: Primary | Chronic | ICD-10-CM

## 2023-02-22 DIAGNOSIS — F63.3 TRICHOTILLOMANIA: Chronic | ICD-10-CM

## 2023-02-22 DIAGNOSIS — F41.1 GENERALIZED ANXIETY DISORDER: Chronic | ICD-10-CM

## 2023-02-22 DIAGNOSIS — F41.0 PANIC DISORDER: Chronic | ICD-10-CM

## 2023-02-22 PROCEDURE — 99214 OFFICE O/P EST MOD 30 MIN: CPT | Performed by: PHYSICIAN ASSISTANT
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
## 2023-04-13 ENCOUNTER — PRIOR AUTHORIZATION (OUTPATIENT)
Dept: PSYCHIATRY | Facility: CLINIC | Age: 33
End: 2023-04-13
Payer: MEDICAID

## 2023-04-13 ENCOUNTER — TELEMEDICINE (OUTPATIENT)
Dept: PSYCHIATRY | Facility: CLINIC | Age: 33
End: 2023-04-13
Payer: MEDICAID

## 2023-04-13 DIAGNOSIS — F20.0 SCHIZOPHRENIA, PARANOID: Chronic | ICD-10-CM

## 2023-04-13 DIAGNOSIS — F63.3 TRICHOTILLOMANIA: Chronic | ICD-10-CM

## 2023-04-13 DIAGNOSIS — F41.0 PANIC DISORDER: Chronic | ICD-10-CM

## 2023-04-13 DIAGNOSIS — F41.1 GENERALIZED ANXIETY DISORDER: Primary | Chronic | ICD-10-CM

## 2023-04-13 PROCEDURE — 99214 OFFICE O/P EST MOD 30 MIN: CPT | Performed by: PHYSICIAN ASSISTANT

## 2023-04-13 PROCEDURE — 1160F RVW MEDS BY RX/DR IN RCRD: CPT | Performed by: PHYSICIAN ASSISTANT

## 2023-04-13 PROCEDURE — 1159F MED LIST DOCD IN RCRD: CPT | Performed by: PHYSICIAN ASSISTANT

## 2023-04-13 RX ORDER — OLANZAPINE 10 MG/1
TABLET ORAL
Qty: 135 TABLET | Refills: 1 | Status: SHIPPED | OUTPATIENT
Start: 2023-04-13

## 2023-04-13 RX ORDER — OLANZAPINE 10 MG/1
TABLET ORAL
Qty: 115 TABLET | Refills: 1 | Status: SHIPPED | OUTPATIENT
Start: 2023-04-13 | End: 2023-04-13 | Stop reason: SDUPTHER
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
## 2023-04-22 DIAGNOSIS — F41.1 GENERALIZED ANXIETY DISORDER: ICD-10-CM

## 2023-04-22 DIAGNOSIS — F41.0 PANIC DISORDER: ICD-10-CM

## 2023-04-24 DIAGNOSIS — F41.1 GENERALIZED ANXIETY DISORDER: ICD-10-CM

## 2023-04-24 DIAGNOSIS — F41.0 PANIC DISORDER: ICD-10-CM

## 2023-04-24 RX ORDER — DIAZEPAM 5 MG/1
5 TABLET ORAL EVERY 12 HOURS PRN
Qty: 60 TABLET | Refills: 2 | OUTPATIENT
Start: 2023-04-24 | End: 2024-04-23

## 2023-04-24 RX ORDER — DIAZEPAM 5 MG/1
TABLET ORAL
Qty: 60 TABLET | Refills: 2 | Status: SHIPPED | OUTPATIENT
Start: 2023-04-24

## 2023-05-01 RX ORDER — ARIPIPRAZOLE 400 MG
KIT INTRAMUSCULAR
Qty: 1 EACH | OUTPATIENT
Start: 2023-05-01

## 2023-05-01 RX ORDER — ARIPIPRAZOLE 400 MG
KIT INTRAMUSCULAR
Qty: 1 EACH | Refills: 5 | Status: SHIPPED | OUTPATIENT
Start: 2023-05-01

## 2023-05-05 ENCOUNTER — TELEPHONE (OUTPATIENT)
Dept: PSYCHIATRY | Facility: CLINIC | Age: 33
End: 2023-05-05
Payer: MEDICAID

## 2023-05-05 NOTE — TELEPHONE ENCOUNTER
Pt wanted to make provider aware that she is on Suboxone now and that the clinic stated she should be ok to take the psychiatric medication that her provider has her on.

## 2023-07-25 ENCOUNTER — TELEPHONE (OUTPATIENT)
Dept: PSYCHIATRY | Facility: CLINIC | Age: 33
End: 2023-07-25
Payer: MEDICAID

## 2023-07-25 NOTE — TELEPHONE ENCOUNTER
Pt states that she is going to Suboxone clinic and Pt states that Nicole Lucero is wanting to do therapy to stabilize schizophrenia. Nicole works at [facility] [employer].  Pt wanted to ask Provider if that is ok.

## 2023-08-10 ENCOUNTER — TELEPHONE (OUTPATIENT)
Dept: PSYCHIATRY | Facility: CLINIC | Age: 33
End: 2023-08-10
Payer: MEDICAID

## 2023-08-10 NOTE — TELEPHONE ENCOUNTER
Pt called stating she had stopped going to the suboxone clinic also has stopped taking the suboxone a week ago .Pt is having severe withdraws and hearing voices,diarrhea.Pt requested for a increase on the valium. (Batool stated She needs to go somewhere to treat the w/d sxs and get her through the detox while she is observed. do it the right way. [facility] in [facility] near where she is living or there is [facility] [place] does detox. I would recommend she go.) I called the patient back to relay the message from Batool to go to detox patient stated that she is not going to the hospital and hung up. I called the patient back to try to talk to her about going to detox pt once again stated she is not going to the hospital again and hung up.

## 2023-08-10 NOTE — PROGRESS NOTES
Due to patient's hx of addiction, I do not want to increase the valium and would prefer that her withdrawal be managed somewhere she can be closely monitored for the medication she is taking.

## 2023-08-22 ENCOUNTER — TELEPHONE (OUTPATIENT)
Dept: PSYCHIATRY | Facility: CLINIC | Age: 33
End: 2023-08-22
Payer: MEDICAID

## 2023-08-22 NOTE — TELEPHONE ENCOUNTER
Pt states that last time she called she was hearing voices bad, But pt is doing better now. Pt states her mother thinks that she needs another medication other than Zyprexa because it isn't working any longer. Pt is having bad anxiety over her father's death she isn't dealing with it well. Pt would like to know if the provider would give her xanax instead of the the valium because the valium isn't working any more for her anxiety. Pt states her mom is not doing well and the pt is having to take care of her mom and pt states she can't handle much more.     Please advise

## 2023-08-22 NOTE — TELEPHONE ENCOUNTER
Patient is calling back in stating that she is in need of different medication and is asking the Provider to advise.
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
## 2023-08-22 NOTE — TELEPHONE ENCOUNTER
Left a message for Patient to contact us to confirm a 1pm appointment for tomorrow . Also sent a eToro message

## 2023-08-23 ENCOUNTER — TELEMEDICINE (OUTPATIENT)
Dept: PSYCHIATRY | Facility: CLINIC | Age: 33
End: 2023-08-23
Payer: MEDICAID

## 2023-08-23 ENCOUNTER — TELEPHONE (OUTPATIENT)
Dept: PSYCHIATRY | Facility: CLINIC | Age: 33
End: 2023-08-23
Payer: MEDICAID

## 2023-08-23 DIAGNOSIS — F41.0 PANIC DISORDER: Primary | ICD-10-CM

## 2023-08-23 DIAGNOSIS — F41.1 GENERALIZED ANXIETY DISORDER: Chronic | ICD-10-CM

## 2023-08-23 DIAGNOSIS — F63.3 TRICHOTILLOMANIA: Chronic | ICD-10-CM

## 2023-08-23 DIAGNOSIS — F20.0 SCHIZOPHRENIA, PARANOID: Chronic | ICD-10-CM

## 2023-08-23 RX ORDER — OLANZAPINE 10 MG/1
10 TABLET ORAL 2 TIMES DAILY
Qty: 180 TABLET | Refills: 1 | Status: SHIPPED | OUTPATIENT
Start: 2023-08-23

## 2023-08-23 RX ORDER — CLONAZEPAM 1 MG/1
1 TABLET ORAL 3 TIMES DAILY PRN
Qty: 90 TABLET | Refills: 0 | Status: SHIPPED | OUTPATIENT
Start: 2023-08-23 | End: 2024-08-22

## 2023-08-23 NOTE — TELEPHONE ENCOUNTER
----- Message from Batool Carter PA-C sent at 8/23/2023  5:06 PM EDT -----  Please cancel the remaining refills of Valium at her pharmacy and let them know we are switching to Klonopin
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
## 2023-08-24 ENCOUNTER — TELEPHONE (OUTPATIENT)
Dept: PSYCHIATRY | Facility: CLINIC | Age: 33
End: 2023-08-24
Payer: MEDICAID

## 2023-08-24 RX ORDER — LAMOTRIGINE 25 MG/1
25 TABLET ORAL 2 TIMES DAILY
Qty: 60 TABLET | Refills: 2 | Status: SHIPPED | OUTPATIENT
Start: 2023-08-24 | End: 2024-08-23

## 2023-08-24 NOTE — TELEPHONE ENCOUNTER
Patient called - she went to  medications and Lamictal was not called in as she said you discussed starting her on at her appointment. She said he temper is out of control and she needs it as soon as possible.      Please Advise?        Thank You

## 2023-09-07 ENCOUNTER — TELEPHONE (OUTPATIENT)
Dept: PSYCHIATRY | Facility: CLINIC | Age: 33
End: 2023-09-07
Payer: MEDICAID

## 2023-09-07 NOTE — TELEPHONE ENCOUNTER
Patient calling in stating that Pharmacy did not receive script for Lamictal.  Patient was advised that script was sent in to [facility] on 8/24/23.

## 2023-09-15 ENCOUNTER — PRIOR AUTHORIZATION (OUTPATIENT)
Dept: PSYCHIATRY | Facility: CLINIC | Age: 33
End: 2023-09-15
Payer: MEDICAID

## 2023-09-15 DIAGNOSIS — F41.1 GENERALIZED ANXIETY DISORDER: Chronic | ICD-10-CM

## 2023-09-15 DIAGNOSIS — F41.0 PANIC DISORDER: ICD-10-CM

## 2023-09-15 RX ORDER — CLONAZEPAM 1 MG/1
1 TABLET ORAL 3 TIMES DAILY PRN
Qty: 90 TABLET | Refills: 0 | Status: CANCELLED | OUTPATIENT
Start: 2023-09-15 | End: 2024-09-14

## 2023-09-15 RX ORDER — OLANZAPINE 10 MG/1
10 TABLET ORAL 2 TIMES DAILY
Qty: 180 TABLET | Refills: 1 | Status: SHIPPED | OUTPATIENT
Start: 2023-09-15

## 2023-09-15 RX ORDER — CLONAZEPAM 1 MG/1
1 TABLET ORAL 3 TIMES DAILY PRN
Qty: 90 TABLET | Refills: 0 | Status: SHIPPED | OUTPATIENT
Start: 2023-09-15 | End: 2023-09-18 | Stop reason: SDUPTHER

## 2023-09-18 ENCOUNTER — TELEPHONE (OUTPATIENT)
Dept: PSYCHIATRY | Facility: CLINIC | Age: 33
End: 2023-09-18
Payer: MEDICAID

## 2023-09-18 DIAGNOSIS — F41.0 PANIC DISORDER: ICD-10-CM

## 2023-09-18 DIAGNOSIS — F41.1 GENERALIZED ANXIETY DISORDER: Chronic | ICD-10-CM

## 2023-09-18 RX ORDER — CLONAZEPAM 1 MG/1
1 TABLET ORAL 3 TIMES DAILY PRN
Qty: 90 TABLET | Refills: 0 | Status: SHIPPED | OUTPATIENT
Start: 2023-09-18 | End: 2024-09-17

## 2023-09-18 NOTE — TELEPHONE ENCOUNTER
Pt called requesting for the clonazepam sent to the [facility]. Called [facility] cancel script.Provider out office

## 2023-10-17 ENCOUNTER — TELEMEDICINE (OUTPATIENT)
Dept: PSYCHIATRY | Facility: CLINIC | Age: 33
End: 2023-10-17
Payer: MEDICAID

## 2023-10-17 DIAGNOSIS — F63.3 TRICHOTILLOMANIA: Chronic | ICD-10-CM

## 2023-10-17 DIAGNOSIS — F41.1 GENERALIZED ANXIETY DISORDER: Chronic | ICD-10-CM

## 2023-10-17 DIAGNOSIS — F41.0 PANIC DISORDER: ICD-10-CM

## 2023-10-17 DIAGNOSIS — F20.0 SCHIZOPHRENIA, PARANOID: Primary | Chronic | ICD-10-CM

## 2023-10-17 RX ORDER — CLONAZEPAM 1 MG/1
1 TABLET ORAL 3 TIMES DAILY PRN
Qty: 90 TABLET | Refills: 0 | Status: SHIPPED | OUTPATIENT
Start: 2023-10-17 | End: 2024-10-16

## 2023-10-23 ENCOUNTER — TELEPHONE (OUTPATIENT)
Dept: PSYCHIATRY | Facility: CLINIC | Age: 33
End: 2023-10-23
Payer: MEDICAID

## 2023-10-23 NOTE — TELEPHONE ENCOUNTER
Patient did not answer the call.  Left a voicemail to make patient aware of the lost or stolen medication policy.  Chitra has also attached in her note about patient's Lamictal.  Please advise.
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
## 2023-10-24 RX ORDER — HYDROXYZINE PAMOATE 25 MG/1
25 CAPSULE ORAL 3 TIMES DAILY PRN
Qty: 90 CAPSULE | Refills: 0 | Status: SHIPPED | OUTPATIENT
Start: 2023-10-24

## 2023-10-24 NOTE — TELEPHONE ENCOUNTER
Pt called and stated they know the policy about lost or stolen meds. Pt has called wondering if there is anything else that could be called in to help her manage until she can get her next refill. Please advise.

## 2023-11-15 DIAGNOSIS — F41.1 GENERALIZED ANXIETY DISORDER: Chronic | ICD-10-CM

## 2023-11-15 DIAGNOSIS — F41.0 PANIC DISORDER: ICD-10-CM

## 2023-11-15 RX ORDER — CLONAZEPAM 1 MG/1
1 TABLET ORAL 3 TIMES DAILY PRN
Qty: 90 TABLET | Refills: 0 | Status: SHIPPED | OUTPATIENT
Start: 2023-11-15 | End: 2023-11-15 | Stop reason: SDUPTHER

## 2023-11-15 RX ORDER — TOPIRAMATE 25 MG/1
25 TABLET ORAL 2 TIMES DAILY
Qty: 60 TABLET | Refills: 2 | Status: SHIPPED | OUTPATIENT
Start: 2023-11-15 | End: 2023-11-15 | Stop reason: SDUPTHER

## 2023-11-15 RX ORDER — CLONAZEPAM 1 MG/1
1 TABLET ORAL 3 TIMES DAILY PRN
Qty: 90 TABLET | Refills: 0 | Status: SHIPPED | OUTPATIENT
Start: 2023-11-15 | End: 2023-11-16 | Stop reason: SDUPTHER

## 2023-11-15 RX ORDER — TOPIRAMATE 25 MG/1
25 TABLET ORAL 2 TIMES DAILY
Qty: 60 TABLET | Refills: 2 | Status: SHIPPED | OUTPATIENT
Start: 2023-11-15 | End: 2023-11-16 | Stop reason: SDUPTHER

## 2023-11-15 NOTE — TELEPHONE ENCOUNTER
Pt called and stated they need their ClonazePAM refilled. Pt also stated that the Lamictal is making them use the restroom on theirself, so they have stopped taking it. Pt also wants to know if a mood stabilizer can be sent in. Please advise

## 2023-11-15 NOTE — PROGRESS NOTES
I have d/c the Lamictal from her list and sent a new rx for Topamax 25 mg BID for mood stabilizer instead

## 2023-11-16 DIAGNOSIS — F41.1 GENERALIZED ANXIETY DISORDER: Chronic | ICD-10-CM

## 2023-11-16 DIAGNOSIS — F41.0 PANIC DISORDER: ICD-10-CM

## 2023-11-16 RX ORDER — TOPIRAMATE 25 MG/1
25 TABLET ORAL 2 TIMES DAILY
Qty: 60 TABLET | Refills: 2 | Status: SHIPPED | OUTPATIENT
Start: 2023-11-16 | End: 2024-11-15

## 2023-11-16 RX ORDER — CLONAZEPAM 1 MG/1
1 TABLET ORAL 3 TIMES DAILY PRN
Qty: 90 TABLET | Refills: 0 | Status: SHIPPED | OUTPATIENT
Start: 2023-11-16 | End: 2024-11-15

## 2023-11-22 ENCOUNTER — TELEMEDICINE (OUTPATIENT)
Dept: PSYCHIATRY | Facility: CLINIC | Age: 33
End: 2023-11-22
Payer: MEDICAID

## 2023-11-22 DIAGNOSIS — F20.0 SCHIZOPHRENIA, PARANOID: Primary | Chronic | ICD-10-CM

## 2023-11-22 DIAGNOSIS — F41.1 GENERALIZED ANXIETY DISORDER: Chronic | ICD-10-CM

## 2023-11-22 DIAGNOSIS — F41.0 PANIC DISORDER: Chronic | ICD-10-CM
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
## 2023-11-27 ENCOUNTER — TELEPHONE (OUTPATIENT)
Dept: PSYCHIATRY | Facility: CLINIC | Age: 33
End: 2023-11-27
Payer: MEDICAID

## 2023-11-27 NOTE — TELEPHONE ENCOUNTER
Pt called stating that she is still very angry patient is wanting to know can you give her a medication for that.Pt stated since she has been taking the Topamax she's still having issues with using the bathroom on herself patient went to urgent care she doesn't have a bladder infection.Pt also stated that her arm and legs are getting worse when she walks they will give out on her.Patient is not S/I H/I. Please advise

## 2023-11-28 DIAGNOSIS — F41.1 GENERALIZED ANXIETY DISORDER: Chronic | ICD-10-CM

## 2023-11-28 DIAGNOSIS — F41.0 PANIC DISORDER: Chronic | ICD-10-CM

## 2023-11-28 RX ORDER — GABAPENTIN 800 MG/1
800 TABLET ORAL 4 TIMES DAILY
Qty: 360 TABLET | Refills: 0 | Status: SHIPPED | OUTPATIENT
Start: 2023-11-28 | End: 2024-11-27

## 2023-12-06 ENCOUNTER — TELEPHONE (OUTPATIENT)
Dept: PSYCHIATRY | Facility: CLINIC | Age: 33
End: 2023-12-06
Payer: MEDICAID

## 2023-12-06 NOTE — TELEPHONE ENCOUNTER
Left pt a detailed vm to make pt aware that provider had ordered a UDS to be done before the end of Dec 2023.

## 2023-12-06 NOTE — TELEPHONE ENCOUNTER
Pt called and stated that their UDS has been ordered for the 20th of December, but they will not be able to get the UDS until 22nd and they will be out of their meds on the 16th. Will she still be able to get a refill before her UDS? Please advise

## 2023-12-11 ENCOUNTER — TELEPHONE (OUTPATIENT)
Dept: PSYCHIATRY | Facility: CLINIC | Age: 33
End: 2023-12-11
Payer: MEDICAID

## 2023-12-11 DIAGNOSIS — F41.0 PANIC DISORDER: ICD-10-CM

## 2023-12-11 DIAGNOSIS — F41.1 GENERALIZED ANXIETY DISORDER: Primary | ICD-10-CM

## 2023-12-11 NOTE — TELEPHONE ENCOUNTER
Can you send in a [facility] order please.     Once in I can send to the [facility] via fax at [phone]

## 2023-12-12 NOTE — TELEPHONE ENCOUNTER
[facility] order still did not come thru. Contacted  on help to get [facility] order so patient can go to [facility] near her.

## 2023-12-13 RX ORDER — CLONAZEPAM 1 MG/1
1 TABLET ORAL 3 TIMES DAILY PRN
Qty: 45 TABLET | Refills: 0 | Status: SHIPPED | OUTPATIENT
Start: 2023-12-13 | End: 2024-12-12

## 2023-12-13 NOTE — TELEPHONE ENCOUNTER
[facility]    Due to lab issues patient has been able to complete her UDS. Would it be possible to call in this months script as patient will be out of medication on the 16th, and will have to get a ride to the facility next week due to her car being out of service.     Please advise.

## 2023-12-22 LAB
AMPHETAMINES UR QL SCN: NEGATIVE NG/ML
BARBITURATES UR QL SCN: NEGATIVE NG/ML
BENZODIAZ UR QL: NEGATIVE NG/ML
BZE UR QL SCN: NEGATIVE NG/ML
CANNABINOIDS UR QL SCN: NEGATIVE NG/ML
CREAT UR-MCNC: 52.6 MG/DL (ref 20–300)
FENTANYL UR-MCNC: NEGATIVE PG/ML
LABORATORY COMMENT REPORT: NORMAL
MEPERIDINE UR QL: NEGATIVE NG/ML
METHADONE UR QL SCN: NEGATIVE NG/ML
OPIATES UR QL SCN: NEGATIVE NG/ML
OXYCODONE+OXYMORPHONE UR QL SCN: NEGATIVE NG/ML
PCP UR QL: NEGATIVE NG/ML
PH UR: 6 [PH] (ref 4.5–8.9)
PROPOXYPH UR QL SCN: NEGATIVE NG/ML
SP GR UR: 1.03
TRAMADOL UR QL SCN: NEGATIVE NG/ML

## 2023-12-25 DIAGNOSIS — F41.1 GENERALIZED ANXIETY DISORDER: ICD-10-CM

## 2023-12-25 DIAGNOSIS — F41.0 PANIC DISORDER: ICD-10-CM

## 2023-12-26 ENCOUNTER — TELEPHONE (OUTPATIENT)
Dept: PSYCHIATRY | Facility: CLINIC | Age: 33
End: 2023-12-26
Payer: MEDICAID

## 2023-12-26 RX ORDER — CLONAZEPAM 1 MG/1
1 TABLET ORAL 3 TIMES DAILY PRN
Qty: 45 TABLET | OUTPATIENT
Start: 2023-12-26

## 2023-12-26 RX ORDER — CLONAZEPAM 1 MG/1
1 TABLET ORAL 3 TIMES DAILY PRN
Qty: 45 TABLET | Refills: 0 | Status: SHIPPED | OUTPATIENT
Start: 2023-12-26 | End: 2024-12-25

## 2023-12-26 NOTE — TELEPHONE ENCOUNTER
Pt states that she completed her urine drug screen, but it is showing negative for Clonazepam. Pt. States she does take medication, and would like to know if there is a reason this could possibly happen.     Please advise.

## 2023-12-27 RX ORDER — HYDROXYZINE PAMOATE 25 MG/1
25 CAPSULE ORAL 3 TIMES DAILY PRN
Qty: 90 CAPSULE | Refills: 2 | Status: SHIPPED | OUTPATIENT
Start: 2023-12-27

## 2024-01-02 ENCOUNTER — TELEPHONE (OUTPATIENT)
Dept: PSYCHIATRY | Facility: CLINIC | Age: 34
End: 2024-01-02
Payer: MEDICAID

## 2024-01-02 NOTE — TELEPHONE ENCOUNTER
Patient picked up 45 pills for klonopin when she normally gets 90.  Patient would like to know what she needs to do.  Please advise.

## 2024-01-03 NOTE — PROGRESS NOTES
That was a short Rx until she got her drug screen done so she would not go without.  When she gets low on that Rx, she call in for a refill and I will change it back to 90 tabs

## 2024-01-04 DIAGNOSIS — F41.1 GENERALIZED ANXIETY DISORDER: ICD-10-CM

## 2024-01-04 DIAGNOSIS — F41.0 PANIC DISORDER: ICD-10-CM

## 2024-01-04 RX ORDER — CLONAZEPAM 1 MG/1
1 TABLET ORAL 3 TIMES DAILY PRN
Qty: 90 TABLET | Refills: 1 | Status: SHIPPED | OUTPATIENT
Start: 2024-01-04 | End: 2025-01-03

## 2024-01-11 ENCOUNTER — OFFICE VISIT (OUTPATIENT)
Dept: FAMILY MEDICINE CLINIC | Facility: CLINIC | Age: 34
End: 2024-01-11
Payer: MEDICAID

## 2024-01-11 ENCOUNTER — PATIENT ROUNDING (BHMG ONLY) (OUTPATIENT)
Dept: FAMILY MEDICINE CLINIC | Facility: CLINIC | Age: 34
End: 2024-01-11
Payer: MEDICAID

## 2024-01-11 VITALS
TEMPERATURE: 98.2 F | WEIGHT: 188 LBS | OXYGEN SATURATION: 97 % | SYSTOLIC BLOOD PRESSURE: 128 MMHG | HEART RATE: 83 BPM | BODY MASS INDEX: 29.44 KG/M2 | DIASTOLIC BLOOD PRESSURE: 70 MMHG

## 2024-01-11 DIAGNOSIS — R51.9 NEW ONSET HEADACHE: ICD-10-CM

## 2024-01-11 DIAGNOSIS — Z30.42 ENCOUNTER FOR SURVEILLANCE OF INJECTABLE CONTRACEPTIVE: Primary | ICD-10-CM

## 2024-01-11 DIAGNOSIS — E66.3 OVERWEIGHT (BMI 25.0-29.9): ICD-10-CM

## 2024-01-11 DIAGNOSIS — Z01.419 ENCOUNTER FOR GYNECOLOGICAL EXAMINATION (GENERAL) (ROUTINE) WITHOUT ABNORMAL FINDINGS: ICD-10-CM

## 2024-01-11 LAB
B-HCG UR QL: NEGATIVE
EXPIRATION DATE: NORMAL
INTERNAL NEGATIVE CONTROL: NORMAL
INTERNAL POSITIVE CONTROL: NORMAL
Lab: NORMAL

## 2024-01-11 RX ORDER — MEDROXYPROGESTERONE ACETATE 150 MG/ML
150 INJECTION, SUSPENSION INTRAMUSCULAR
Qty: 1 ML | Refills: 1 | Status: SHIPPED | OUTPATIENT
Start: 2024-01-11

## 2024-01-11 NOTE — PROGRESS NOTES
"Chief Complaint  Annual Exam and Gynecologic Exam (Discuss birth control options)    Subjective        Annmarie Perera presents to [facility]  History of Present Illness  Patient is here to establish care. She is in with mother. She is concerned for a bump on her left temple area. And headaches have increased over the last week.     She is also interested in getting her tubes tied. She has three kids and would like to prevent pregnancy. She is requesting a referral.    She is also having some intermittent breast tenderness, denies today. Fam hx of breast cancer and brain cancer.      She is schizophrenic and has an issue with hair pullling. She sees [facility] provider in [place], she self administers some of her injectable mental health medications.   Objective   Vital Signs:  /70 (BP Location: Left arm, Patient Position: Sitting, Cuff Size: Adult)   Pulse 83   Temp 98.2 °F (36.8 °C) (Temporal)   Wt 85.3 kg (188 lb)   SpO2 97%   BMI 29.44 kg/m²   Estimated body mass index is 29.44 kg/m² as calculated from the following:    Height as of 2/10/22: 170.2 cm (67\").    Weight as of this encounter: 85.3 kg (188 lb).             Physical Exam  Exam conducted with a chaperone present.   Constitutional:       Appearance: Normal appearance.   HENT:      Head: Normocephalic.   Eyes:      Extraocular Movements: Extraocular movements intact.      Pupils: Pupils are equal, round, and reactive to light.   Cardiovascular:      Rate and Rhythm: Normal rate and regular rhythm.   Pulmonary:      Effort: Pulmonary effort is normal.      Breath sounds: Normal breath sounds.   Chest:   Breasts:     Breasts are symmetrical.      Right: Normal.      Left: Normal.   Genitourinary:     General: Normal vulva.      Exam position: Prone.      Vagina: Normal.      Cervix: Normal.      Uterus: Normal.       Adnexa: Right adnexa normal and left adnexa normal.      Rectum: Normal.   Musculoskeletal:         " General: Normal range of motion.   Skin:     General: Skin is warm and dry.   Neurological:      General: No focal deficit present.      Mental Status: She is alert and oriented to person, place, and time.   Psychiatric:         Mood and Affect: Mood normal.        Result Review :                   Assessment and Plan   Diagnoses and all orders for this visit:    1. Encounter for surveillance of injectable contraceptive (Primary)  -     medroxyPROGESTERone (Depo-Provera) 150 MG/ML injection; Inject 1 mL into the appropriate muscle as directed by prescriber Every 3 (Three) Months.  Dispense: 1 mL; Refill: 1  -     Ambulatory Referral to Gynecology  -     POCT pregnancy, urine    2. New onset headache  -     CT Head Without Contrast; Future    3. Encounter for gynecological examination (general) (routine) without abnormal findings  -     CBC w AUTO Differential  -     Comprehensive metabolic panel  -     NuSwab VG+ - Swab, Vagina  -     IGP, Aptima HPV, CtNg Age Gdln    4. Overweight (BMI 25.0-29.9)  -     TSH Rfx On Abnormal To Free T4  -     Lipid panel    Patient is to schedule a nurse visit for depo injection after she get prescription from pharmacy.          Follow Up   Return in about 3 months (around 4/11/2024) for Recheck.  Patient was given instructions and counseling regarding her condition or for health maintenance advice. Please see specific information pulled into the AVS if appropriate.

## 2024-01-11 NOTE — PROGRESS NOTES
A My-Chart message has been sent to the patient for PATIENT ROUNDING with [facility]

## 2024-01-11 NOTE — PROGRESS NOTES
"Chief Complaint  Annual Exam and Gynecologic Exam (Discuss birth control options)    Subjective    {Problem List  Visit Diagnosis   Encounters  Notes  Medications  Labs  Result Review Imaging  Media :23}    Annmarie Perera presents to [facility]  History of Present Illness    Objective   Vital Signs:  /70 (BP Location: Left arm, Patient Position: Sitting, Cuff Size: Adult)   Pulse 83   Temp 98.2 °F (36.8 °C) (Temporal)   Wt 85.3 kg (188 lb)   SpO2 97%   BMI 29.44 kg/m²   Estimated body mass index is 29.44 kg/m² as calculated from the following:    Height as of 2/10/22: 170.2 cm (67\").    Weight as of this encounter: 85.3 kg (188 lb).       {BMI is >= 25 and <30. (Overweight) The following options were offered after discussion; (Optional):60512}      Physical Exam   Result Review :{Labs  Result Review  Imaging  Med Tab  Media  Procedures :23}  {The following data was reviewed by (Optional):00451}  {Ambulatory Labs (Optional):21098}  {Data reviewed (Optional):07814:::1}             Assessment and Plan {CC Problem List  Visit Diagnosis   ROS  Review (Popup)  Health Maintenance  Quality  BestPractice  Medications  SmartSets  SnapShot Encounters  Media :23}  There are no diagnoses linked to this encounter.       {Time Spent (Optional):88521}  Follow Up {Instructions Charge Capture  Follow-up Communications :23}  No follow-ups on file.  Patient was given instructions and counseling regarding her condition or for health maintenance advice. Please see specific information pulled into the AVS if appropriate.         "

## 2024-01-12 LAB
ALBUMIN SERPL-MCNC: 4.1 G/DL (ref 3.9–4.9)
ALBUMIN/GLOB SERPL: 2 {RATIO} (ref 1.2–2.2)
ALP SERPL-CCNC: 80 IU/L (ref 44–121)
ALT SERPL-CCNC: 7 IU/L (ref 0–32)
AST SERPL-CCNC: 11 IU/L (ref 0–40)
BASOPHILS # BLD AUTO: 0 X10E3/UL (ref 0–0.2)
BASOPHILS NFR BLD AUTO: 0 %
BILIRUB SERPL-MCNC: <0.2 MG/DL (ref 0–1.2)
BUN SERPL-MCNC: 11 MG/DL (ref 6–20)
BUN/CREAT SERPL: 15 (ref 9–23)
CALCIUM SERPL-MCNC: 8.5 MG/DL (ref 8.7–10.2)
CHLORIDE SERPL-SCNC: 104 MMOL/L (ref 96–106)
CHOLEST SERPL-MCNC: 137 MG/DL (ref 100–199)
CO2 SERPL-SCNC: 23 MMOL/L (ref 20–29)
CREAT SERPL-MCNC: 0.71 MG/DL (ref 0.57–1)
EGFRCR SERPLBLD CKD-EPI 2021: 114 ML/MIN/1.73
EOSINOPHIL # BLD AUTO: 0.1 X10E3/UL (ref 0–0.4)
EOSINOPHIL NFR BLD AUTO: 2 %
ERYTHROCYTE [DISTWIDTH] IN BLOOD BY AUTOMATED COUNT: 12 % (ref 11.7–15.4)
GLOBULIN SER CALC-MCNC: 2.1 G/DL (ref 1.5–4.5)
GLUCOSE SERPL-MCNC: 87 MG/DL (ref 70–99)
HCT VFR BLD AUTO: 39.9 % (ref 34–46.6)
HDLC SERPL-MCNC: 51 MG/DL
HGB BLD-MCNC: 13.3 G/DL (ref 11.1–15.9)
IMM GRANULOCYTES # BLD AUTO: 0 X10E3/UL (ref 0–0.1)
IMM GRANULOCYTES NFR BLD AUTO: 0 %
LDLC SERPL CALC-MCNC: 71 MG/DL (ref 0–99)
LYMPHOCYTES # BLD AUTO: 1.7 X10E3/UL (ref 0.7–3.1)
LYMPHOCYTES NFR BLD AUTO: 29 %
MCH RBC QN AUTO: 31.4 PG (ref 26.6–33)
MCHC RBC AUTO-ENTMCNC: 33.3 G/DL (ref 31.5–35.7)
MCV RBC AUTO: 94 FL (ref 79–97)
MONOCYTES # BLD AUTO: 0.4 X10E3/UL (ref 0.1–0.9)
MONOCYTES NFR BLD AUTO: 6 %
NEUTROPHILS # BLD AUTO: 3.7 X10E3/UL (ref 1.4–7)
NEUTROPHILS NFR BLD AUTO: 63 %
PLATELET # BLD AUTO: 226 X10E3/UL (ref 150–450)
POTASSIUM SERPL-SCNC: 4.4 MMOL/L (ref 3.5–5.2)
PROT SERPL-MCNC: 6.2 G/DL (ref 6–8.5)
RBC # BLD AUTO: 4.23 X10E6/UL (ref 3.77–5.28)
SODIUM SERPL-SCNC: 140 MMOL/L (ref 134–144)
TRIGL SERPL-MCNC: 78 MG/DL (ref 0–149)
TSH SERPL DL<=0.005 MIU/L-ACNC: 1.24 UIU/ML (ref 0.45–4.5)
VLDLC SERPL CALC-MCNC: 15 MG/DL (ref 5–40)
WBC # BLD AUTO: 5.8 X10E3/UL (ref 3.4–10.8)

## 2024-01-15 ENCOUNTER — TELEPHONE (OUTPATIENT)
Dept: FAMILY MEDICINE CLINIC | Facility: CLINIC | Age: 34
End: 2024-01-15

## 2024-01-15 DIAGNOSIS — Z30.42 ENCOUNTER FOR SURVEILLANCE OF INJECTABLE CONTRACEPTIVE: Primary | ICD-10-CM

## 2024-01-15 RX ORDER — MEDROXYPROGESTERONE ACETATE 150 MG/ML
150 INJECTION, SUSPENSION INTRAMUSCULAR ONCE
Status: COMPLETED | OUTPATIENT
Start: 2024-01-15 | End: 2024-01-16

## 2024-01-16 ENCOUNTER — CLINICAL SUPPORT (OUTPATIENT)
Dept: FAMILY MEDICINE CLINIC | Facility: CLINIC | Age: 34
End: 2024-01-16
Payer: MEDICAID

## 2024-01-16 ENCOUNTER — TELEPHONE (OUTPATIENT)
Dept: FAMILY MEDICINE CLINIC | Facility: CLINIC | Age: 34
End: 2024-01-16

## 2024-01-16 ENCOUNTER — TELEPHONE (OUTPATIENT)
Dept: FAMILY MEDICINE CLINIC | Facility: CLINIC | Age: 34
End: 2024-01-16
Payer: MEDICAID

## 2024-01-16 LAB
A VAGINAE DNA VAG QL NAA+PROBE: NORMAL SCORE
AGE GDLN ACOG TESTING: NORMAL
BVAB2 DNA VAG QL NAA+PROBE: NORMAL SCORE
C ALBICANS DNA VAG QL NAA+PROBE: NEGATIVE
C GLABRATA DNA VAG QL NAA+PROBE: NEGATIVE
C TRACH DNA VAG QL NAA+PROBE: NEGATIVE
CYTOLOGIST CVX/VAG CYTO: ABNORMAL
CYTOLOGY CVX/VAG DOC CYTO: ABNORMAL
CYTOLOGY CVX/VAG DOC THIN PREP: ABNORMAL
DX ICD CODE: ABNORMAL
HIV 1 & 2 AB SER-IMP: ABNORMAL
HPV GENOTYPE REFLEX: ABNORMAL
HPV I/H RISK 4 DNA CVX QL PROBE+SIG AMP: POSITIVE
HPV16 DNA CVX QL PROBE+SIG AMP: NEGATIVE
HPV18+45 E6+E7 MRNA CVX QL NAA+PROBE: NEGATIVE
MEGA1 DNA VAG QL NAA+PROBE: NORMAL SCORE
N GONORRHOEA DNA VAG QL NAA+PROBE: NEGATIVE
OTHER STN SPEC: ABNORMAL
STAT OF ADQ CVX/VAG CYTO-IMP: ABNORMAL
T VAGINALIS DNA VAG QL NAA+PROBE: NEGATIVE

## 2024-01-16 PROCEDURE — 96372 THER/PROPH/DIAG INJ SC/IM: CPT | Performed by: NURSE PRACTITIONER

## 2024-01-16 RX ADMIN — MEDROXYPROGESTERONE ACETATE 150 MG: 150 INJECTION, SUSPENSION INTRAMUSCULAR at 15:52

## 2024-01-16 NOTE — TELEPHONE ENCOUNTER
LMTCB      **HUB/FO** MAY RELAY MESSAGE        ----- Message from VENKAT Ngo sent at 1/16/2024  8:25 AM EST -----  Let patient know her pap smear was negative for malignancy but was positive for HPV. The reflex screening for HPV indicates it is not the aggressive type of HPV which can cause cancer. Repeat pap smear in 3 years.

## 2024-01-16 NOTE — TELEPHONE ENCOUNTER
Name: Annmarie Perera      Relationship: Self      Best Callback Number:       HUB PROVIDED THE RELAY MESSAGE FROM THE OFFICE      PATIENT: VOICED UNDERSTANDING AND HAS NO FURTHER QUESTIONS AT THIS TIME    ADDITIONAL INFORMATION:

## 2024-01-18 ENCOUNTER — TELEPHONE (OUTPATIENT)
Dept: PSYCHIATRY | Facility: CLINIC | Age: 34
End: 2024-01-18
Payer: MEDICAID

## 2024-01-18 NOTE — TELEPHONE ENCOUNTER
Pt called and stated they would like to go up the dosage on their Clonazepam due to taking care of her mother which is very sick and ill. Pt stated she did not want to ask, but feels as if she has no other choice. Please advise

## 2024-01-31 ENCOUNTER — HOSPITAL ENCOUNTER (OUTPATIENT)
Facility: HOSPITAL | Age: 34
Discharge: HOME | End: 2024-01-31
Admitting: NURSE PRACTITIONER
Payer: MEDICAID

## 2024-01-31 DIAGNOSIS — R51.9 NEW ONSET HEADACHE: ICD-10-CM

## 2024-01-31 PROCEDURE — 70450 CT HEAD/BRAIN W/O DYE: CPT

## 2024-02-01 ENCOUNTER — TELEPHONE (OUTPATIENT)
Dept: FAMILY MEDICINE CLINIC | Facility: CLINIC | Age: 34
End: 2024-02-01
Payer: MEDICAID

## 2024-02-01 DIAGNOSIS — R51.9 NEW ONSET HEADACHE: Primary | ICD-10-CM

## 2024-02-01 RX ORDER — TOPIRAMATE 25 MG/1
25 TABLET ORAL DAILY
Qty: 30 TABLET | Refills: 2 | Status: SHIPPED | OUTPATIENT
Start: 2024-02-01

## 2024-02-01 RX ORDER — SUMATRIPTAN 25 MG/1
TABLET, FILM COATED ORAL
Qty: 9 TABLET | Refills: 2 | Status: SHIPPED | OUTPATIENT
Start: 2024-02-01

## 2024-02-01 NOTE — TELEPHONE ENCOUNTER
PATIENT CALLED AND STATES SHE HAS A PRESCRIPTION FOR   topiramate (Topamax) 25 MG tablet  WHICH SHE SAW IN PHARMACY WEBSITE   SHE IS NOT ABLE TO TAKE THIS MEDICATION. IT MAKES HER URINATE HERSELF    SHE IS WANTING TO KNOW WHAT THE MEDICATION     SUMAtriptan (IMITREX) 25 MG tablet   IS FOR.    PLEASE CALL AND ADVISE [phone]

## 2024-02-22 ENCOUNTER — TELEMEDICINE (OUTPATIENT)
Dept: PSYCHIATRY | Facility: CLINIC | Age: 34
End: 2024-02-22
Payer: MEDICAID

## 2024-02-22 DIAGNOSIS — F63.3 TRICHOTILLOMANIA: Chronic | ICD-10-CM

## 2024-02-22 DIAGNOSIS — F20.0 SCHIZOPHRENIA, PARANOID: Primary | Chronic | ICD-10-CM

## 2024-02-22 DIAGNOSIS — F41.1 GENERALIZED ANXIETY DISORDER: Chronic | ICD-10-CM

## 2024-02-22 DIAGNOSIS — F41.0 PANIC DISORDER: Chronic | ICD-10-CM

## 2024-02-22 RX ORDER — CLONAZEPAM 1 MG/1
1 TABLET ORAL 3 TIMES DAILY PRN
Qty: 90 TABLET | Refills: 1 | Status: SHIPPED | OUTPATIENT
Start: 2024-02-22 | End: 2025-02-21
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
## 2024-03-11 DIAGNOSIS — F41.0 PANIC DISORDER: Chronic | ICD-10-CM

## 2024-03-11 DIAGNOSIS — F41.1 GENERALIZED ANXIETY DISORDER: Chronic | ICD-10-CM

## 2024-03-11 RX ORDER — OLANZAPINE 10 MG/1
10 TABLET ORAL 2 TIMES DAILY
Qty: 180 TABLET | Refills: 1 | Status: SHIPPED | OUTPATIENT
Start: 2024-03-11

## 2024-03-11 RX ORDER — GABAPENTIN 800 MG/1
800 TABLET ORAL 4 TIMES DAILY
Qty: 360 TABLET | Refills: 1 | Status: SHIPPED | OUTPATIENT
Start: 2024-03-11 | End: 2025-03-11

## 2024-03-12 ENCOUNTER — TELEPHONE (OUTPATIENT)
Dept: PSYCHIATRY | Facility: CLINIC | Age: 34
End: 2024-03-12
Payer: MEDICAID

## 2024-03-12 ENCOUNTER — OFFICE VISIT (OUTPATIENT)
Dept: OBSTETRICS AND GYNECOLOGY | Age: 34
End: 2024-03-12
Payer: MEDICAID

## 2024-03-12 VITALS — WEIGHT: 181.2 LBS | BODY MASS INDEX: 28.44 KG/M2 | HEIGHT: 67 IN

## 2024-03-12 DIAGNOSIS — Z30.2 REQUEST FOR STERILIZATION: ICD-10-CM

## 2024-03-12 DIAGNOSIS — R87.618 PAP SMEAR ABNORMALITY OF CERVIX/HUMAN PAPILLOMAVIRUS (HPV) POSITIVE: ICD-10-CM

## 2024-03-12 DIAGNOSIS — Z20.2 EXPOSURE TO STD: Primary | ICD-10-CM

## 2024-03-12 PROBLEM — Z86.69 HX OF MIGRAINE HEADACHES: Status: ACTIVE | Noted: 2024-03-12

## 2024-03-12 LAB
B-HCG UR QL: NEGATIVE
EXPIRATION DATE: NORMAL
INTERNAL NEGATIVE CONTROL: NEGATIVE
INTERNAL POSITIVE CONTROL: POSITIVE
Lab: NORMAL

## 2024-03-12 RX ORDER — HYDROXYZINE PAMOATE 25 MG/1
25 CAPSULE ORAL 3 TIMES DAILY PRN
COMMUNITY
Start: 2024-02-27 | End: 2024-03-14 | Stop reason: DRUGHIGH

## 2024-03-12 NOTE — PROGRESS NOTES
Subjective     Chief Complaint   Patient presents with    Gynecologic Exam     New GYN, Tubal consultation, Last Pap 2024 NEG, HPV POSITIVE, Pt has no complaints today         History of Present Illness    Annmarie Perera is a 33 y.o.  who is scheduled for a new gyn visit.   She has not had recent normal menses on depo-provera. Prior to depo-provera she notes her menses were regular.   She is interested in sterilization. She would like a tubal. She notes hx of pp depression. She notes she does not have custody of her children and would like to not be pregnant again.     Obstetric History:  OB History          3    Para   3    Term   3            AB        Living   3         SAB        IAB        Ectopic        Molar        Multiple        Live Births   3               Menstrual History:     No LMP recorded. Patient has had an injection.         Current contraception: Depo-Provera injections  History of abnormal Pap smear: yes, had hx of dysplasia as a teen treated with cryo; she also reports hx of abnormal pap with colposcopy at    Received Gardasil immunization: no  Perform regular self breast exam: no  Family history of uterine or ovarian cancer: no  Family History of colon cancer: no  Family history of breast cancer: yes - mat great GM     Mammogram: not indicated.  Colonoscopy: not indicated.  DEXA: not indicated.    Exercise: moderately active  Calcium/Vitamin D: adequate intake    The following portions of the patient's history were reviewed and updated as appropriate: allergies, current medications, past family history, past medical history, past social history, past surgical history, and problem list.    Review of Systems    Review of Systems   Constitutional: Negative for fatigue.   Respiratory: Negative for shortness of breath.   CV: negative for chest pain   Gastrointestinal: Negative for abdominal pain.   Genitourinary: Positive for irregular menses on depo  "provera  Neurological: Positive for migraine headaches.   Psychiatric/Behavioral: Positive for occ dysphoric mood.         Objective   Physical Exam    Ht 170.2 cm (67\")   Wt 82.2 kg (181 lb 3.2 oz)   BMI 28.38 kg/m²   General:   Alert, in no distress   Heart: regular rate and rhythm   Lungs: clear to auscultation bilaterally       Neck: supple   Abdomen: Soft, no tenderness or guarding   Pelvis: def   Extremities: No cords or tenderness   Neurologic: Alert and oriented   Psychiatric: appropriate     Assessment & Plan   Diagnoses and all orders for this visit:    1. Exposure to STD (Primary)  -     RPR  -     HIV-1 / O / 2 Ag / Antibody  -     Hepatitis B Surface Antigen  -     Chlamydia trachomatis, Neisseria gonorrhoeae, Trichomonas vaginalis, PCR - Swab, Vagina    2. Pap smear abnormality of cervix/human papillomavirus (HPV) positive    3. Request for sterilization        All questions answered.  Breast self exam technique reviewed and patient encouraged to perform self-exam monthly.  Smoking cessation advised    Discussed tubal sterilization and reviewed procedure of laparoscopic removal of fallopian tubes. Discussed procedure and reviewed risks to include bleeding, transfusion, infection, damage to organs, need for additional surgeries due to complications, anesthetic complications, DVT/PE and death. Also discussed permanence of procedure, 1/200 risk of failure and increased risk of ectopic following surgery. Recommend preop clearance from her psychiatrist for surgery related to medications/special considerations and pt agrees.     Positive HPV: pt notes remote hx of cryotherapy to cervix and abnormal pap smear with colposcopy while in inpatient facility. Recommend colposcopy due to positive HPV with history of cervical  dysplasia and pt agrees to book.     Exposure to STD: pt requests STD screen    35 min spent reviewing chart and with pt doing exam and providing counseling          "

## 2024-03-12 NOTE — TELEPHONE ENCOUNTER
Patient called she just saw her OB doctor and she is going to be scheduled for a tubal ligation in June/July and her doctor needs to know if she is on any medication that may affect the anesthesia?      Please Advise?    Thank You

## 2024-03-13 ENCOUNTER — TELEPHONE (OUTPATIENT)
Dept: OBSTETRICS AND GYNECOLOGY | Age: 34
End: 2024-03-13
Payer: MEDICAID

## 2024-03-13 ENCOUNTER — PRIOR AUTHORIZATION (OUTPATIENT)
Dept: PSYCHIATRY | Facility: CLINIC | Age: 34
End: 2024-03-13
Payer: MEDICAID

## 2024-03-13 DIAGNOSIS — A53.9 SYPHILIS: Primary | ICD-10-CM

## 2024-03-13 LAB
HBV SURFACE AG SERPL QL IA: NEGATIVE
HIV 1+2 AB+HIV1 P24 AG SERPL QL IA: NON REACTIVE
RPR SER QL: REACTIVE
RPR SER-TITR: ABNORMAL TITER

## 2024-03-13 NOTE — TELEPHONE ENCOUNTER
Provider: DR. ACOSTA    Caller: NU MCCONNELL    Relationship to Patient: SELF    Pharmacy:     Phone Number: [phone]    Reason for Call: PT WANTED TO ADV DR. ACOSTA THAT SHE CKD WITH HER PSYCHIATRIST WHO SAID AS FAR AS THE SURGERY AND ANESTHESIA. ADV DR. KENZIE CARIAS HAD ADV AS LONG AS SHE DOESN'T TAKE HER MEDICATIONS ESPECIALLY NOT THE KLONAPIN THAT IT SHOULD BE OKAY.      When was the patient last seen: 03-12

## 2024-03-13 NOTE — TELEPHONE ENCOUNTER
OB note from advising med management advising management of medication around surgery.    OB would also like a letter stating pt is mental stable to sign a consent to have surgery.    OBGYN Dr. Mima Larsen Fax number: [phone]

## 2024-03-13 NOTE — TELEPHONE ENCOUNTER
Patient called and was very angry because she may not be able to have her surgery due to her labs being abnormal for a STD.  Patient states they are now threatening that she can't see her daughter and patient states she is very aggravated.  Patient would like to know if provider can prescribe anything to calm her nerves for a few days.  Patient states it does not have to be narcotic.  Please advise.

## 2024-03-13 NOTE — TELEPHONE ENCOUNTER
Please call pt, will need letter from her MD advising management of medication around surgery and if she is able to sign consent.

## 2024-03-13 NOTE — TELEPHONE ENCOUNTER
Called patient and relayed info from provider. Patient said she understood instructions. Patient said she will call the office back when she gets a definite date for surgery.

## 2024-03-14 LAB
C TRACH RRNA SPEC QL NAA+PROBE: NEGATIVE
N GONORRHOEA RRNA SPEC QL NAA+PROBE: NEGATIVE
T VAGINALIS RRNA SPEC QL NAA+PROBE: NEGATIVE

## 2024-03-14 RX ORDER — HYDROXYZINE PAMOATE 50 MG/1
50 CAPSULE ORAL 3 TIMES DAILY PRN
Qty: 90 CAPSULE | Refills: 2 | Status: SHIPPED | OUTPATIENT
Start: 2024-03-14

## 2024-03-14 NOTE — TELEPHONE ENCOUNTER
Patient states that will work great!  Patient would like a new script called into [facility] in [place], [place].  Please advise.

## 2024-03-15 ENCOUNTER — TELEPHONE (OUTPATIENT)
Dept: OBSTETRICS AND GYNECOLOGY | Age: 34
End: 2024-03-15
Payer: MEDICAID

## 2024-03-15 NOTE — TELEPHONE ENCOUNTER
Pt contacted [facility] dept. To follow up on referral for visit to infectious disease.  Spoke with Rajani and they are waiting for the dr's office to review and call with an appointment.     Health department needs a infectious disease form report sent to them.  Their fax number is [phone].  Attn: Jarad

## 2024-03-18 ENCOUNTER — TELEPHONE (OUTPATIENT)
Dept: PSYCHIATRY | Facility: CLINIC | Age: 34
End: 2024-03-18
Payer: MEDICAID

## 2024-03-18 ENCOUNTER — TELEPHONE (OUTPATIENT)
Dept: FAMILY MEDICINE CLINIC | Facility: CLINIC | Age: 34
End: 2024-03-18
Payer: MEDICAID

## 2024-03-18 NOTE — TELEPHONE ENCOUNTER
Patient called, she wants to have her fallopian tubes removed but the Gynecology doctor,   Dr. Pauly Larsen will not perform the surgery without letters from you. These letters must be specific in nature in reference to patient's competency to make decisions.     Please contact Dr. Pauly Larsen [phone]   Fax # [phone]        Please Advise?        Thank You

## 2024-03-18 NOTE — TELEPHONE ENCOUNTER
Patient is scheduled for a physical and depo in April; she has been DX with syphilis, she is wanting to know if this will be a problem as far as her depo?  Please advise.

## 2024-03-19 ENCOUNTER — TELEPHONE (OUTPATIENT)
Dept: OBSTETRICS AND GYNECOLOGY | Age: 34
End: 2024-03-19
Payer: MEDICAID

## 2024-03-19 NOTE — TELEPHONE ENCOUNTER
[phone] Following up to see if pt received treatment for STD.

## 2024-03-20 ENCOUNTER — LAB (OUTPATIENT)
Dept: LAB | Facility: HOSPITAL | Age: 34
End: 2024-03-20
Payer: MEDICAID

## 2024-03-20 ENCOUNTER — OFFICE VISIT (OUTPATIENT)
Dept: INFECTIOUS DISEASES | Facility: CLINIC | Age: 34
End: 2024-03-20
Payer: MEDICAID

## 2024-03-20 VITALS — TEMPERATURE: 97.3 F | WEIGHT: 190.6 LBS | BODY MASS INDEX: 29.85 KG/M2 | RESPIRATION RATE: 14 BRPM

## 2024-03-20 DIAGNOSIS — A53.9 SYPHILIS: ICD-10-CM

## 2024-03-20 DIAGNOSIS — A53.9 SYPHILIS: Primary | ICD-10-CM

## 2024-03-20 LAB
ALBUMIN SERPL-MCNC: 4.2 G/DL (ref 3.5–5.2)
ALBUMIN/GLOB SERPL: 2.2 G/DL
ALP SERPL-CCNC: 87 U/L (ref 39–117)
ALT SERPL W P-5'-P-CCNC: 9 U/L (ref 1–33)
ANION GAP SERPL CALCULATED.3IONS-SCNC: 9 MMOL/L (ref 5–15)
AST SERPL-CCNC: 9 U/L (ref 1–32)
BASOPHILS # BLD AUTO: 0.03 10*3/MM3 (ref 0–0.2)
BASOPHILS NFR BLD AUTO: 0.4 % (ref 0–1.5)
BILIRUB SERPL-MCNC: <0.2 MG/DL (ref 0–1.2)
BUN SERPL-MCNC: 11 MG/DL (ref 6–20)
BUN/CREAT SERPL: 15.9 (ref 7–25)
CALCIUM SPEC-SCNC: 8.3 MG/DL (ref 8.6–10.5)
CHLORIDE SERPL-SCNC: 104 MMOL/L (ref 98–107)
CO2 SERPL-SCNC: 26 MMOL/L (ref 22–29)
CREAT SERPL-MCNC: 0.69 MG/DL (ref 0.57–1)
DEPRECATED RDW RBC AUTO: 40.6 FL (ref 37–54)
EGFRCR SERPLBLD CKD-EPI 2021: 116.2 ML/MIN/1.73
EOSINOPHIL # BLD AUTO: 0.1 10*3/MM3 (ref 0–0.4)
EOSINOPHIL NFR BLD AUTO: 1.4 % (ref 0.3–6.2)
ERYTHROCYTE [DISTWIDTH] IN BLOOD BY AUTOMATED COUNT: 11.8 % (ref 12.3–15.4)
GLOBULIN UR ELPH-MCNC: 1.9 GM/DL
GLUCOSE SERPL-MCNC: 87 MG/DL (ref 65–99)
HCT VFR BLD AUTO: 39 % (ref 34–46.6)
HGB BLD-MCNC: 12.9 G/DL (ref 12–15.9)
IMM GRANULOCYTES # BLD AUTO: 0.05 10*3/MM3 (ref 0–0.05)
IMM GRANULOCYTES NFR BLD AUTO: 0.7 % (ref 0–0.5)
LYMPHOCYTES # BLD AUTO: 1.58 10*3/MM3 (ref 0.7–3.1)
LYMPHOCYTES NFR BLD AUTO: 22.1 % (ref 19.6–45.3)
MCH RBC QN AUTO: 31.5 PG (ref 26.6–33)
MCHC RBC AUTO-ENTMCNC: 33.1 G/DL (ref 31.5–35.7)
MCV RBC AUTO: 95.1 FL (ref 79–97)
MONOCYTES # BLD AUTO: 0.51 10*3/MM3 (ref 0.1–0.9)
MONOCYTES NFR BLD AUTO: 7.1 % (ref 5–12)
NEUTROPHILS NFR BLD AUTO: 4.87 10*3/MM3 (ref 1.7–7)
NEUTROPHILS NFR BLD AUTO: 68.3 % (ref 42.7–76)
NRBC BLD AUTO-RTO: 0 /100 WBC (ref 0–0.2)
PLATELET # BLD AUTO: 239 10*3/MM3 (ref 140–450)
PMV BLD AUTO: 10.1 FL (ref 6–12)
POTASSIUM SERPL-SCNC: 4.1 MMOL/L (ref 3.5–5.2)
PROT SERPL-MCNC: 6.1 G/DL (ref 6–8.5)
RBC # BLD AUTO: 4.1 10*6/MM3 (ref 3.77–5.28)
SODIUM SERPL-SCNC: 139 MMOL/L (ref 136–145)
WBC NRBC COR # BLD AUTO: 7.14 10*3/MM3 (ref 3.4–10.8)

## 2024-03-20 PROCEDURE — 86780 TREPONEMA PALLIDUM: CPT

## 2024-03-20 PROCEDURE — 36415 COLL VENOUS BLD VENIPUNCTURE: CPT

## 2024-03-20 PROCEDURE — 85025 COMPLETE CBC W/AUTO DIFF WBC: CPT

## 2024-03-20 PROCEDURE — 99204 OFFICE O/P NEW MOD 45 MIN: CPT | Performed by: INTERNAL MEDICINE

## 2024-03-20 PROCEDURE — 80053 COMPREHEN METABOLIC PANEL: CPT

## 2024-03-20 NOTE — PROGRESS NOTES
Referring Provider: Mima Larsen MD  [address]  [address]  [place] [address]  Reason for clinic visits: Initial infectious disease clinic visit for positive RPR    HPI: Annmarie Perera is a 33 y.o. female who presents to the infectious disease clinic with above complaint.  The patient states she was diagnosed with syphilis about 8 years ago.  At that time she received 3 penicillin injections.  She does not recall what her RPR titer was.  In December 2020 RPR testing was nonreactive.  She had testing done in March due to concerns for STDs with her RPR coming back positive at 1:2.  She denies any vaginal discharge.  She has seen spotting after starting Depo-Provera.  She states she is also very emotional.  She denies any genital lesions or rashes.  She states she just had a new partner in the last 2 to 4 weeks and believes she got syphilis from him.  He is refusing to get tested or treated.    Past Medical History:   Diagnosis Date    Abdominal pain, epigastric 06/20/2017    Borderline personality disorder     Chronic pain syndrome 09/11/2017    Contraceptive management 01/23/2018    Dysuria 11/30/2017    Genital herpes 11/20/2017    High risk sexual behavior 11/20/2017    HPV (human papilloma virus) anogenital infection     Irregular menstruation 11/30/2017    refer to OB/GYN for further management 11-    Panic disorder     Poor compliance with medication 02/21/2019    Positive skin test for tuberculosis     Tuberculosis positive    Pyelonephritis, acute 06/20/2017    Improved 06-    Right knee pain 01/23/2018    Discussed symptomatic treatment, activity as tolerate. 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 She has never used smokeless tobacco. She reports that she does not currently use drugs after having used the following drugs: Cocaine(coke), Benzodiazepines, Oxycodone, Hydrocodone, and Marijuana. She reports that she does not drink alcohol.    Family History  family history includes Breast cancer in her maternal aunt and maternal grandmother; Other in her mother.    Allergies   Allergen Reactions    Morphine Irritability    Haloperidol Mental Status Change and Other (See Comments)     Abstraction from [employer]    Aluminum Silicate Unknown - Low Severity       The medication list has been reviewed and updated.     Review of Systems  Pertinent items are noted in HPI, all other systems reviewed and negative    Vital Signs   Vitals:    03/20/24 0949   Resp: 14   Temp: 97.3 °F (36.3 °C)         Physical Exam:   General: Anxious, restless   Respiratory: Breathing comfortably on room air  Skin: No rashes  Neurological: Alert and oriented  Psychiatric: Flight of ideas restless    Lab Results   Component Value Date    WBC 5.8 01/11/2024    HGB 13.3 01/11/2024    HCT 39.9 01/11/2024    MCV 94 01/11/2024     01/11/2024       Lab Results   Component Value Date    GLUCOSE 87 01/11/2024    BUN 11 01/11/2024    CREATININE 0.71 01/11/2024    EGFRIFNONA 83 02/06/2021    BCR 15 01/11/2024    CO2 23 01/11/2024    CALCIUM 8.5 (L) 01/11/2024    PROTENTOTREF 6.2 01/11/2024    ALBUMIN 4.1 01/11/2024    LABIL2 2.0 01/11/2024    AST 11 01/11/2024    ALT 7 01/11/2024     3/12 gonorrhea chlamydia trichomonas ELLA negative  3/12 RPR 1:2  3/12 hep B surface antigen negative  3/12 HIV neg    12/2020 RPR negative    Assessment:  This is a 33 y.o. female who presents to clinic today for evaluation of positive RPR.  The patient had syphilis 8 years ago and was treated appropriately.  It is unclear what her initial and subsequent titers were.   She certainly does have risk factors for being reinfected with syphilis but her RPR testing can also be a false positive or remanence of her infection 8 years ago.  At this time given the patient's anxiety will give her a dose of penicillin.  Will obtain baseline labs and obtain confirmatory testing.  If it is positive she would warrant 2 more doses of IM penicillin.  The patient and her mother voiced understanding    Plan:   Penicillin 2,400,000 units IM x 1 today  Obtain a CBC with differential and CMP  Obtain confirmatory syphilis testing    Return to Infectious Disease clinic TBD

## 2024-03-21 LAB — TREPONEMA PALLIDUM IGG+IGM AB [PRESENCE] IN SERUM OR PLASMA BY IMMUNOASSAY: NON REACTIVE

## 2024-03-21 NOTE — TELEPHONE ENCOUNTER
Letter sent via [employer] to the Dr. Larsen and to Annmarie but please fax to her also as requested.  Thank you

## 2024-03-22 ENCOUNTER — TELEPHONE (OUTPATIENT)
Dept: INFECTIOUS DISEASES | Facility: CLINIC | Age: 34
End: 2024-03-22
Payer: MEDICAID

## 2024-03-22 ENCOUNTER — PATIENT ROUNDING (BHMG ONLY) (OUTPATIENT)
Dept: INFECTIOUS DISEASES | Facility: CLINIC | Age: 34
End: 2024-03-22
Payer: MEDICAID

## 2024-03-22 NOTE — TELEPHONE ENCOUNTER
Per Dr. Casiano's request, I contacted patient and informed her that per Dr. Casiano, her confirmatory syphilis testing is negative and therefore her positive RPR test is most likely a false positive and she does not need to take any more doses of penicillin. I also informed her that per Dr. Casiano, her other labs are also within normal limits which include her white blood cell count, red blood cell count, kidney & liver function tests. Patient stated understanding and said that that news has made her so happy and she denied having any questions.    ----- Message from Martha Casiano MD sent at 3/22/2024  2:30 PM EDT -----  Can you please let the patient know that her confirmatory syphilis testing is negative therefore her positive RPR is most likely a false positive and she does not need any more doses of penicillin.  Her other labs are also within normal limits those include her white blood cell count red blood cell count kidney and liver function test.  ----- Message -----  From: Sadaf Panda  Sent: 3/21/2024  12:42 PM EDT  To: Martha Casiano MD    Patient called requesting the results of her labs.  Please call her at your convenience at [phone].

## 2024-03-22 NOTE — PROGRESS NOTES
March 22, 2024          DONE IN OFFICE            We're always looking for ways to make our patients' experiences even better. Do you have recommendations on ways we may improve?  no    Overall were you satisfied with your first visit to our practice? yes

## 2024-03-26 RX ORDER — ARIPIPRAZOLE 400 MG
KIT INTRAMUSCULAR
Qty: 1 EACH | Refills: 5 | Status: SHIPPED | OUTPATIENT
Start: 2024-03-26

## 2024-04-04 ENCOUNTER — TELEPHONE (OUTPATIENT)
Dept: FAMILY MEDICINE CLINIC | Facility: CLINIC | Age: 34
End: 2024-04-04

## 2024-04-04 ENCOUNTER — TELEPHONE (OUTPATIENT)
Dept: OBSTETRICS AND GYNECOLOGY | Age: 34
End: 2024-04-04
Payer: MEDICAID

## 2024-04-04 RX ORDER — FLUCONAZOLE 150 MG/1
150 TABLET ORAL ONCE
Qty: 1 TABLET | Refills: 0 | Status: SHIPPED | OUTPATIENT
Start: 2024-04-04 | End: 2024-04-04

## 2024-04-04 NOTE — TELEPHONE ENCOUNTER
Caller: Annmarie Perera    Relationship: Self    Best call back number: [phone]     What medication are you requesting:     What are your current symptoms: VAGINAL ITCHING AND BLEEDING    How long have you been experiencing symptoms: ABOUT 2 MONTHS    If a prescription is needed, what is your preferred pharmacy and phone number: [id number] - [facility] [address] AT [place] [address] & [place] [address] - [phone] [facility] [phone] FX     Additional notes: PATIENT REQUESTS MEDICINE TO TREAT POSSIBLE INFECTION. REQUESTS BOTH ORAL AND CREAM. CALL IF ADDITIONAL FOLLOW UP NEEDED

## 2024-04-04 NOTE — TELEPHONE ENCOUNTER
Called patient she can't come in until her appointment on the 11th will discuss concerns at this time

## 2024-04-08 ENCOUNTER — TELEPHONE (OUTPATIENT)
Dept: PSYCHIATRY | Facility: CLINIC | Age: 34
End: 2024-04-08
Payer: MEDICAID

## 2024-04-08 DIAGNOSIS — F41.1 GENERALIZED ANXIETY DISORDER: Chronic | ICD-10-CM

## 2024-04-08 DIAGNOSIS — F41.0 PANIC DISORDER: Chronic | ICD-10-CM

## 2024-04-09 ENCOUNTER — TELEPHONE (OUTPATIENT)
Dept: PSYCHIATRY | Facility: CLINIC | Age: 34
End: 2024-04-09
Payer: MEDICAID

## 2024-04-09 RX ORDER — CLONAZEPAM 2 MG/1
1 TABLET ORAL 3 TIMES DAILY PRN
Qty: 45 TABLET | Refills: 1 | Status: SHIPPED | OUTPATIENT
Start: 2024-04-09 | End: 2025-04-09

## 2024-04-09 NOTE — TELEPHONE ENCOUNTER
Disregard this message patient called back she will discuss the diazepam on her next appointment. Pt called requesting to go back on the Diazepam.

## 2024-04-09 NOTE — TELEPHONE ENCOUNTER
Patient states she needs a letter from provider stating she is good to under go anesthesia for her fallopian tube surgery.  Patient states it specifically needs to state the instructions you gave her on the klonopin.  Please advise.

## 2024-04-10 ENCOUNTER — TELEPHONE (OUTPATIENT)
Dept: PSYCHIATRY | Facility: CLINIC | Age: 34
End: 2024-04-10
Payer: MEDICAID

## 2024-04-10 ENCOUNTER — TELEPHONE (OUTPATIENT)
Dept: OBSTETRICS AND GYNECOLOGY | Age: 34
End: 2024-04-10
Payer: MEDICAID

## 2024-04-10 NOTE — TELEPHONE ENCOUNTER
"Patient called and states she wasn't aware that taking klonopin was a \"big deal\" and she wants to know if provider would rather put her back on diazepam three times a day.  Please advise.   "

## 2024-04-11 ENCOUNTER — OFFICE VISIT (OUTPATIENT)
Dept: FAMILY MEDICINE CLINIC | Facility: CLINIC | Age: 34
End: 2024-04-11
Payer: MEDICAID

## 2024-04-11 ENCOUNTER — TELEPHONE (OUTPATIENT)
Dept: PSYCHIATRY | Facility: CLINIC | Age: 34
End: 2024-04-11
Payer: MEDICAID

## 2024-04-11 VITALS
DIASTOLIC BLOOD PRESSURE: 80 MMHG | WEIGHT: 185 LBS | TEMPERATURE: 98 F | OXYGEN SATURATION: 95 % | HEIGHT: 67 IN | HEART RATE: 99 BPM | SYSTOLIC BLOOD PRESSURE: 108 MMHG | BODY MASS INDEX: 29.03 KG/M2

## 2024-04-11 DIAGNOSIS — Z30.42 ENCOUNTER FOR SURVEILLANCE OF INJECTABLE CONTRACEPTIVE: Primary | ICD-10-CM

## 2024-04-11 DIAGNOSIS — R51.9 NEW ONSET HEADACHE: ICD-10-CM

## 2024-04-11 RX ORDER — SUMATRIPTAN 25 MG/1
TABLET, FILM COATED ORAL
Qty: 9 TABLET | Refills: 2 | Status: SHIPPED | OUTPATIENT
Start: 2024-04-11

## 2024-04-11 RX ORDER — MEDROXYPROGESTERONE ACETATE 150 MG/ML
150 INJECTION, SUSPENSION INTRAMUSCULAR ONCE
Status: COMPLETED | OUTPATIENT
Start: 2024-04-11 | End: 2024-04-11

## 2024-04-11 RX ADMIN — MEDROXYPROGESTERONE ACETATE 150 MG: 150 INJECTION, SUSPENSION INTRAMUSCULAR at 14:47

## 2024-04-11 NOTE — TELEPHONE ENCOUNTER
Patient called stating the klonopin is not helping her as well as the diazapam and patient would like to know if provider will put her back on diazapam 3 times a day.  Please adivse.

## 2024-04-11 NOTE — PHARMACY RECOMMENDATION
Patient urine culture resulted with E. coli. Susceptible to Nitrofurantoin. Patient was given Rx for nitrofurantoin. Therapy is appropriate coverage. No further follow-up required..    Microbiology Results (last 10 days)       Procedure Component Value - Date/Time    Urine Culture - Urine, Urine, Catheter In/Out [[id number]]  (Abnormal)  (Susceptibility) Collected: 05/15/21 1430    Lab Status: Final result Specimen: Urine, Catheter In/Out Updated: 05/17/21 1054     Urine Culture >100,000 CFU/mL Escherichia coli    Susceptibility        Escherichia coli      SUSANA      Ampicillin Resistant      Ampicillin + Sulbactam Intermediate      Cefazolin Susceptible      Cefepime Susceptible      Ceftazidime Susceptible      Ceftriaxone Susceptible      Gentamicin Susceptible      Levofloxacin Susceptible      Nitrofurantoin Susceptible      Piperacillin + Tazobactam Susceptible      Tetracycline Susceptible      Trimethoprim + Sulfamethoxazole Susceptible                 Linear View                               Dudley Trujillo, Pharmacy Intern  5/17/2021 11:52 EDT    Regarding: TRACY MEDRANO 70 Tightness in chest, loose cough going on now for 3 wks, wheezing sometimes,asthmatic  ----- Message from Radha Mayes sent at 4/11/2024 12:31 PM CDT -----  Patient Name: Laurent Gómez    Specialist or PCP Name: Dr. Derrell Ramesh    Symptoms: Tightness in chest, loose cough going on now for 3 wks, wheezing sometimes, asthmatic     Pregnant (females aged 13-60. If Yes, how long?) :     Call Back # : [phone]    Which State are you currently located in?: [place]    Name of Clinic Site / Acct# : [id number] [place] [address]

## 2024-04-11 NOTE — PROGRESS NOTES
"Chief Complaint  Headache and irregular vaginal bleeding    Subjective        Annmarie Perera presents to [facility]  History of Present Illness  Follows up with gynecology this month due to some concerns with a false positive syphillis test as well as a history of cervical dysplasia for which she had a leep. She is wanting to get her depo shot today while in office.  She was concerned for vaginal bleeding that occurred for one month after last medroxyprogesterone injection. She cannot have any implanting methods of birth control nor is she able to remember to take a daily pill.     Neurology appt on 4/29/24 for headaches. She needs a refill on her sumatriptan    Objective   Vital Signs:  /80 (BP Location: Left arm, Patient Position: Sitting, Cuff Size: Adult)   Pulse 99   Temp 98 °F (36.7 °C) (Temporal)   Ht 170.2 cm (67\")   Wt 83.9 kg (185 lb)   SpO2 95%   BMI 28.98 kg/m²   Estimated body mass index is 28.98 kg/m² as calculated from the following:    Height as of this encounter: 170.2 cm (67\").    Weight as of this encounter: 83.9 kg (185 lb).               Physical Exam  Constitutional:       Appearance: Normal appearance.   HENT:      Head: Normocephalic.      Nose: Nose normal.   Eyes:      Extraocular Movements: Extraocular movements intact.      Pupils: Pupils are equal, round, and reactive to light.   Pulmonary:      Effort: Pulmonary effort is normal.   Musculoskeletal:         General: Normal range of motion.      Cervical back: Normal range of motion.   Skin:     General: Skin is warm and dry.   Neurological:      General: No focal deficit present.      Mental Status: She is alert and oriented to person, place, and time.   Psychiatric:         Attention and Perception: Attention normal.         Mood and Affect: Mood normal. Affect is flat.         Speech: Speech is slurred.         Behavior: Behavior is cooperative.        Result Review :                   "   Assessment and Plan     Diagnoses and all orders for this visit:    1. Encounter for surveillance of injectable contraceptive (Primary)  -     MedroxyPROGESTERone Acetate (DEPO-PROVERA) injection 150 mg    2. New onset headache  -     SUMAtriptan (IMITREX) 25 MG tablet; Take one tablet at onset of headache. May repeat dose one time in 2 hours if headache not relieved.  Dispense: 9 tablet; Refill: 2      Make a follow up for repeat depot injection between June 20 and July 9, 2024.       Follow Up     Return if symptoms worsen or fail to improve.  Patient was given instructions and counseling regarding her condition or for health maintenance advice. Please see specific information pulled into the AVS if appropriate.

## 2024-04-16 ENCOUNTER — PROCEDURE VISIT (OUTPATIENT)
Dept: OBSTETRICS AND GYNECOLOGY | Age: 34
End: 2024-04-16
Payer: MEDICAID

## 2024-04-16 VITALS
WEIGHT: 178.4 LBS | SYSTOLIC BLOOD PRESSURE: 112 MMHG | HEIGHT: 67 IN | BODY MASS INDEX: 28 KG/M2 | DIASTOLIC BLOOD PRESSURE: 66 MMHG

## 2024-04-16 DIAGNOSIS — R87.618 PAP SMEAR ABNORMALITY OF CERVIX/HUMAN PAPILLOMAVIRUS (HPV) POSITIVE: ICD-10-CM

## 2024-04-16 DIAGNOSIS — Z76.89 ENCOUNTER FOR BIOPSY: Primary | ICD-10-CM

## 2024-04-16 PROBLEM — A53.9 SYPHILIS: Status: RESOLVED | Noted: 2024-03-13 | Resolved: 2024-04-16

## 2024-04-16 PROBLEM — N76.0 VAGINITIS: Status: RESOLVED | Noted: 2017-11-20 | Resolved: 2024-04-16

## 2024-04-16 PROCEDURE — 57454 BX/CURETT OF CERVIX W/SCOPE: CPT | Performed by: OBSTETRICS & GYNECOLOGY

## 2024-04-16 PROCEDURE — 81025 URINE PREGNANCY TEST: CPT | Performed by: OBSTETRICS & GYNECOLOGY

## 2024-04-16 NOTE — PROGRESS NOTES
Procedure   Procedures       Physical Exam  Genitourinary:           Comments: AWE noted as marked in red and biopsied.     Colposcopy Procedure Note    Indications: Pap smear 3 months ago showed: negative pap with positive HPV. Pt reports a remote history of cryotherapy. She denies recent abnormal paps but cannot recall last recent testing.     Procedure Details   The risks and benefits of the procedure and  verbal and written  informed consent obtained. A procedural time out was performed.     Speculum placed in vagina and excellent visualization of cervix achieved, cervix swabbed x 3 with acetic acid solution.    Findings:  Cervix: acetowhite lesion(s) noted at 12,6 o'clock; cervix swabbed with Lugol's solution, endocervical curettage performed, cervical biopsies taken at 12,6 o'clock, specimen labelled and sent to pathology, and hemostasis achieved with Monsel's solution.      Specimens: ECC and cervical biopsies from 12, 6:00    Complications: none.    Patient tolerated the procedure well without complications.    Plan:  Specimens labelled and sent to Pathology.  Will base further treatment on Pathology findings.  If HSIL noted, recommend LEEP at the time of her desired salpingectomy for sterilization. Provided LEEP handout. If LSIL or negative, would recommend observation and pt notes agreement. Advised pt to call if she does not receive results of biopsies within 10 days  Smoking cessation recommended. Recommended gardasil vaccine. Pt declines at this time.         4/16/2024  Mima Larsen MD

## 2024-04-17 ENCOUNTER — TELEMEDICINE (OUTPATIENT)
Dept: PSYCHIATRY | Facility: CLINIC | Age: 34
End: 2024-04-17
Payer: MEDICAID

## 2024-04-17 DIAGNOSIS — F63.3 TRICHOTILLOMANIA: Chronic | ICD-10-CM

## 2024-04-17 DIAGNOSIS — F41.1 GENERALIZED ANXIETY DISORDER: Chronic | ICD-10-CM

## 2024-04-17 DIAGNOSIS — F20.0 SCHIZOPHRENIA, PARANOID: Primary | Chronic | ICD-10-CM

## 2024-04-17 DIAGNOSIS — F41.0 PANIC DISORDER: Chronic | ICD-10-CM

## 2024-04-17 RX ORDER — DIAZEPAM 5 MG/1
5 TABLET ORAL EVERY 8 HOURS PRN
Qty: 90 TABLET | Refills: 0 | Status: SHIPPED | OUTPATIENT
Start: 2024-04-17 | End: 2025-04-17
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
## 2024-04-18 LAB
DX ICD CODE: NORMAL
DX ICD CODE: NORMAL
PATH REPORT.FINAL DX SPEC: NORMAL
PATH REPORT.GROSS SPEC: NORMAL
PATH REPORT.SITE OF ORIGIN SPEC: NORMAL
PATHOLOGIST NAME: NORMAL
PAYMENT PROCEDURE: NORMAL

## 2024-04-22 ENCOUNTER — TELEPHONE (OUTPATIENT)
Dept: PSYCHIATRY | Facility: CLINIC | Age: 34
End: 2024-04-22
Payer: MEDICAID

## 2024-04-22 NOTE — TELEPHONE ENCOUNTER
Pt called stating that the Valium 5 mg isnt working.Pt is wanting to know can you send in Valium 10 mg for next month.

## 2024-04-24 ENCOUNTER — TELEPHONE (OUTPATIENT)
Dept: PSYCHIATRY | Facility: CLINIC | Age: 34
End: 2024-04-24
Payer: MEDICAID

## 2024-04-24 ENCOUNTER — TELEPHONE (OUTPATIENT)
Dept: OBSTETRICS AND GYNECOLOGY | Age: 34
End: 2024-04-24
Payer: MEDICAID

## 2024-04-24 DIAGNOSIS — F41.0 PANIC DISORDER: Chronic | ICD-10-CM

## 2024-04-24 DIAGNOSIS — F63.3 TRICHOTILLOMANIA: Chronic | ICD-10-CM

## 2024-04-24 DIAGNOSIS — F41.1 GENERALIZED ANXIETY DISORDER: Chronic | ICD-10-CM

## 2024-04-24 PROBLEM — N92.6 IRREGULAR MENSTRUATION: Status: RESOLVED | Noted: 2017-11-30 | Resolved: 2024-04-24

## 2024-04-24 NOTE — TELEPHONE ENCOUNTER
Pt requesting results of BX    Called pt and reviewed biopsy results. Advised results are benign and we can proceed with scheduling tubal surgery. Pt notes her mother is recovering from surgery and she cannot proceed with surgery at this time. She notes she will call office when she is ready to schedule. She agrees to continue depoprovera until that time. Advised f/u pap 1 year.
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
## 2024-04-25 NOTE — TELEPHONE ENCOUNTER
Patient has called a couple times since leaving the original message. She said she wont increase unless she gets ok from you?      Thank You

## 2024-04-26 RX ORDER — DIAZEPAM 10 MG/1
10 TABLET ORAL EVERY 8 HOURS PRN
Qty: 90 TABLET | Refills: 0 | Status: SHIPPED | OUTPATIENT
Start: 2024-05-05 | End: 2025-05-05

## 2024-04-29 ENCOUNTER — SPECIALTY PHARMACY (OUTPATIENT)
Dept: NEUROLOGY | Facility: CLINIC | Age: 34
End: 2024-04-29
Payer: MEDICAID

## 2024-04-29 ENCOUNTER — OFFICE VISIT (OUTPATIENT)
Dept: NEUROLOGY | Facility: CLINIC | Age: 34
End: 2024-04-29
Payer: MEDICAID

## 2024-04-29 VITALS
WEIGHT: 178 LBS | HEART RATE: 95 BPM | BODY MASS INDEX: 27.94 KG/M2 | HEIGHT: 67 IN | DIASTOLIC BLOOD PRESSURE: 78 MMHG | SYSTOLIC BLOOD PRESSURE: 114 MMHG

## 2024-04-29 DIAGNOSIS — G43.719 INTRACTABLE CHRONIC MIGRAINE WITHOUT AURA AND WITHOUT STATUS MIGRAINOSUS: Primary | ICD-10-CM

## 2024-04-29 PROCEDURE — 99204 OFFICE O/P NEW MOD 45 MIN: CPT | Performed by: PSYCHIATRY & NEUROLOGY

## 2024-04-29 PROCEDURE — 1160F RVW MEDS BY RX/DR IN RCRD: CPT | Performed by: PSYCHIATRY & NEUROLOGY

## 2024-04-29 PROCEDURE — 1159F MED LIST DOCD IN RCRD: CPT | Performed by: PSYCHIATRY & NEUROLOGY

## 2024-04-29 RX ORDER — RIZATRIPTAN BENZOATE 5 MG/1
5 TABLET ORAL ONCE AS NEEDED
Qty: 12 TABLET | Refills: 2 | Status: SHIPPED | OUTPATIENT
Start: 2024-04-29

## 2024-04-29 RX ORDER — FREMANEZUMAB-VFRM 225 MG/1.5ML
225 INJECTION SUBCUTANEOUS ONCE
Qty: 1.68 ML | Refills: 0 | COMMUNITY
Start: 2024-04-29 | End: 2024-04-29

## 2024-04-29 RX ORDER — FREMANEZUMAB-VFRM 225 MG/1.5ML
225 INJECTION SUBCUTANEOUS
Qty: 1.68 ML | Refills: 2 | Status: SHIPPED | OUTPATIENT
Start: 2024-04-29 | End: 2024-05-03 | Stop reason: SDUPTHER
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
## 2024-05-01 ENCOUNTER — PREP FOR SURGERY (OUTPATIENT)
Dept: OTHER | Facility: HOSPITAL | Age: 34
End: 2024-05-01
Payer: MEDICAID

## 2024-05-01 ENCOUNTER — TELEPHONE (OUTPATIENT)
Dept: OBSTETRICS AND GYNECOLOGY | Age: 34
End: 2024-05-01
Payer: MEDICAID

## 2024-05-01 DIAGNOSIS — Z30.2 REQUEST FOR STERILIZATION: Primary | ICD-10-CM

## 2024-05-01 RX ORDER — SODIUM CHLORIDE 0.9 % (FLUSH) 0.9 %
10 SYRINGE (ML) INJECTION AS NEEDED
OUTPATIENT
Start: 2024-05-01

## 2024-05-01 RX ORDER — SODIUM CHLORIDE 0.9 % (FLUSH) 0.9 %
10 SYRINGE (ML) INJECTION EVERY 12 HOURS SCHEDULED
OUTPATIENT
Start: 2024-05-01

## 2024-05-01 RX ORDER — SODIUM CHLORIDE 9 MG/ML
40 INJECTION, SOLUTION INTRAVENOUS AS NEEDED
OUTPATIENT
Start: 2024-05-01

## 2024-05-01 NOTE — TELEPHONE ENCOUNTER
Caller: Annmarie Perera    Relationship to patient: Self    Best call back number: [phone]    Patient is needing: PT CALLING TO SCHEDULE TUBAL SURGERY WITH DR. ACOSTA. PT STATES HER MOTHER IS CLEAR FROM HER PROCEDURE SO PATIENT CAN SCHEDULE HERS. ASKING FOR CALL BACK.

## 2024-05-03 ENCOUNTER — PATIENT ROUNDING (BHMG ONLY) (OUTPATIENT)
Dept: NEUROLOGY | Facility: CLINIC | Age: 34
End: 2024-05-03
Payer: MEDICAID

## 2024-05-03 ENCOUNTER — TELEPHONE (OUTPATIENT)
Dept: NEUROLOGY | Facility: CLINIC | Age: 34
End: 2024-05-03
Payer: MEDICAID

## 2024-05-03 DIAGNOSIS — G43.719 INTRACTABLE CHRONIC MIGRAINE WITHOUT AURA AND WITHOUT STATUS MIGRAINOSUS: ICD-10-CM

## 2024-05-03 RX ORDER — FREMANEZUMAB-VFRM 225 MG/1.5ML
225 INJECTION SUBCUTANEOUS
Qty: 1.68 ML | Refills: 2 | Status: SHIPPED | OUTPATIENT
Start: 2024-05-03

## 2024-05-03 NOTE — TELEPHONE ENCOUNTER
Caller: Annmarie Perera    Relationship: Self    Best call back number: [phone]     Preferred pharmacy: [id number] - [facility] [address] [address] [facility] OF [place] [address] & [place] [address] - [phone] [facility] [phone] FX     What was the call regarding: PT STATES SHE SPOKE W/ [facility] TODAY AND WAS ADVISED THEY NEVER RECEIVED THE AJOVY RX SENT ON MONDAY, 4/29/24. SHE DID CONFIRM THEY RECEIVED THE RX FOR RIZATRIPTAN BUT NOT THE AJOVY- EVEN THOUGH THERE IS A CONFIRMATION RECEIPT STATING THE RX WAS RECEIVED AT 2:09PM.    PT ASKS IF OFFICE CAN RESEND RX.    Do you require a callback: YES, PLEASE    PLEASE REVIEW AND ADVISE.

## 2024-05-09 ENCOUNTER — TELEPHONE (OUTPATIENT)
Dept: PSYCHIATRY | Facility: CLINIC | Age: 34
End: 2024-05-09
Payer: MEDICAID

## 2024-05-10 ENCOUNTER — TELEPHONE (OUTPATIENT)
Dept: OBSTETRICS AND GYNECOLOGY | Age: 34
End: 2024-05-10
Payer: MEDICAID

## 2024-05-10 ENCOUNTER — TELEPHONE (OUTPATIENT)
Dept: NEUROLOGY | Facility: CLINIC | Age: 34
End: 2024-05-10

## 2024-05-10 PROBLEM — Z30.2 REQUEST FOR STERILIZATION: Status: ACTIVE | Noted: 2024-05-01

## 2024-05-10 NOTE — TELEPHONE ENCOUNTER
Caller: Annmarie Perera    Relationship: Self    Best call back number: [phone]    What was the call regarding: PT WANTED TO CALL AND LET DR. LOPEZ KNOW THAT SHE DOES NOT PLAN TO  AND START THE AJOVY RX. PT STATES SHE ALREADY COMPLETES TWO OTHER INJECTION-TYPE MEDICATIONS, DEPO & ABILIFY, THEREFORE SHE DOES NOT FEEL COMFORTABLE STARTING ANOTHER SHOT.    SHE HAS PICKED UP THE RIZATRIPTAN RX AND WILL TRY USING THIS ALONE. SHE WILL CALL BACK WITH ANY CONCERNS.    Do you require a callback: IF NEEDED.    PLEASE REVIEW AND ADVISE.

## 2024-05-13 ENCOUNTER — TELEPHONE (OUTPATIENT)
Dept: PSYCHIATRY | Facility: CLINIC | Age: 34
End: 2024-05-13
Payer: MEDICAID

## 2024-05-15 DIAGNOSIS — F41.1 GENERALIZED ANXIETY DISORDER: Chronic | ICD-10-CM

## 2024-05-15 DIAGNOSIS — F63.3 TRICHOTILLOMANIA: Chronic | ICD-10-CM

## 2024-05-15 DIAGNOSIS — F41.0 PANIC DISORDER: Chronic | ICD-10-CM

## 2024-05-15 RX ORDER — DIAZEPAM 10 MG/1
TABLET ORAL
Qty: 90 TABLET | OUTPATIENT
Start: 2024-05-15

## 2024-05-15 NOTE — TELEPHONE ENCOUNTER
Pt states she is very upset and mad because she was not able to see her daughter today.  Pt would like to know what she can do to help with the anger. Pt states the diazepam is helping with her anxiety but is not helping with the anger and she is so upset that her chest is hurting.

## 2024-05-16 ENCOUNTER — TELEPHONE (OUTPATIENT)
Dept: PSYCHIATRY | Facility: CLINIC | Age: 34
End: 2024-05-16
Payer: MEDICAID

## 2024-05-16 NOTE — TELEPHONE ENCOUNTER
Patient called stating that she spoke with Jeanne yesterday in regards to refills and she was just calling back to tell us that the pharmacy sent refill requests over for her medication by mistake.  Patient states she knew it was too early for this medication and it wasn't her fault.  She states the pharmacy made a mistake.  Patient would like for provider to be aware of this.

## 2024-05-28 ENCOUNTER — PRE-ADMISSION TESTING (OUTPATIENT)
Dept: PREADMISSION TESTING | Facility: HOSPITAL | Age: 34
End: 2024-05-28
Payer: MEDICAID

## 2024-05-28 VITALS
SYSTOLIC BLOOD PRESSURE: 104 MMHG | DIASTOLIC BLOOD PRESSURE: 66 MMHG | RESPIRATION RATE: 18 BRPM | HEIGHT: 66 IN | TEMPERATURE: 98.3 F | HEART RATE: 73 BPM | BODY MASS INDEX: 30.05 KG/M2 | OXYGEN SATURATION: 100 % | WEIGHT: 187 LBS

## 2024-05-28 DIAGNOSIS — F41.0 PANIC DISORDER: Chronic | ICD-10-CM

## 2024-05-28 DIAGNOSIS — Z30.2 REQUEST FOR STERILIZATION: ICD-10-CM

## 2024-05-28 DIAGNOSIS — F41.1 GENERALIZED ANXIETY DISORDER: Chronic | ICD-10-CM

## 2024-05-28 DIAGNOSIS — F63.3 TRICHOTILLOMANIA: Chronic | ICD-10-CM

## 2024-05-28 LAB
ALBUMIN SERPL-MCNC: 4.6 G/DL (ref 3.5–5.2)
ALBUMIN/GLOB SERPL: 2.1 G/DL
ALP SERPL-CCNC: 65 U/L (ref 39–117)
ALT SERPL W P-5'-P-CCNC: 8 U/L (ref 1–33)
ANION GAP SERPL CALCULATED.3IONS-SCNC: 10.4 MMOL/L (ref 5–15)
AST SERPL-CCNC: 10 U/L (ref 1–32)
BASOPHILS # BLD AUTO: 0.03 10*3/MM3 (ref 0–0.2)
BASOPHILS NFR BLD AUTO: 0.4 % (ref 0–1.5)
BILIRUB SERPL-MCNC: 0.3 MG/DL (ref 0–1.2)
BUN SERPL-MCNC: 17 MG/DL (ref 6–20)
BUN/CREAT SERPL: 21.5 (ref 7–25)
CALCIUM SPEC-SCNC: 9.3 MG/DL (ref 8.6–10.5)
CHLORIDE SERPL-SCNC: 108 MMOL/L (ref 98–107)
CO2 SERPL-SCNC: 23.6 MMOL/L (ref 22–29)
CREAT SERPL-MCNC: 0.79 MG/DL (ref 0.57–1)
DEPRECATED RDW RBC AUTO: 42.1 FL (ref 37–54)
EGFRCR SERPLBLD CKD-EPI 2021: 100.2 ML/MIN/1.73
EOSINOPHIL # BLD AUTO: 0.05 10*3/MM3 (ref 0–0.4)
EOSINOPHIL NFR BLD AUTO: 0.6 % (ref 0.3–6.2)
ERYTHROCYTE [DISTWIDTH] IN BLOOD BY AUTOMATED COUNT: 12.2 % (ref 12.3–15.4)
GLOBULIN UR ELPH-MCNC: 2.2 GM/DL
GLUCOSE SERPL-MCNC: 100 MG/DL (ref 65–99)
HCG INTACT+B SERPL-ACNC: <1 MIU/ML
HCT VFR BLD AUTO: 41.7 % (ref 34–46.6)
HGB BLD-MCNC: 14.1 G/DL (ref 12–15.9)
IMM GRANULOCYTES # BLD AUTO: 0.03 10*3/MM3 (ref 0–0.05)
IMM GRANULOCYTES NFR BLD AUTO: 0.4 % (ref 0–0.5)
LYMPHOCYTES # BLD AUTO: 1.51 10*3/MM3 (ref 0.7–3.1)
LYMPHOCYTES NFR BLD AUTO: 18.6 % (ref 19.6–45.3)
MCH RBC QN AUTO: 31.7 PG (ref 26.6–33)
MCHC RBC AUTO-ENTMCNC: 33.8 G/DL (ref 31.5–35.7)
MCV RBC AUTO: 93.7 FL (ref 79–97)
MONOCYTES # BLD AUTO: 0.37 10*3/MM3 (ref 0.1–0.9)
MONOCYTES NFR BLD AUTO: 4.6 % (ref 5–12)
NEUTROPHILS NFR BLD AUTO: 6.11 10*3/MM3 (ref 1.7–7)
NEUTROPHILS NFR BLD AUTO: 75.4 % (ref 42.7–76)
NRBC BLD AUTO-RTO: 0 /100 WBC (ref 0–0.2)
PLATELET # BLD AUTO: 274 10*3/MM3 (ref 140–450)
PMV BLD AUTO: 9.8 FL (ref 6–12)
POTASSIUM SERPL-SCNC: 4.4 MMOL/L (ref 3.5–5.2)
PROT SERPL-MCNC: 6.8 G/DL (ref 6–8.5)
RBC # BLD AUTO: 4.45 10*6/MM3 (ref 3.77–5.28)
SODIUM SERPL-SCNC: 142 MMOL/L (ref 136–145)
WBC NRBC COR # BLD AUTO: 8.1 10*3/MM3 (ref 3.4–10.8)

## 2024-05-28 PROCEDURE — 85025 COMPLETE CBC W/AUTO DIFF WBC: CPT

## 2024-05-28 PROCEDURE — 84702 CHORIONIC GONADOTROPIN TEST: CPT

## 2024-05-28 PROCEDURE — 36415 COLL VENOUS BLD VENIPUNCTURE: CPT

## 2024-05-28 PROCEDURE — 80053 COMPREHEN METABOLIC PANEL: CPT

## 2024-05-28 RX ORDER — DIAZEPAM 10 MG/1
10 TABLET ORAL 3 TIMES DAILY PRN
Qty: 90 TABLET | Refills: 0 | Status: SHIPPED | OUTPATIENT
Start: 2024-06-03 | End: 2025-06-03

## 2024-05-28 NOTE — TELEPHONE ENCOUNTER
Patient left message on medline requesting refill on her Valium. She ask if provider could send it in on June 3rd one day early, stated she is having surgery on the 4th and won't be able to walk to pick it up because she will be down for a little while. Let patient know that I would ask provider and let her know.

## 2024-05-28 NOTE — DISCHARGE INSTRUCTIONS
Take the following medications the morning of surgery:      GABAPENTIN,  DIAZEPAM AND XZPREXA    If you are on prescription narcotic pain medication to control your pain you may also take that medication the morning of surgery.    General Instructions:  Do not eat solid food after midnight the night before surgery.  You may drink clear liquids day of surgery but must stop at least one hour before your hospital arrival time.  It is beneficial for you to have a clear drink that contains carbohydrates the day of surgery.  We suggest a 12 to 20 ounce bottle of Gatorade or Powerade for non-diabetic patients or a 12 to 20 ounce bottle of G2 or Powerade Zero for diabetic patients. (Pediatric patients, are not advised to drink a 12 to 20 ounce carbohydrate drink)    Clear liquids are liquids you can see through.  Nothing red in color.     Plain water                               Sports drinks  Sodas                                   Gelatin (Jell-O)  Fruit juices without pulp such as white grape juice and apple juice  Popsicles that contain no fruit or yogurt  Tea or coffee (no cream or milk added)  Gatorade / Powerade  G2 / Powerade Zero    Infants may have breast milk up to four hours before surgery.  Infants drinking formula may drink formula up to six hours before surgery.   Patients who avoid smoking, chewing tobacco and alcohol for 4 weeks prior to surgery have a reduced risk of post-operative complications.  Quit smoking as many days before surgery as you can.  Do not smoke, use chewing tobacco or drink alcohol the day of surgery.   If applicable bring your C-PAP/ BI-PAP machine in with you to preop day of surgery.  Bring any papers given to you in the doctor’s office.  Wear clean comfortable clothes.  Do not wear contact lenses, false eyelashes or make-up.  Bring a case for your glasses.   Bring crutches or walker if applicable.  Remove all piercings.  Leave jewelry and any other valuables at home.  Hair extensions  with metal clips must be removed prior to surgery.  The Pre-Admission Testing nurse will instruct you to bring medications if unable to obtain an accurate list in Pre-Admission Testing.            Preventing a Surgical Site Infection:  For 2 to 3 days before surgery, avoid shaving with a razor because the razor can irritate skin and make it easier to develop an infection.    Any areas of open skin can increase the risk of a post-operative wound infection by allowing bacteria to enter and travel throughout the body.  Notify your surgeon if you have any skin wounds / rashes even if it is not near the expected surgical site.  The area will need assessed to determine if surgery should be delayed until it is healed.  The night prior to surgery shower using a fresh bar of anti-bacterial soap (such as Dial) and clean washcloth.  Sleep in a clean bed with clean clothing.  Do not allow pets to sleep with you.  Shower on the morning of surgery using a fresh bar of anti-bacterial soap (such as Dial) and clean washcloth.  Dry with a clean towel and dress in clean clothing.  Ask your surgeon if you will be receiving antibiotics prior to surgery.  Make sure you, your family, and all healthcare providers clean their hands with soap and water or an alcohol based hand  before caring for you or your wound.    Day of surgery:  Your arrival time is approximately two hours before your scheduled surgery time.  Upon arrival, a Pre-op nurse and Anesthesiologist will review your health history, obtain vital signs, and answer questions you may have.  The only belongings needed at this time will be a list of your home medications and if applicable your C-PAP/BI-PAP machine.  A Pre-op nurse will start an IV and you may receive medication in preparation for surgery, including something to help you relax.     Please be aware that surgery does come with discomfort.  We want to make every effort to control your discomfort so please  discuss any uncontrolled symptoms with your nurse.   Your doctor will most likely have prescribed pain medications.      If you are going home after surgery you will receive individualized written care instructions before being discharged.  A responsible adult must drive you to and from the hospital on the day of your surgery and ideally stay with you through the night.   .  Discharge prescriptions can be filled by the hospital pharmacy during regular pharmacy hours.  If you are having surgery late in the day/evening your prescription may be e-prescribed to your pharmacy.  Please verify your pharmacy hours or chose a 24 hour pharmacy to avoid not having access to your prescription because your pharmacy has closed for the day.    If you are staying overnight following surgery, you will be transported to your hospital room following the recovery period.  [facility] has all private rooms.    If you have any questions please call Pre-Admission Testing at [phone].  Deductibles and co-payments are collected on the day of service. Please be prepared to pay the required co-pay, deductible or deposit on the day of service as defined by your plan.    Call your surgeon immediately if you experience any of the following symptoms:  Sore Throat  Shortness of Breath or difficulty breathing  Cough  Chills  Body soreness or muscle pain  Headache  Fever  New loss of taste or smell  Do not arrive for your surgery ill.  Your procedure will need to be rescheduled to another time.  You will need to call your physician before the day of surgery to avoid any unnecessary exposure to hospital staff as well as other patients.  CHLORHEXIDINE CLOTH INSTRUCTIONS  The morning of surgery follow these instructions using the Chlorhexidine cloths you've been given.  These steps reduce bacteria on the body.  Do not use the cloths near your eyes, ears mouth, genitalia or on open wounds.  Throw the cloths away after use but do not  try to flush them down a toilet.      Open and remove one cloth at a time from the package.    Leave the cloth unfolded and begin the bathing.  Massage the skin with the cloths using gentle pressure to remove bacteria.  Do not scrub harshly.   Follow the steps below with one 2% CHG cloth per area (6 total cloths).  One cloth for neck, shoulders and chest.  One cloth for both arms, hands, fingers and underarms (do underarms last).  One cloth for the abdomen followed by groin.  One cloth for right leg and foot including between the toes.  One cloth for left leg and foot including between the toes.  The last cloth is to be used for the back of the neck, back and buttocks.    Allow the CHG to air dry 3 minutes on the skin which will give it time to work and decrease the chance of irritation.  The skin may feel sticky until it is dry.  Do not rinse with water or any other liquid or you will lose the beneficial effects of the CHG.  If mild skin irritation occurs, do rinse the skin to remove the CHG.  Report this to the nurse at time of admission.  Do not apply lotions, creams, ointments, deodorants or perfumes after using the clothes. Dress in clean clothes before coming to the hospital.

## 2024-06-03 PROCEDURE — 58661 LAPAROSCOPY REMOVE ADNEXA: CPT | Performed by: OBSTETRICS & GYNECOLOGY

## 2024-06-03 PROCEDURE — S0260 H&P FOR SURGERY: HCPCS | Performed by: OBSTETRICS & GYNECOLOGY

## 2024-06-04 ENCOUNTER — ANESTHESIA (OUTPATIENT)
Dept: PERIOP | Facility: HOSPITAL | Age: 34
End: 2024-06-04
Payer: MEDICAID

## 2024-06-04 ENCOUNTER — ANESTHESIA EVENT (OUTPATIENT)
Dept: PERIOP | Facility: HOSPITAL | Age: 34
End: 2024-06-04
Payer: MEDICAID

## 2024-06-04 ENCOUNTER — HOSPITAL ENCOUNTER (OUTPATIENT)
Facility: HOSPITAL | Age: 34
Setting detail: HOSPITAL OUTPATIENT SURGERY
Discharge: HOME | End: 2024-06-04
Attending: OBSTETRICS & GYNECOLOGY | Admitting: OBSTETRICS & GYNECOLOGY
Payer: MEDICAID

## 2024-06-04 VITALS
OXYGEN SATURATION: 93 % | DIASTOLIC BLOOD PRESSURE: 75 MMHG | HEART RATE: 92 BPM | SYSTOLIC BLOOD PRESSURE: 126 MMHG | RESPIRATION RATE: 16 BRPM | TEMPERATURE: 97.6 F

## 2024-06-04 DIAGNOSIS — Z30.2 ENCOUNTER FOR STERILIZATION: Primary | ICD-10-CM

## 2024-06-04 DIAGNOSIS — Z30.2 REQUEST FOR STERILIZATION: ICD-10-CM

## 2024-06-04 PROCEDURE — 25010000002 PROPOFOL 10 MG/ML EMULSION: Performed by: ANESTHESIOLOGY

## 2024-06-04 PROCEDURE — 25010000002 ONDANSETRON PER 1 MG: Performed by: ANESTHESIOLOGY

## 2024-06-04 PROCEDURE — 25010000002 FENTANYL CITRATE (PF) 100 MCG/2ML SOLUTION: Performed by: ANESTHESIOLOGY

## 2024-06-04 PROCEDURE — 25810000003 LACTATED RINGERS PER 1000 ML: Performed by: ANESTHESIOLOGY

## 2024-06-04 PROCEDURE — 25010000002 MIDAZOLAM PER 1 MG: Performed by: STUDENT IN AN ORGANIZED HEALTH CARE EDUCATION/TRAINING PROGRAM

## 2024-06-04 PROCEDURE — 25810000003 LACTATED RINGERS PER 1000 ML: Performed by: OBSTETRICS & GYNECOLOGY

## 2024-06-04 PROCEDURE — 88302 TISSUE EXAM BY PATHOLOGIST: CPT | Performed by: OBSTETRICS & GYNECOLOGY

## 2024-06-04 PROCEDURE — 25010000002 SUGAMMADEX 200 MG/2ML SOLUTION: Performed by: ANESTHESIOLOGY

## 2024-06-04 PROCEDURE — 25010000002 FENTANYL CITRATE (PF) 50 MCG/ML SOLUTION: Performed by: ANESTHESIOLOGY

## 2024-06-04 PROCEDURE — 81025 URINE PREGNANCY TEST: CPT | Performed by: OBSTETRICS & GYNECOLOGY

## 2024-06-04 PROCEDURE — 25010000002 CEFAZOLIN PER 500 MG: Performed by: OBSTETRICS & GYNECOLOGY

## 2024-06-04 PROCEDURE — 25010000002 KETOROLAC TROMETHAMINE PER 15 MG: Performed by: ANESTHESIOLOGY

## 2024-06-04 PROCEDURE — 25010000002 HYDROMORPHONE PER 4 MG: Performed by: ANESTHESIOLOGY

## 2024-06-04 PROCEDURE — 58661 LAPAROSCOPY REMOVE ADNEXA: CPT | Performed by: OBSTETRICS & GYNECOLOGY

## 2024-06-04 PROCEDURE — 25010000002 DEXAMETHASONE SODIUM PHOSPHATE 20 MG/5ML SOLUTION: Performed by: ANESTHESIOLOGY

## 2024-06-04 RX ORDER — PROMETHAZINE HYDROCHLORIDE 25 MG/1
25 SUPPOSITORY RECTAL ONCE AS NEEDED
Status: DISCONTINUED | OUTPATIENT
Start: 2024-06-04 | End: 2024-06-04 | Stop reason: HOSPADM

## 2024-06-04 RX ORDER — PROPOFOL 10 MG/ML
VIAL (ML) INTRAVENOUS AS NEEDED
Status: DISCONTINUED | OUTPATIENT
Start: 2024-06-04 | End: 2024-06-04 | Stop reason: SURG

## 2024-06-04 RX ORDER — NALOXONE HYDROCHLORIDE 4 MG/.1ML
SPRAY NASAL
Qty: 2 EACH | Refills: 0 | Status: SHIPPED | OUTPATIENT
Start: 2024-06-04

## 2024-06-04 RX ORDER — DOCUSATE SODIUM 100 MG/1
100 CAPSULE, LIQUID FILLED ORAL 2 TIMES DAILY
Qty: 60 CAPSULE | Refills: 1 | Status: SHIPPED | OUTPATIENT
Start: 2024-06-04

## 2024-06-04 RX ORDER — SODIUM CHLORIDE 9 MG/ML
40 INJECTION, SOLUTION INTRAVENOUS AS NEEDED
Status: DISCONTINUED | OUTPATIENT
Start: 2024-06-04 | End: 2024-06-04 | Stop reason: HOSPADM

## 2024-06-04 RX ORDER — PROMETHAZINE HYDROCHLORIDE 25 MG/1
25 TABLET ORAL ONCE AS NEEDED
Status: DISCONTINUED | OUTPATIENT
Start: 2024-06-04 | End: 2024-06-04 | Stop reason: HOSPADM

## 2024-06-04 RX ORDER — NALOXONE HCL 0.4 MG/ML
0.2 VIAL (ML) INJECTION AS NEEDED
Status: DISCONTINUED | OUTPATIENT
Start: 2024-06-04 | End: 2024-06-04 | Stop reason: HOSPADM

## 2024-06-04 RX ORDER — EPHEDRINE SULFATE 50 MG/ML
5 INJECTION, SOLUTION INTRAVENOUS ONCE AS NEEDED
Status: DISCONTINUED | OUTPATIENT
Start: 2024-06-04 | End: 2024-06-04 | Stop reason: HOSPADM

## 2024-06-04 RX ORDER — SODIUM CHLORIDE, SODIUM LACTATE, POTASSIUM CHLORIDE, CALCIUM CHLORIDE 600; 310; 30; 20 MG/100ML; MG/100ML; MG/100ML; MG/100ML
INJECTION, SOLUTION INTRAVENOUS CONTINUOUS PRN
Status: DISCONTINUED | OUTPATIENT
Start: 2024-06-04 | End: 2024-06-04 | Stop reason: SURG

## 2024-06-04 RX ORDER — LIDOCAINE HYDROCHLORIDE 20 MG/ML
INJECTION, SOLUTION INFILTRATION; PERINEURAL AS NEEDED
Status: DISCONTINUED | OUTPATIENT
Start: 2024-06-04 | End: 2024-06-04 | Stop reason: SURG

## 2024-06-04 RX ORDER — IPRATROPIUM BROMIDE AND ALBUTEROL SULFATE 2.5; .5 MG/3ML; MG/3ML
3 SOLUTION RESPIRATORY (INHALATION) ONCE AS NEEDED
Status: DISCONTINUED | OUTPATIENT
Start: 2024-06-04 | End: 2024-06-04 | Stop reason: HOSPADM

## 2024-06-04 RX ORDER — ROCURONIUM BROMIDE 10 MG/ML
INJECTION, SOLUTION INTRAVENOUS AS NEEDED
Status: DISCONTINUED | OUTPATIENT
Start: 2024-06-04 | End: 2024-06-04 | Stop reason: SURG

## 2024-06-04 RX ORDER — SODIUM CHLORIDE 0.9 % (FLUSH) 0.9 %
10 SYRINGE (ML) INJECTION EVERY 12 HOURS SCHEDULED
Status: DISCONTINUED | OUTPATIENT
Start: 2024-06-04 | End: 2024-06-04 | Stop reason: HOSPADM

## 2024-06-04 RX ORDER — ONDANSETRON 2 MG/ML
4 INJECTION INTRAMUSCULAR; INTRAVENOUS ONCE AS NEEDED
Status: COMPLETED | OUTPATIENT
Start: 2024-06-04 | End: 2024-06-04

## 2024-06-04 RX ORDER — SODIUM CHLORIDE, SODIUM LACTATE, POTASSIUM CHLORIDE, CALCIUM CHLORIDE 600; 310; 30; 20 MG/100ML; MG/100ML; MG/100ML; MG/100ML
1000 INJECTION, SOLUTION INTRAVENOUS CONTINUOUS
Status: DISCONTINUED | OUTPATIENT
Start: 2024-06-04 | End: 2024-06-04 | Stop reason: HOSPADM

## 2024-06-04 RX ORDER — ONDANSETRON 2 MG/ML
INJECTION INTRAMUSCULAR; INTRAVENOUS AS NEEDED
Status: DISCONTINUED | OUTPATIENT
Start: 2024-06-04 | End: 2024-06-04 | Stop reason: SURG

## 2024-06-04 RX ORDER — DIPHENHYDRAMINE HYDROCHLORIDE 50 MG/ML
12.5 INJECTION INTRAMUSCULAR; INTRAVENOUS
Status: DISCONTINUED | OUTPATIENT
Start: 2024-06-04 | End: 2024-06-04 | Stop reason: HOSPADM

## 2024-06-04 RX ORDER — SODIUM CHLORIDE 0.9 % (FLUSH) 0.9 %
10 SYRINGE (ML) INJECTION AS NEEDED
Status: DISCONTINUED | OUTPATIENT
Start: 2024-06-04 | End: 2024-06-04 | Stop reason: HOSPADM

## 2024-06-04 RX ORDER — FENTANYL CITRATE 50 UG/ML
50 INJECTION, SOLUTION INTRAMUSCULAR; INTRAVENOUS
Status: DISCONTINUED | OUTPATIENT
Start: 2024-06-04 | End: 2024-06-04 | Stop reason: HOSPADM

## 2024-06-04 RX ORDER — FLUMAZENIL 0.1 MG/ML
0.2 INJECTION INTRAVENOUS AS NEEDED
Status: DISCONTINUED | OUTPATIENT
Start: 2024-06-04 | End: 2024-06-04 | Stop reason: HOSPADM

## 2024-06-04 RX ORDER — MIDAZOLAM HYDROCHLORIDE 1 MG/ML
1 INJECTION INTRAMUSCULAR; INTRAVENOUS ONCE AS NEEDED
Status: DISCONTINUED | OUTPATIENT
Start: 2024-06-04 | End: 2024-06-04 | Stop reason: HOSPADM

## 2024-06-04 RX ORDER — OXYCODONE HYDROCHLORIDE AND ACETAMINOPHEN 5; 325 MG/1; MG/1
1 TABLET ORAL EVERY 6 HOURS PRN
Qty: 12 TABLET | Refills: 0 | Status: SHIPPED | OUTPATIENT
Start: 2024-06-04 | End: 2024-06-07

## 2024-06-04 RX ORDER — OXYCODONE AND ACETAMINOPHEN 7.5; 325 MG/1; MG/1
1 TABLET ORAL EVERY 4 HOURS PRN
Status: DISCONTINUED | OUTPATIENT
Start: 2024-06-04 | End: 2024-06-04 | Stop reason: HOSPADM

## 2024-06-04 RX ORDER — BUPIVACAINE HYDROCHLORIDE AND EPINEPHRINE 2.5; 5 MG/ML; UG/ML
INJECTION, SOLUTION EPIDURAL; INFILTRATION; INTRACAUDAL; PERINEURAL AS NEEDED
Status: DISCONTINUED | OUTPATIENT
Start: 2024-06-04 | End: 2024-06-04 | Stop reason: HOSPADM

## 2024-06-04 RX ORDER — HYDROMORPHONE HYDROCHLORIDE 1 MG/ML
0.5 INJECTION, SOLUTION INTRAMUSCULAR; INTRAVENOUS; SUBCUTANEOUS
Status: DISCONTINUED | OUTPATIENT
Start: 2024-06-04 | End: 2024-06-04 | Stop reason: HOSPADM

## 2024-06-04 RX ORDER — FENTANYL CITRATE 50 UG/ML
INJECTION, SOLUTION INTRAMUSCULAR; INTRAVENOUS AS NEEDED
Status: DISCONTINUED | OUTPATIENT
Start: 2024-06-04 | End: 2024-06-04 | Stop reason: SURG

## 2024-06-04 RX ORDER — HYDROCODONE BITARTRATE AND ACETAMINOPHEN 5; 325 MG/1; MG/1
1 TABLET ORAL ONCE AS NEEDED
Status: DISCONTINUED | OUTPATIENT
Start: 2024-06-04 | End: 2024-06-04 | Stop reason: HOSPADM

## 2024-06-04 RX ORDER — LIDOCAINE HYDROCHLORIDE 10 MG/ML
0.5 INJECTION, SOLUTION INFILTRATION; PERINEURAL ONCE AS NEEDED
Status: DISCONTINUED | OUTPATIENT
Start: 2024-06-04 | End: 2024-06-04 | Stop reason: HOSPADM

## 2024-06-04 RX ORDER — DEXAMETHASONE SODIUM PHOSPHATE 4 MG/ML
INJECTION, SOLUTION INTRA-ARTICULAR; INTRALESIONAL; INTRAMUSCULAR; INTRAVENOUS; SOFT TISSUE AS NEEDED
Status: DISCONTINUED | OUTPATIENT
Start: 2024-06-04 | End: 2024-06-04 | Stop reason: SURG

## 2024-06-04 RX ORDER — KETOROLAC TROMETHAMINE 30 MG/ML
INJECTION, SOLUTION INTRAMUSCULAR; INTRAVENOUS AS NEEDED
Status: DISCONTINUED | OUTPATIENT
Start: 2024-06-04 | End: 2024-06-04 | Stop reason: SURG

## 2024-06-04 RX ORDER — SCOLOPAMINE TRANSDERMAL SYSTEM 1 MG/1
1 PATCH, EXTENDED RELEASE TRANSDERMAL ONCE
Status: DISCONTINUED | OUTPATIENT
Start: 2024-06-04 | End: 2024-06-04 | Stop reason: HOSPADM

## 2024-06-04 RX ORDER — HYDRALAZINE HYDROCHLORIDE 20 MG/ML
5 INJECTION INTRAMUSCULAR; INTRAVENOUS
Status: DISCONTINUED | OUTPATIENT
Start: 2024-06-04 | End: 2024-06-04 | Stop reason: HOSPADM

## 2024-06-04 RX ORDER — LABETALOL HYDROCHLORIDE 5 MG/ML
5 INJECTION, SOLUTION INTRAVENOUS
Status: DISCONTINUED | OUTPATIENT
Start: 2024-06-04 | End: 2024-06-04 | Stop reason: HOSPADM

## 2024-06-04 RX ADMIN — ROCURONIUM BROMIDE 50 MG: 10 INJECTION, SOLUTION INTRAVENOUS at 08:37

## 2024-06-04 RX ADMIN — SODIUM CHLORIDE, POTASSIUM CHLORIDE, SODIUM LACTATE AND CALCIUM CHLORIDE 1000 ML: 600; 310; 30; 20 INJECTION, SOLUTION INTRAVENOUS at 07:35

## 2024-06-04 RX ADMIN — SCOPALAMINE 1 PATCH: 1 PATCH, EXTENDED RELEASE TRANSDERMAL at 07:35

## 2024-06-04 RX ADMIN — SUGAMMADEX 200 MG: 100 INJECTION, SOLUTION INTRAVENOUS at 09:45

## 2024-06-04 RX ADMIN — ONDANSETRON 4 MG: 2 INJECTION INTRAMUSCULAR; INTRAVENOUS at 10:19

## 2024-06-04 RX ADMIN — DEXAMETHASONE SODIUM PHOSPHATE 8 MG: 4 INJECTION, SOLUTION INTRAMUSCULAR; INTRAVENOUS at 08:48

## 2024-06-04 RX ADMIN — KETOROLAC TROMETHAMINE 30 MG: 30 INJECTION, SOLUTION INTRAMUSCULAR at 09:45

## 2024-06-04 RX ADMIN — OXYCODONE AND ACETAMINOPHEN 1 TABLET: 7.5; 325 TABLET ORAL at 10:19

## 2024-06-04 RX ADMIN — SODIUM CHLORIDE, POTASSIUM CHLORIDE, SODIUM LACTATE AND CALCIUM CHLORIDE: 600; 310; 30; 20 INJECTION, SOLUTION INTRAVENOUS at 08:36

## 2024-06-04 RX ADMIN — FENTANYL CITRATE 50 MCG: 50 INJECTION, SOLUTION INTRAMUSCULAR; INTRAVENOUS at 08:37

## 2024-06-04 RX ADMIN — MIDAZOLAM 1 MG: 1 INJECTION INTRAMUSCULAR; INTRAVENOUS at 07:35

## 2024-06-04 RX ADMIN — PROPOFOL 150 MG: 10 INJECTION, EMULSION INTRAVENOUS at 08:37

## 2024-06-04 RX ADMIN — HYDROMORPHONE HYDROCHLORIDE 0.5 MG: 1 INJECTION, SOLUTION INTRAMUSCULAR; INTRAVENOUS; SUBCUTANEOUS at 10:19

## 2024-06-04 RX ADMIN — CEFAZOLIN 2000 MG: 2 INJECTION, POWDER, FOR SOLUTION INTRAVENOUS at 08:23

## 2024-06-04 RX ADMIN — FENTANYL CITRATE 50 MCG: 50 INJECTION, SOLUTION INTRAMUSCULAR; INTRAVENOUS at 09:59

## 2024-06-04 RX ADMIN — ONDANSETRON 4 MG: 2 INJECTION INTRAMUSCULAR; INTRAVENOUS at 08:48

## 2024-06-04 RX ADMIN — LIDOCAINE HYDROCHLORIDE 100 MG: 20 INJECTION, SOLUTION INFILTRATION; PERINEURAL at 08:37

## 2024-06-04 NOTE — DISCHARGE INSTRUCTIONS
YOU WILL BE ON PELVIC REST FOR THE NEXT 2 WEEKS OR UNTIL SPECIFIED BY YOUR PHYSICIAN. PELVIC REST INCLUDES:  Avoiding long periods of standing.  Avoiding heavy lifting, pushing or pulling.  DO NOT lift anything heavier than 10 pounds (4.5 kg)  DO NOT douche, use tampons, or have sex (intercourse) for 2 weeks after the procedure.      Scopolamine Patch  This patch has been applied to the skin behind one of your ears. It may stay in place up to 24 hours. You may remove it at any time after your surgery; however, it should be removed after you are up and walking around the next day.  This medicine reduces stomach upset. Side effects may include: dry mouth, dizziness, sleepiness, constipation, or upset stomach.  An allergy would show up as: a rash, itching, wheezing or shortness of breath.  Follow these instructions:  Do not drink alcohol, drive or operate machinery while taking this medicine.  Wear only 1 patch at a time. You can leave the patch on for up to 24 hours.  When you remove the patch, fold it in half with the sticky sides together and throw it away. Wash your hands and the area under the patch.  Do not touch your eye with your hand if it has touched the patch.  Wash your hands well before and after touching the patch.  Sit or stand slowly to avoid dizziness.  Call your doctor if you have:  Any sign of allergy  No relief  Trouble passing urine  Any new or severe symptoms

## 2024-06-04 NOTE — ANESTHESIA POSTPROCEDURE EVALUATION
Patient: Annmarie Perera    Procedure Summary       Date: 06/04/24 Room / Location: [facility] OR [address] [place] [address] MAIN OR    Anesthesia Start: 0829 Anesthesia Stop: 1000    Procedure: SALPINGECTOMY LAPAROSCOPIC (Bilateral: Abdomen) Diagnosis:       Request for sterilization      (Request for sterilization [Z30.2])    Surgeons: Mima Larsen MD Provider: Darrell Grimes MD    Anesthesia Type: general ASA Status: 2            Anesthesia Type: general    Vitals  Vitals Value Taken Time   /79 06/04/24 1030   Temp 36.4 °C (97.6 °F) 06/04/24 0955   Pulse 91 06/04/24 1038   Resp 16 06/04/24 1030   SpO2 99 % 06/04/24 1038   Vitals shown include unfiled device data.        Post Anesthesia Care and Evaluation    Patient location during evaluation: bedside  Patient participation: complete - patient participated  Level of consciousness: awake and alert  Pain management: adequate    Airway patency: patent  Anesthetic complications: No anesthetic complications  PONV Status: controlled  Cardiovascular status: acceptable and hemodynamically stable  Respiratory status: acceptable, spontaneous ventilation and nonlabored ventilation  Hydration status: acceptable    Comments: /75   Pulse 92   Temp 36.4 °C (97.6 °F) (Oral)   Resp 16   SpO2 95%

## 2024-06-04 NOTE — H&P
Patient Care Team:  Meg Huff APRN as PCP - General (Nurse Practitioner)  Batool Carter PA-C as Physician Assistant (Physician Assistant)    Chief complaint undesired fertility    Subjective     Patient is a 33 y.o. female G 3 P 3003 is scheduled for bilateral laparoscopic salpingectomy for sterilization. She understands permanence of procedure, 1/200 risk of failure and increased risk of ectopic pregnancy following surgery. She understands risks of surgery to include bleeding, transfusion, infection, damage to internal organs, need for additional surgeries due to complications or pathology, DVT/PE and death.  She has recently received depo-provera injection on 4/11/24 for contraception and is aware she could continue this method or pursue long acting contraception. She has considered but desires permanent sterilization. She signed a consent for sterilization on 3/12/24. Patient has a medical history significant for paranoid schizophrenia. She is receiving psychiatric care. Her provider, Batool Carter PA-C has provided a letter advising patient is able to make competent decisions for herself and would benefit from permanent sterilization.     Review of Systems  Review of Systems - General ROS: negative for - chills or fever  Respiratory ROS: negative for - cough or shortness of breath  Cardiovascular ROS: negative for - chest pain  Gastrointestinal ROS: negative for - abdominal pain or nausea/vomiting  Genito-Urinary ROS: positive for - light vaginal spotting on depo provera  negative for - pelvic pain  Neurological ROS: negative for - severe headache     History  Past Medical History:   Diagnosis Date    Abdominal pain, epigastric 06/20/2017    Borderline personality disorder     Chronic pain syndrome 09/11/2017    Contraceptive management 01/23/2018    Dysuria 11/30/2017    Genital herpes 11/20/2017    High risk sexual behavior 11/20/2017    HPV (human papilloma virus) anogenital infection      Irregular menstruation 2017    refer to OB/GYN for further management 2017    Migraine     Panic disorder     PONV (postoperative nausea and vomiting)     Poor compliance with medication 2019    Positive skin test for tuberculosis     Tuberculosis positive. STATES CXR NORMAL NEVER TREATED WITH MEDS    Pyelonephritis, acute 2017    Improved 2017    Right knee pain 2018    Discussed symptomatic treatment, activity as tolerate. 2018    Schizoaffective disorder     Schizophrenia, paranoid 2016    deteriorated 04-    Substance abuse     STATES LAST USED     Syncope     Trichotillomania 2016    Vaginitis 2017    Withdrawal symptoms, drug or narcotic      Past Surgical History:   Procedure Laterality Date    CERVICAL BIOPSY  W/ LOOP ELECTRODE EXCISION      [facility]AP SMEAR      [facility]     Family History   Problem Relation Age of Onset    Other Mother         Tumor    Breast cancer Maternal Aunt     Breast cancer Maternal Grandmother     Ovarian cancer Neg Hx     Uterine cancer Neg Hx     Colon cancer Neg Hx     Malig Hyperthermia Neg Hx      Social History     Tobacco Use    Smoking status: Every Day     Current packs/day: 2.00     Average packs/day: 2.0 packs/day for 15.0 years (30.0 ttl pk-yrs)     Types: Cigarettes    Smokeless tobacco: Never    Tobacco comments:     Passive Smoke: Y   Vaping Use    Vaping status: Former    Substances: Flavoring    Devices: Disposable, Pre-filled pod   Substance Use Topics    Alcohol use: No    Drug use: Not Currently     Types: Cocaine(coke), Benzodiazepines, Oxycodone, Hydrocodone, Marijuana     Comment: 9 years sober     Allergies:  Morphine, Haloperidol, and Aluminum silicate    OB History    Para Term  AB Living   3 3 3     3   SAB IAB Ectopic Molar Multiple Live Births             3      # Outcome Date GA Lbr David/2nd Weight Sex Type Anes PTL Lv   3 Term 10/25/18     F Vag-Spont None  JENNIFER   2 Term 08/15/15 41w0d  3629 g (8 lb) M Vag-Spont EPI N JENNIFER   1 Term 07/21/07 39w0d  3629 g (8 lb) M Vag-Spont EPI N JENNIFER          Current Facility-Administered Medications:     ceFAZolin 2000 mg IVPB in 100 mL NS (MBP), 2,000 mg, Intravenous, Once, Mima Larsen MD    lactated ringers infusion 1,000 mL, 1,000 mL, Intravenous, Continuous, Mima Larsen MD, Last Rate: 25 mL/hr at 06/04/24 0735, 1,000 mL at 06/04/24 0735    lidocaine (XYLOCAINE) 1 % injection 0.5 mL, 0.5 mL, Intradermal, Once PRN, Mima Larsen MD    midazolam (VERSED) injection 1 mg, 1 mg, Intravenous, Once PRN, Donn Cruz MD, 1 mg at 06/04/24 0735    scopolamine patch 1 mg/72 hr, 1 patch, Transdermal, Once, Donn Cruz MD, 1 patch at 06/04/24 0735    sodium chloride 0.9 % flush 10 mL, 10 mL, Intravenous, Q12H, Mima Larsen MD    sodium chloride 0.9 % flush 10 mL, 10 mL, Intravenous, PRN, Mima Larsen MD    sodium chloride 0.9 % flush 10 mL, 10 mL, Intravenous, PRN, Mima Larsen MD    sodium chloride 0.9 % infusion 40 mL, 40 mL, Intravenous, PRN, Mima Larsen MD     Objective     Vital Signs  Temp:  [98.5 °F (36.9 °C)] 98.5 °F (36.9 °C)  Heart Rate:  [93] 93  Resp:  [20] 20  BP: (131)/(74) 131/74    Physical Exam:    General Appearance: alert, appears stated age, and cooperative  Lungs: clear to auscultation, respirations regular, respirations even, and respirations unlabored  Heart: regular rhythm & normal rate  Abdomen: soft non-tender, no guarding, and no rebound tenderness  Extremities: moves extremities well and no edema  Neurologic: Mental Status orientated to person, place, time and situation  Psych: normal  : def to OR        Assessment & Plan       Request for sterilization    Procedure reviewed with patient and she desires to proceed. She understands the permanence of sterilization, 1/200 risk of failure and increased risk of ectopic pregnancy  following surgery. She understands the surgical risks of procedure and desires to proceed.        Mima Larsen MD  06/04/24  07:48 EDT

## 2024-06-04 NOTE — ANESTHESIA PROCEDURE NOTES
Airway  Urgency: elective    Date/Time: 6/4/2024 8:41 AM    General Information and Staff    Patient location during procedure: OR  Anesthesiologist: Darrell Grimes MD    Indications and Patient Condition  Indications for airway management: airway protection    Preoxygenated: yes  MILS maintained throughout  Mask difficulty assessment: 1 - vent by mask    Final Airway Details  Final airway type: endotracheal airway      Successful airway: ETT  Cuffed: yes   Successful intubation technique: direct laryngoscopy  Endotracheal tube insertion site: oral  Blade: Jordi  Blade size: 3  ETT size (mm): 7.0  Cormack-Lehane Classification: grade I - full view of glottis  Placement verified by: chest auscultation   Number of attempts at approach: 1  Assessment: lips, teeth, and gum same as pre-op and atraumatic intubation

## 2024-06-04 NOTE — ANESTHESIA PREPROCEDURE EVALUATION
Anesthesia Evaluation     Patient summary reviewed and Nursing notes reviewed   history of anesthetic complications:  PONV  NPO Solid Status: > 8 hours  NPO Liquid Status: > 2 hours           Airway   Mallampati: II  TM distance: >3 FB  Neck ROM: full  Dental    (+) edentulous    Pulmonary    (+) a smoker Current,  Cardiovascular - negative cardio ROS        Neuro/Psych  (+) headaches, psychiatric history Schizophrenia  GI/Hepatic/Renal/Endo      Musculoskeletal (-) negative ROS    Abdominal    Substance History       Comment: H/o drug abuse years ago   OB/GYN negative ob/gyn ROS         Other - negative ROS                     Anesthesia Plan    ASA 2     general     intravenous induction     Anesthetic plan, risks, benefits, and alternatives have been provided, discussed and informed consent has been obtained with: patient.      CODE STATUS:

## 2024-06-04 NOTE — OP NOTE
Subjective     Date of Service:  06/04/24  Time of Service:  10:06 EDT    Surgical Staff: Surgeons and Role:     * Mima Larsen MD - Primary     * Haroon Brooke MD - Assisting       Pre-operative diagnosis(es): Pre-Op Diagnosis Codes:     * Request for sterilization [Z30.2]     Post-operative diagnosis(es): Post-Op Diagnosis Codes:     * Request for sterilization [Z30.2]   Procedure(s): Procedure(s):  SALPINGECTOMY LAPAROSCOPIC     Antibiotics: cefazolin (Ancef) ordered on call to OR     Anesthesia: Type: General  ASA:  II     Objective      Operative findings: Normal uterus, fallopian tubes and ovaries.    Specimens removed: ID Type Source Tests Collected by Time   A :  Tissue Fallopian Tubes, Bilateral TISSUE PATHOLOGY EXAM Mima Larsen MD 6/4/2024 0909          Output: Documented Output  Est. Blood Loss 20mL               Drains: [REMOVED] Urethral Catheter Non-latex 16 Fr. (Removed)          Complications: none   Intraoperative consult(s):    Condition: stable   Disposition:            Procedure: The patient was taken to the operating room where general anesthesia was obtained without difficulty. She was prepped and draped in the usual sterile fashion in the dorsal lithotomy position in [facility]. A surgical time out was performed. The weighted speculum was placed in the vagina and the anterior lip of the uterus was grasped with the tenaculum. The uterine manipulator was advanced through the cervix without difficulty. The tenaculum and speculum were removed. Gloves were changed and attention was turned to the abdomen. A 12 mm infraumbilical skin incision was made with the scalpel. The fascia was identified and opened sharply. The peritoneum was opened bluntly and the Favio trocar was advanced into the abdomen with direct visualization. The abdomen was insufflated with CO2 to maintain a pressure of 15 mm of Hg. The abdomen was surveyed and no abnormalities were noted. The uterus, fallopian  tubes and ovaries appeared normal. Two 5 mm ports were placed in the right and left abdomen lateral to the umbilicus with direct visualization. The right fallopian tube was followed out to the fimbriated end. It was excised using the Harmonic scalpel without difficulty. The excision site was hemostatic. The fallopian tube was sent for pathologic evaluation. The left fallopian tube was followed out to the fimbriated end. It was excised using the Harmonic scalpel without difficulty. The excision site was hemostatic. The fallopian tube was sent for pathologic evaluation. The excision sites were re-inspected and hemostasis was confirmed. The 5 mm ports were removed with direct visualization. The Favio was removed and the fascial incision was closed with a running stitch of 0 vicryl. The incisions were closed in a subcuticular fashion with 4-0 vicryl. Sterile dressings were placed. After the draped were removed, an approximately 3 cm area of skin irritation was noted around an adhesive site where the sterile drape was applied. This appeared consistent with tape reaction. Topical antibacterial cream was applied and a sterile dressing was placed at the site.     The hankins catheter and uterine manipulator were removed. The cervix was hemostatic. The patient tolerated the procedure well. All counts were correct. She was transferred to the recovery room in sterile fasion.           Mima Larsen MD  06/04/24  10:06 EDT

## 2024-06-05 ENCOUNTER — TELEPHONE (OUTPATIENT)
Dept: OBSTETRICS AND GYNECOLOGY | Age: 34
End: 2024-06-05
Payer: MEDICAID

## 2024-06-05 LAB
LAB AP CASE REPORT: NORMAL
PATH REPORT.FINAL DX SPEC: NORMAL
PATH REPORT.GROSS SPEC: NORMAL

## 2024-06-05 NOTE — TELEPHONE ENCOUNTER
Please call pt, her irritation is likely due to the vaginal prep and this should improve over the next 2 days. Would hold rx for yeast as she did not have evidence of yeast infection on exam. Please advise her I am not able to send pain prescriptions beyond 3 days after surgery per state regulations. Would recommend she wean off percocet over the next days and change to motrin with plan tylenol.

## 2024-06-05 NOTE — TELEPHONE ENCOUNTER
PT said she thinks she has a yeast infection per having surgery yesterday and wants to know if meds can be sent in. PT also asking for a PA per pain meds, she had to pay for them yesterday. She said the pharmacy needed a PA. PT also asking for pain meds before the weekend due to being in extreme pain.

## 2024-06-07 ENCOUNTER — TELEPHONE (OUTPATIENT)
Dept: OBSTETRICS AND GYNECOLOGY | Age: 34
End: 2024-06-07
Payer: MEDICAID

## 2024-06-07 NOTE — TELEPHONE ENCOUNTER
Called pt to check on her after surgery. She is sore but doing well overall. She has completed her percocet rx. Discussed she can take tylenol and motrin as needed. Offered to send rx and she declined at this time. She notes normal bowel and bladder function. Plan f/u as scheduled or prn.

## 2024-06-10 ENCOUNTER — TELEPHONE (OUTPATIENT)
Dept: OBSTETRICS AND GYNECOLOGY | Age: 34
End: 2024-06-10
Payer: MEDICAID

## 2024-06-10 ENCOUNTER — TELEPHONE (OUTPATIENT)
Dept: PSYCHIATRY | Facility: CLINIC | Age: 34
End: 2024-06-10
Payer: MEDICAID

## 2024-06-10 NOTE — TELEPHONE ENCOUNTER
Pt states that the diazepam is working for the most part but it is wearing off in the middle of the night and she is having panic attacks. Pt states that she tried the hydroxyzine and it did not help.     Pt wanted to make provider aware that she has been using her breathing exercises for her anger and it is helping and she is listening to music and it calms her down as well.

## 2024-06-10 NOTE — TELEPHONE ENCOUNTER
Pt called stating she had surgery on 06/04/2024 and has not had a bowel movement since.  She states this is not uncommon for her to go this long and she is not uncomfortable but she wanted to let Dr. Larsen know.  Pt states she took her colace the first couple days and will start taking it again.  She states she was passing a lot of gas the day of and the day after her procedure.  Pt encouraged to stay well hydrated and understanding verbalized.

## 2024-06-11 RX ORDER — CLONIDINE HYDROCHLORIDE 0.1 MG/1
0.1 TABLET ORAL 2 TIMES DAILY PRN
Qty: 60 TABLET | Refills: 0 | Status: SHIPPED | OUTPATIENT
Start: 2024-06-11

## 2024-06-11 NOTE — TELEPHONE ENCOUNTER
Spoke with patient and she was able to have several bowel movements. Will call back if any other issues happen

## 2024-06-11 NOTE — TELEPHONE ENCOUNTER
Please call Annmarie, the pain medication she was taking can worsen constipation. She can try milk of magnesia or miralax if she desires.

## 2024-06-11 NOTE — TELEPHONE ENCOUNTER
I am proud of her for working on self soothing techniques!    If the hydroxyzine is not working, she can d/c it and I will send an rx of clonidine to try twice daily as needed instead.

## 2024-06-11 NOTE — TELEPHONE ENCOUNTER
Pt called back and wanted to make provider aware that the clonidine doesn't work for her she has tried it before. Pt states that the hydroxyzine doesn't help with the anxiety but it does help her sleep and she would like to continue it.

## 2024-06-12 NOTE — TELEPHONE ENCOUNTER
Called pharmacy per providers request and cancelled the Clonidine that was sent in to [facility] on 06/11/2024.

## 2024-06-17 RX ORDER — HYDROXYZINE PAMOATE 50 MG/1
50 CAPSULE ORAL 3 TIMES DAILY PRN
Qty: 90 CAPSULE | Refills: 2 | OUTPATIENT
Start: 2024-06-17

## 2024-06-18 ENCOUNTER — OFFICE VISIT (OUTPATIENT)
Dept: OBSTETRICS AND GYNECOLOGY | Age: 34
End: 2024-06-18
Payer: MEDICAID

## 2024-06-18 VITALS
WEIGHT: 204.2 LBS | DIASTOLIC BLOOD PRESSURE: 74 MMHG | SYSTOLIC BLOOD PRESSURE: 122 MMHG | HEIGHT: 66 IN | BODY MASS INDEX: 32.82 KG/M2

## 2024-06-18 DIAGNOSIS — Z30.2 ENCOUNTER FOR STERILIZATION: Primary | ICD-10-CM

## 2024-06-18 DIAGNOSIS — Z09 POSTOP CHECK: ICD-10-CM

## 2024-06-18 PROBLEM — Z30.9 CONTRACEPTIVE MANAGEMENT: Status: RESOLVED | Noted: 2018-01-23 | Resolved: 2024-06-18

## 2024-06-18 PROCEDURE — 99024 POSTOP FOLLOW-UP VISIT: CPT | Performed by: OBSTETRICS & GYNECOLOGY

## 2024-06-18 PROCEDURE — 1159F MED LIST DOCD IN RCRD: CPT | Performed by: OBSTETRICS & GYNECOLOGY

## 2024-06-18 PROCEDURE — 1160F RVW MEDS BY RX/DR IN RCRD: CPT | Performed by: OBSTETRICS & GYNECOLOGY

## 2024-06-18 NOTE — PROGRESS NOTES
"Chief Complaint   Patient presents with    Post-op     2 week Post-op salpingectomy, Pt stating she is feeling sore and overall not feeling well         HPI  Annmarie Perera is a 33 y.o. female presents for postop visit. She notes she is still sore with movement. She denies n/v or fever/chills. She is tolerating a regular diet and denies vaginal bleeding.        The following portions of the patient's history were reviewed and updated as appropriate: allergies, current medications, past family history, past medical history, past social history, past surgical history, and problem list.    Review of Systems  Pertinent items are noted in HPI.    /74   Ht 166.4 cm (65.5\")   Wt 92.6 kg (204 lb 3.2 oz)   LMP  (LMP Unknown)   BMI 33.46 kg/m²         Physical Exam  Constitutional:       Appearance: Normal appearance.   Pulmonary:      Effort: Pulmonary effort is normal.   Abdominal:      Comments: Soft, no guarding/rebound, no focal tenderness. Incisions are clean/dry/intact. No surrounding erythema or swelling. Faint bruising below umbilical incision resolving.    Musculoskeletal:      Comments: Bilateral lower ext with trace edema; no cords or tenderness   Neurological:      General: No focal deficit present.      Mental Status: She is alert and oriented to person, place, and time.   Psychiatric:         Mood and Affect: Mood normal.         Behavior: Behavior normal.             Diagnoses and all orders for this visit:    1. Encounter for sterilization (Primary)    2. Postop check      Normal postop check. Advised of ongoing activity restrictions. Follow-up 2 to 3 weeks or prn.           "

## 2024-06-20 RX ORDER — CLONIDINE HYDROCHLORIDE 0.1 MG/1
0.1 TABLET ORAL 2 TIMES DAILY PRN
Qty: 60 TABLET | Refills: 0 | Status: SHIPPED | OUTPATIENT
Start: 2024-06-20

## 2024-06-20 RX ORDER — CLONIDINE HYDROCHLORIDE 0.1 MG/1
0.1 TABLET ORAL 2 TIMES DAILY PRN
Qty: 60 TABLET | Refills: 0 | OUTPATIENT
Start: 2024-06-20

## 2024-07-01 DIAGNOSIS — F41.1 GENERALIZED ANXIETY DISORDER: Chronic | ICD-10-CM

## 2024-07-01 DIAGNOSIS — F63.3 TRICHOTILLOMANIA: Chronic | ICD-10-CM

## 2024-07-01 DIAGNOSIS — F41.0 PANIC DISORDER: Chronic | ICD-10-CM

## 2024-07-01 RX ORDER — DIAZEPAM 10 MG/1
10 TABLET ORAL 3 TIMES DAILY PRN
Qty: 90 TABLET | Refills: 0 | Status: SHIPPED | OUTPATIENT
Start: 2024-07-01 | End: 2025-07-01

## 2024-07-12 RX ORDER — MEDROXYPROGESTERONE ACETATE 150 MG/ML
150 INJECTION, SUSPENSION INTRAMUSCULAR
COMMUNITY
End: 2024-07-12 | Stop reason: SDUPTHER

## 2024-07-12 RX ORDER — MEDROXYPROGESTERONE ACETATE 150 MG/ML
150 INJECTION, SUSPENSION INTRAMUSCULAR
Qty: 1 ML | Refills: 3 | Status: SHIPPED | OUTPATIENT
Start: 2024-07-12

## 2024-07-12 NOTE — TELEPHONE ENCOUNTER
Rx Refill Note  Requested Prescriptions     Pending Prescriptions Disp Refills    medroxyPROGESTERone (DEPO-PROVERA) 150 MG/ML injection 1 mL 0     Sig: Inject 1 mL into the appropriate muscle as directed by prescriber Every 3 (Three) Months.      Last office visit with prescribing clinician: 4/11/2024   Last telemedicine visit with prescribing clinician: Visit date not found   Next office visit with prescribing clinician: Visit date not found                         Would you like a call back once the refill request has been completed: [] Yes [] No    If the office needs to give you a call back, can they leave a voicemail: [] Yes [] No    Daylin Wiseman MA  07/12/24, 09:09 EDT

## 2024-07-16 ENCOUNTER — TELEPHONE (OUTPATIENT)
Dept: PSYCHIATRY | Facility: CLINIC | Age: 34
End: 2024-07-16
Payer: MEDICAID
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
## 2024-07-22 RX ORDER — CLONIDINE HYDROCHLORIDE 0.1 MG/1
0.1 TABLET ORAL 2 TIMES DAILY PRN
Qty: 180 TABLET | Refills: 0 | Status: SHIPPED | OUTPATIENT
Start: 2024-07-22

## 2024-08-01 ENCOUNTER — TELEMEDICINE (OUTPATIENT)
Dept: PSYCHIATRY | Facility: CLINIC | Age: 34
End: 2024-08-01
Payer: MEDICAID

## 2024-08-01 DIAGNOSIS — F41.1 GENERALIZED ANXIETY DISORDER: Chronic | ICD-10-CM

## 2024-08-01 DIAGNOSIS — F20.0 SCHIZOPHRENIA, PARANOID: Primary | Chronic | ICD-10-CM

## 2024-08-01 DIAGNOSIS — F63.3 TRICHOTILLOMANIA: Chronic | ICD-10-CM

## 2024-08-01 DIAGNOSIS — F41.0 PANIC DISORDER: Chronic | ICD-10-CM

## 2024-08-01 RX ORDER — ARIPIPRAZOLE 400 MG
400 KIT INTRAMUSCULAR
Qty: 1 EACH | Refills: 5 | Status: SHIPPED | OUTPATIENT
Start: 2024-08-01

## 2024-08-01 RX ORDER — DIAZEPAM 10 MG/1
10 TABLET ORAL 3 TIMES DAILY PRN
Qty: 90 TABLET | Refills: 0 | Status: SHIPPED | OUTPATIENT
Start: 2024-08-01 | End: 2025-08-01
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
## 2024-08-27 DIAGNOSIS — F41.0 PANIC DISORDER: Chronic | ICD-10-CM

## 2024-08-27 DIAGNOSIS — F63.3 TRICHOTILLOMANIA: Chronic | ICD-10-CM

## 2024-08-27 DIAGNOSIS — F41.1 GENERALIZED ANXIETY DISORDER: Chronic | ICD-10-CM

## 2024-08-28 RX ORDER — DIAZEPAM 10 MG
10 TABLET ORAL 3 TIMES DAILY PRN
Qty: 90 TABLET | Refills: 0 | Status: SHIPPED | OUTPATIENT
Start: 2024-08-28 | End: 2025-08-28

## 2024-09-05 ENCOUNTER — OFFICE VISIT (OUTPATIENT)
Dept: FAMILY MEDICINE CLINIC | Facility: CLINIC | Age: 34
End: 2024-09-05
Payer: MEDICAID

## 2024-09-05 VITALS
HEART RATE: 104 BPM | DIASTOLIC BLOOD PRESSURE: 80 MMHG | SYSTOLIC BLOOD PRESSURE: 122 MMHG | TEMPERATURE: 99.3 F | WEIGHT: 198 LBS | OXYGEN SATURATION: 95 % | BODY MASS INDEX: 32.45 KG/M2

## 2024-09-05 DIAGNOSIS — L73.9 FOLLICULITIS: ICD-10-CM

## 2024-09-05 DIAGNOSIS — B36.0 TINEA VERSICOLOR: ICD-10-CM

## 2024-09-05 DIAGNOSIS — Z20.2 EXPOSURE TO STD: Primary | ICD-10-CM

## 2024-09-05 DIAGNOSIS — T14.8XXA BRUISING: ICD-10-CM

## 2024-09-05 PROCEDURE — 1159F MED LIST DOCD IN RCRD: CPT | Performed by: NURSE PRACTITIONER

## 2024-09-05 PROCEDURE — 99214 OFFICE O/P EST MOD 30 MIN: CPT | Performed by: NURSE PRACTITIONER

## 2024-09-05 PROCEDURE — 1160F RVW MEDS BY RX/DR IN RCRD: CPT | Performed by: NURSE PRACTITIONER

## 2024-09-05 RX ORDER — KETOCONAZOLE 20 MG/G
1 CREAM TOPICAL DAILY
COMMUNITY
Start: 2024-09-01

## 2024-09-05 RX ORDER — CLINDAMYCIN HCL 300 MG
300 CAPSULE ORAL 3 TIMES DAILY
COMMUNITY
Start: 2024-09-01

## 2024-09-05 NOTE — PROGRESS NOTES
"Chief Complaint  Bleeding/Bruising, Rash, and Exposure to STD    Subjective        Annmarie Perera presents to [facility]  History of Present Illness  Patient is here to follow up for urgent care for white dots on her knee and athletes foot. She was prescribed a cream to apply to her knees and feet. She also is reporting a rash on her chin/neck area. The  gave her clindamycin for that.     She is also concerned for new exposure to STD and would like hepatitis c, hiv testing due to unprotected sex with a new partner.     She is also concerned for bruising. She denies self injury and doesn't know where they come from. She is worried about bleeding problems.    She had recent procedure for fallopian tube removal.   Objective   Vital Signs:  /80 (BP Location: Left arm, Patient Position: Sitting, Cuff Size: Adult)   Pulse 104   Temp 99.3 °F (37.4 °C) (Temporal)   Wt 89.8 kg (198 lb)   SpO2 95%   BMI 32.45 kg/m²   Estimated body mass index is 32.45 kg/m² as calculated from the following:    Height as of 6/18/24: 166.4 cm (65.5\").    Weight as of this encounter: 89.8 kg (198 lb).            Physical Exam  Constitutional:       Appearance: Normal appearance.   HENT:      Head: Normocephalic.        Comments: Flesh colored raised papules on underside of chin/top of neck, not draining or red     Nose: Nose normal.   Eyes:      Extraocular Movements: Extraocular movements intact.      Pupils: Pupils are equal, round, and reactive to light.   Pulmonary:      Effort: Pulmonary effort is normal.   Musculoskeletal:         General: Normal range of motion.      Cervical back: Normal range of motion.   Skin:            Comments: Bilateral knees with appearance of white dots on skin, not raised.     Multiple visible bruises on patient's bilateral legs, including large bruise on left upper thigh   Neurological:      General: No focal deficit present.      Mental Status: She is alert. Mental " status is at baseline.   Psychiatric:         Attention and Perception: Attention normal.         Mood and Affect: Affect is flat.         Speech: Speech is rapid and pressured.         Behavior: Behavior is agitated.        Result Review :                Assessment and Plan   Diagnoses and all orders for this visit:    1. Exposure to STD (Primary)  -     HIV-1/O/2 Ag/Ab w Reflex  -     Hepatitis C Antibody    2. Bruising  -     CBC w AUTO Differential  -     Protime-INR  -     Comprehensive metabolic panel    3. Tinea versicolor    4. Folliculitis      Patient advised to continue topical and oral medications given by [facility] for tinea and folliculitis.        Follow Up   Return if symptoms worsen or fail to improve.  Patient was given instructions and counseling regarding her condition or for health maintenance advice. Please see specific information pulled into the AVS if appropriate.

## 2024-09-06 ENCOUNTER — TELEPHONE (OUTPATIENT)
Dept: FAMILY MEDICINE CLINIC | Facility: CLINIC | Age: 34
End: 2024-09-06
Payer: MEDICAID

## 2024-09-06 LAB
ALBUMIN SERPL-MCNC: 4.5 G/DL (ref 3.9–4.9)
ALP SERPL-CCNC: 69 IU/L (ref 44–121)
ALT SERPL-CCNC: 12 IU/L (ref 0–32)
AST SERPL-CCNC: 14 IU/L (ref 0–40)
BASOPHILS # BLD AUTO: 0 X10E3/UL (ref 0–0.2)
BASOPHILS NFR BLD AUTO: 0 %
BILIRUB SERPL-MCNC: 0.3 MG/DL (ref 0–1.2)
BUN SERPL-MCNC: 17 MG/DL (ref 6–20)
BUN/CREAT SERPL: 22 (ref 9–23)
CALCIUM SERPL-MCNC: 9.6 MG/DL (ref 8.7–10.2)
CHLORIDE SERPL-SCNC: 102 MMOL/L (ref 96–106)
CO2 SERPL-SCNC: 23 MMOL/L (ref 20–29)
CREAT SERPL-MCNC: 0.79 MG/DL (ref 0.57–1)
EGFRCR SERPLBLD CKD-EPI 2021: 100 ML/MIN/1.73
EOSINOPHIL # BLD AUTO: 0.1 X10E3/UL (ref 0–0.4)
EOSINOPHIL NFR BLD AUTO: 1 %
ERYTHROCYTE [DISTWIDTH] IN BLOOD BY AUTOMATED COUNT: 12 % (ref 11.7–15.4)
GLOBULIN SER CALC-MCNC: 2.3 G/DL (ref 1.5–4.5)
GLUCOSE SERPL-MCNC: 96 MG/DL (ref 70–99)
HCT VFR BLD AUTO: 41.7 % (ref 34–46.6)
HCV IGG SERPL QL IA: NON REACTIVE
HGB BLD-MCNC: 13.7 G/DL (ref 11.1–15.9)
HIV 1+2 AB+HIV1 P24 AG SERPL QL IA: NON REACTIVE
IMM GRANULOCYTES # BLD AUTO: 0 X10E3/UL (ref 0–0.1)
IMM GRANULOCYTES NFR BLD AUTO: 1 %
INR PPP: 0.9 (ref 0.9–1.2)
LYMPHOCYTES # BLD AUTO: 1.8 X10E3/UL (ref 0.7–3.1)
LYMPHOCYTES NFR BLD AUTO: 23 %
MCH RBC QN AUTO: 31.5 PG (ref 26.6–33)
MCHC RBC AUTO-ENTMCNC: 32.9 G/DL (ref 31.5–35.7)
MCV RBC AUTO: 96 FL (ref 79–97)
MONOCYTES # BLD AUTO: 0.5 X10E3/UL (ref 0.1–0.9)
MONOCYTES NFR BLD AUTO: 7 %
NEUTROPHILS # BLD AUTO: 5.3 X10E3/UL (ref 1.4–7)
NEUTROPHILS NFR BLD AUTO: 68 %
PLATELET # BLD AUTO: 264 X10E3/UL (ref 150–450)
POTASSIUM SERPL-SCNC: 4.4 MMOL/L (ref 3.5–5.2)
PROT SERPL-MCNC: 6.8 G/DL (ref 6–8.5)
PROTHROMBIN TIME: 10.4 SEC (ref 9.1–12)
RBC # BLD AUTO: 4.35 X10E6/UL (ref 3.77–5.28)
SODIUM SERPL-SCNC: 141 MMOL/L (ref 134–144)
WBC # BLD AUTO: 7.7 X10E3/UL (ref 3.4–10.8)

## 2024-09-06 NOTE — TELEPHONE ENCOUNTER
LMTCMERVAT    **HUB/FO** MAY RELAY MESSAGE      ----- Message from Meg Huff sent at 9/6/2024  9:42 AM EDT -----    Let patient know all of her labs are good, no concerns.

## 2024-09-09 ENCOUNTER — TELEPHONE (OUTPATIENT)
Dept: FAMILY MEDICINE CLINIC | Facility: CLINIC | Age: 34
End: 2024-09-09
Payer: MEDICAID

## 2024-09-09 RX ORDER — ARIPIPRAZOLE 400 MG
KIT INTRAMUSCULAR
Qty: 1 EACH | Refills: 5 | Status: SHIPPED | OUTPATIENT
Start: 2024-09-09

## 2024-09-09 RX ORDER — OLANZAPINE 10 MG/1
10 TABLET ORAL 2 TIMES DAILY
Qty: 180 TABLET | Refills: 1 | Status: SHIPPED | OUTPATIENT
Start: 2024-09-09
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
## 2024-09-10 DIAGNOSIS — F41.0 PANIC DISORDER: Chronic | ICD-10-CM

## 2024-09-10 DIAGNOSIS — F41.1 GENERALIZED ANXIETY DISORDER: Chronic | ICD-10-CM

## 2024-09-11 RX ORDER — GABAPENTIN 800 MG/1
800 TABLET ORAL 4 TIMES DAILY
Qty: 360 TABLET | Refills: 0 | Status: SHIPPED | OUTPATIENT
Start: 2024-09-11

## 2024-09-11 NOTE — TELEPHONE ENCOUNTER
I am not sure if you got this message or not?  Patient came into the office today stating that her fungal infection is worse than she thought so she has been taking in more than she was told, she wanted to know if more can be called in.

## 2024-09-12 ENCOUNTER — TELEPHONE (OUTPATIENT)
Dept: FAMILY MEDICINE CLINIC | Facility: CLINIC | Age: 34
End: 2024-09-12
Payer: MEDICAID

## 2024-09-12 ENCOUNTER — TELEPHONE (OUTPATIENT)
Dept: PSYCHIATRY | Facility: CLINIC | Age: 34
End: 2024-09-12
Payer: MEDICAID

## 2024-09-12 DIAGNOSIS — B36.0 TINEA VERSICOLOR: Primary | ICD-10-CM

## 2024-09-12 RX ORDER — TERBINAFINE HYDROCHLORIDE 250 MG/1
250 TABLET ORAL DAILY
Qty: 30 TABLET | Refills: 1 | Status: SHIPPED | OUTPATIENT
Start: 2024-09-12

## 2024-09-12 NOTE — TELEPHONE ENCOUNTER
let this patient know I sent oral fungal infection medicine to the [facility] in [place]. She is to take one pill daily. Can take 4-6 weeks possibly to improve the skin issue.       LMTCB    **HUB/FO** MAY RELAY MESSAGE ABOVE
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
## 2024-09-25 DIAGNOSIS — F41.1 GENERALIZED ANXIETY DISORDER: Chronic | ICD-10-CM

## 2024-09-25 DIAGNOSIS — F41.0 PANIC DISORDER: Chronic | ICD-10-CM

## 2024-09-25 DIAGNOSIS — F63.3 TRICHOTILLOMANIA: Chronic | ICD-10-CM

## 2024-09-25 RX ORDER — DIAZEPAM 10 MG
10 TABLET ORAL 3 TIMES DAILY PRN
Qty: 90 TABLET | Refills: 0 | Status: SHIPPED | OUTPATIENT
Start: 2024-09-25 | End: 2025-09-25

## 2024-10-23 DIAGNOSIS — F41.0 PANIC DISORDER: Chronic | ICD-10-CM

## 2024-10-23 DIAGNOSIS — F63.3 TRICHOTILLOMANIA: Chronic | ICD-10-CM

## 2024-10-23 DIAGNOSIS — F41.1 GENERALIZED ANXIETY DISORDER: Chronic | ICD-10-CM

## 2024-10-24 RX ORDER — DIAZEPAM 10 MG
10 TABLET ORAL 3 TIMES DAILY PRN
Qty: 90 TABLET | Refills: 0 | Status: SHIPPED | OUTPATIENT
Start: 2024-10-24 | End: 2025-10-24

## 2024-10-28 RX ORDER — CLONIDINE HYDROCHLORIDE 0.1 MG/1
0.1 TABLET ORAL 2 TIMES DAILY PRN
Qty: 180 TABLET | Refills: 0 | Status: SHIPPED | OUTPATIENT
Start: 2024-10-28

## 2024-11-04 ENCOUNTER — TELEMEDICINE (OUTPATIENT)
Dept: PSYCHIATRY | Facility: CLINIC | Age: 34
End: 2024-11-04
Payer: MEDICAID

## 2024-11-04 DIAGNOSIS — F41.1 GENERALIZED ANXIETY DISORDER: Chronic | ICD-10-CM

## 2024-11-04 DIAGNOSIS — F20.0 SCHIZOPHRENIA, PARANOID: Primary | Chronic | ICD-10-CM

## 2024-11-04 DIAGNOSIS — F63.3 TRICHOTILLOMANIA: Chronic | ICD-10-CM

## 2024-11-04 DIAGNOSIS — F41.0 PANIC DISORDER: Chronic | ICD-10-CM

## 2024-11-04 PROCEDURE — 1159F MED LIST DOCD IN RCRD: CPT | Performed by: PHYSICIAN ASSISTANT

## 2024-11-04 PROCEDURE — 99214 OFFICE O/P EST MOD 30 MIN: CPT | Performed by: PHYSICIAN ASSISTANT

## 2024-11-04 PROCEDURE — 1160F RVW MEDS BY RX/DR IN RCRD: CPT | Performed by: PHYSICIAN ASSISTANT

## 2024-11-04 PROCEDURE — 96127 BRIEF EMOTIONAL/BEHAV ASSMT: CPT | Performed by: PHYSICIAN ASSISTANT
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
## 2024-11-11 DIAGNOSIS — F41.0 PANIC DISORDER: Chronic | ICD-10-CM

## 2024-11-11 DIAGNOSIS — F41.1 GENERALIZED ANXIETY DISORDER: Chronic | ICD-10-CM

## 2024-11-11 RX ORDER — GABAPENTIN 800 MG/1
800 TABLET ORAL 4 TIMES DAILY
Qty: 360 TABLET | Refills: 0 | Status: SHIPPED | OUTPATIENT
Start: 2024-11-11

## 2024-12-03 DIAGNOSIS — F41.0 PANIC DISORDER: Chronic | ICD-10-CM

## 2024-12-03 DIAGNOSIS — F63.3 TRICHOTILLOMANIA: Chronic | ICD-10-CM

## 2024-12-03 DIAGNOSIS — F41.1 GENERALIZED ANXIETY DISORDER: Chronic | ICD-10-CM

## 2024-12-03 RX ORDER — DIAZEPAM 10 MG/1
10 TABLET ORAL 3 TIMES DAILY PRN
Qty: 90 TABLET | Refills: 0 | Status: SHIPPED | OUTPATIENT
Start: 2024-12-03 | End: 2025-12-03

## 2024-12-09 ENCOUNTER — PRIOR AUTHORIZATION (OUTPATIENT)
Dept: PSYCHIATRY | Facility: CLINIC | Age: 34
End: 2024-12-09
Payer: MEDICAID

## 2024-12-27 DIAGNOSIS — F41.1 GENERALIZED ANXIETY DISORDER: Chronic | ICD-10-CM

## 2024-12-27 DIAGNOSIS — F63.3 TRICHOTILLOMANIA: Chronic | ICD-10-CM

## 2024-12-27 DIAGNOSIS — F41.0 PANIC DISORDER: Chronic | ICD-10-CM

## 2024-12-30 RX ORDER — DIAZEPAM 10 MG/1
10 TABLET ORAL 3 TIMES DAILY PRN
Qty: 90 TABLET | Refills: 0 | Status: SHIPPED | OUTPATIENT
Start: 2024-12-30 | End: 2025-12-30

## 2025-01-28 DIAGNOSIS — F41.0 PANIC DISORDER: Chronic | ICD-10-CM

## 2025-01-28 DIAGNOSIS — F41.1 GENERALIZED ANXIETY DISORDER: Chronic | ICD-10-CM

## 2025-01-28 DIAGNOSIS — F63.3 TRICHOTILLOMANIA: Chronic | ICD-10-CM

## 2025-01-28 RX ORDER — DIAZEPAM 10 MG/1
10 TABLET ORAL 3 TIMES DAILY PRN
Qty: 90 TABLET | Refills: 0 | Status: SHIPPED | OUTPATIENT
Start: 2025-01-28 | End: 2026-01-28

## 2025-02-20 ENCOUNTER — TELEMEDICINE (OUTPATIENT)
Dept: PSYCHIATRY | Facility: CLINIC | Age: 35
End: 2025-02-20
Payer: MEDICAID

## 2025-02-20 DIAGNOSIS — F41.0 PANIC DISORDER: Chronic | ICD-10-CM

## 2025-02-20 DIAGNOSIS — F63.3 TRICHOTILLOMANIA: Chronic | ICD-10-CM

## 2025-02-20 DIAGNOSIS — F41.1 GENERALIZED ANXIETY DISORDER: Chronic | ICD-10-CM

## 2025-02-20 DIAGNOSIS — F20.0 SCHIZOPHRENIA, PARANOID: Primary | Chronic | ICD-10-CM

## 2025-02-20 RX ORDER — GABAPENTIN 800 MG/1
800 TABLET ORAL 4 TIMES DAILY
Qty: 360 TABLET | Refills: 0 | Status: SHIPPED | OUTPATIENT
Start: 2025-02-20

## 2025-02-20 RX ORDER — DIAZEPAM 10 MG/1
10 TABLET ORAL 3 TIMES DAILY PRN
Qty: 90 TABLET | Refills: 0 | Status: SHIPPED | OUTPATIENT
Start: 2025-02-20 | End: 2026-02-20

## 2025-02-20 RX ORDER — ARIPIPRAZOLE 400 MG
400 KIT INTRAMUSCULAR
Qty: 1 EACH | Refills: 5 | Status: SHIPPED | OUTPATIENT
Start: 2025-02-20

## 2025-02-20 RX ORDER — OLANZAPINE 10 MG/1
10 TABLET ORAL 2 TIMES DAILY
Qty: 180 TABLET | Refills: 1 | Status: SHIPPED | OUTPATIENT
Start: 2025-02-20
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
## 2025-03-24 DIAGNOSIS — F41.0 PANIC DISORDER: Chronic | ICD-10-CM

## 2025-03-24 DIAGNOSIS — F63.3 TRICHOTILLOMANIA: Chronic | ICD-10-CM

## 2025-03-24 DIAGNOSIS — F41.1 GENERALIZED ANXIETY DISORDER: Chronic | ICD-10-CM

## 2025-03-25 RX ORDER — DIAZEPAM 10 MG/1
10 TABLET ORAL 3 TIMES DAILY PRN
Qty: 90 TABLET | Refills: 0 | Status: SHIPPED | OUTPATIENT
Start: 2025-03-25 | End: 2026-03-25

## 2025-04-14 ENCOUNTER — TELEPHONE (OUTPATIENT)
Dept: PSYCHIATRY | Facility: CLINIC | Age: 35
End: 2025-04-14
Payer: MEDICAID

## 2025-04-14 NOTE — TELEPHONE ENCOUNTER
Pt called and left a vm wanting to leave a message for the provider.    Called pt and and she states she thinks she is having a nervous break down.  Pt wanted to ask the provider if there is another anxiety medication that she can take with the Diazepam.

## 2025-04-21 DIAGNOSIS — F41.0 PANIC DISORDER: Chronic | ICD-10-CM

## 2025-04-21 DIAGNOSIS — F63.3 TRICHOTILLOMANIA: Chronic | ICD-10-CM

## 2025-04-21 DIAGNOSIS — F41.1 GENERALIZED ANXIETY DISORDER: Chronic | ICD-10-CM

## 2025-04-21 RX ORDER — DIAZEPAM 10 MG/1
10 TABLET ORAL 3 TIMES DAILY PRN
Qty: 90 TABLET | Refills: 0 | Status: SHIPPED | OUTPATIENT
Start: 2025-04-21

## 2025-04-21 RX ORDER — CLONIDINE HYDROCHLORIDE 0.2 MG/1
0.2 TABLET ORAL 2 TIMES DAILY
Qty: 60 TABLET | Refills: 1 | Status: SHIPPED | OUTPATIENT
Start: 2025-04-21

## 2025-05-19 DIAGNOSIS — F41.0 PANIC DISORDER: Chronic | ICD-10-CM

## 2025-05-19 DIAGNOSIS — F41.1 GENERALIZED ANXIETY DISORDER: Chronic | ICD-10-CM

## 2025-05-19 DIAGNOSIS — F63.3 TRICHOTILLOMANIA: Chronic | ICD-10-CM

## 2025-05-19 RX ORDER — ARIPIPRAZOLE 400 MG
400 KIT INTRAMUSCULAR
Qty: 1 EACH | Refills: 5 | Status: SHIPPED | OUTPATIENT
Start: 2025-05-19

## 2025-05-19 RX ORDER — GABAPENTIN 800 MG/1
800 TABLET ORAL 4 TIMES DAILY
Qty: 360 TABLET | Refills: 0 | Status: SHIPPED | OUTPATIENT
Start: 2025-05-19

## 2025-05-19 RX ORDER — DIAZEPAM 10 MG/1
10 TABLET ORAL 3 TIMES DAILY PRN
Qty: 90 TABLET | Refills: 0 | Status: SHIPPED | OUTPATIENT
Start: 2025-05-19

## 2025-05-22 ENCOUNTER — TELEPHONE (OUTPATIENT)
Dept: PSYCHIATRY | Facility: CLINIC | Age: 35
End: 2025-05-22
Payer: MEDICAID

## 2025-05-22 NOTE — TELEPHONE ENCOUNTER
Pt called and states she missed her appt yesterday 05/21/2025 due to not having service.  Pt wanted to make provider aware.  Pt also went ahead and rescheduled that missed appt to the next available appt on 07/01/2025.

## 2025-05-27 ENCOUNTER — TELEPHONE (OUTPATIENT)
Dept: PSYCHIATRY | Facility: CLINIC | Age: 35
End: 2025-05-27
Payer: MEDICAID

## 2025-05-27 NOTE — TELEPHONE ENCOUNTER
Pt called said she's living with mother and extremely stress Pt wants to know if she can increase valium     Please advise

## 2025-06-14 ENCOUNTER — HOSPITAL ENCOUNTER (INPATIENT)
Facility: HOSPITAL | Age: 35
LOS: 1 days | Discharge: HOME | End: 2025-06-17
Attending: EMERGENCY MEDICINE | Admitting: HOSPITALIST
Payer: MEDICAID

## 2025-06-14 ENCOUNTER — APPOINTMENT (OUTPATIENT)
Dept: CT IMAGING | Facility: HOSPITAL | Age: 35
End: 2025-06-14
Payer: MEDICAID

## 2025-06-14 DIAGNOSIS — F63.3 TRICHOTILLOMANIA: Chronic | ICD-10-CM

## 2025-06-14 DIAGNOSIS — F20.9 SCHIZOPHRENIA, UNSPECIFIED TYPE: Primary | ICD-10-CM

## 2025-06-14 DIAGNOSIS — F41.1 GENERALIZED ANXIETY DISORDER: Chronic | ICD-10-CM

## 2025-06-14 DIAGNOSIS — F13.939 BENZODIAZEPINE WITHDRAWAL WITH COMPLICATION: ICD-10-CM

## 2025-06-14 DIAGNOSIS — F41.0 PANIC DISORDER: Chronic | ICD-10-CM

## 2025-06-14 PROBLEM — F29 PSYCHOSIS: Status: ACTIVE | Noted: 2025-06-14

## 2025-06-14 LAB
ALBUMIN SERPL-MCNC: 4.7 G/DL (ref 3.5–5.2)
ALBUMIN/GLOB SERPL: 1.9 G/DL
ALP SERPL-CCNC: 70 U/L (ref 39–117)
ALT SERPL W P-5'-P-CCNC: 12 U/L (ref 1–33)
AMPHET+METHAMPHET UR QL: NEGATIVE
AMPHETAMINES UR QL: NEGATIVE
ANION GAP SERPL CALCULATED.3IONS-SCNC: 12.1 MMOL/L (ref 5–15)
AST SERPL-CCNC: 17 U/L (ref 1–32)
BACTERIA UR QL AUTO: ABNORMAL /HPF
BARBITURATES UR QL SCN: NEGATIVE
BASOPHILS # BLD AUTO: 0.03 10*3/MM3 (ref 0–0.2)
BASOPHILS NFR BLD AUTO: 0.2 % (ref 0–1.5)
BENZODIAZ UR QL SCN: POSITIVE
BILIRUB SERPL-MCNC: 0.2 MG/DL (ref 0–1.2)
BILIRUB UR QL STRIP: NEGATIVE
BUN SERPL-MCNC: 24 MG/DL (ref 6–20)
BUN/CREAT SERPL: 28.9 (ref 7–25)
BUPRENORPHINE SERPL-MCNC: NEGATIVE NG/ML
CALCIUM SPEC-SCNC: 9 MG/DL (ref 8.6–10.5)
CANNABINOIDS SERPL QL: NEGATIVE
CHLORIDE SERPL-SCNC: 104 MMOL/L (ref 98–107)
CK SERPL-CCNC: 187 U/L (ref 20–180)
CLARITY UR: ABNORMAL
CO2 SERPL-SCNC: 23.9 MMOL/L (ref 22–29)
COCAINE UR QL: POSITIVE
COLOR UR: YELLOW
CREAT SERPL-MCNC: 0.83 MG/DL (ref 0.57–1)
DEPRECATED RDW RBC AUTO: 44.8 FL (ref 37–54)
EGFRCR SERPLBLD CKD-EPI 2021: 93.8 ML/MIN/1.73
EOSINOPHIL # BLD AUTO: 0.03 10*3/MM3 (ref 0–0.4)
EOSINOPHIL NFR BLD AUTO: 0.2 % (ref 0.3–6.2)
ERYTHROCYTE [DISTWIDTH] IN BLOOD BY AUTOMATED COUNT: 12.4 % (ref 12.3–15.4)
GLOBULIN UR ELPH-MCNC: 2.5 GM/DL
GLUCOSE SERPL-MCNC: 102 MG/DL (ref 65–99)
GLUCOSE UR STRIP-MCNC: NEGATIVE MG/DL
HCT VFR BLD AUTO: 40.4 % (ref 34–46.6)
HGB BLD-MCNC: 12.9 G/DL (ref 12–15.9)
HGB UR QL STRIP.AUTO: NEGATIVE
HOLD SPECIMEN: NORMAL
HYALINE CASTS UR QL AUTO: ABNORMAL /LPF
IMM GRANULOCYTES # BLD AUTO: 0.07 10*3/MM3 (ref 0–0.05)
IMM GRANULOCYTES NFR BLD AUTO: 0.5 % (ref 0–0.5)
KETONES UR QL STRIP: ABNORMAL
LEUKOCYTE ESTERASE UR QL STRIP.AUTO: NEGATIVE
LYMPHOCYTES # BLD AUTO: 0.88 10*3/MM3 (ref 0.7–3.1)
LYMPHOCYTES NFR BLD AUTO: 6.5 % (ref 19.6–45.3)
MCH RBC QN AUTO: 31.5 PG (ref 26.6–33)
MCHC RBC AUTO-ENTMCNC: 31.9 G/DL (ref 31.5–35.7)
MCV RBC AUTO: 98.8 FL (ref 79–97)
METHADONE UR QL SCN: NEGATIVE
MONOCYTES # BLD AUTO: 0.59 10*3/MM3 (ref 0.1–0.9)
MONOCYTES NFR BLD AUTO: 4.4 % (ref 5–12)
MUCOUS THREADS URNS QL MICRO: ABNORMAL /HPF
NEUTROPHILS NFR BLD AUTO: 11.88 10*3/MM3 (ref 1.7–7)
NEUTROPHILS NFR BLD AUTO: 88.2 % (ref 42.7–76)
NITRITE UR QL STRIP: NEGATIVE
NRBC BLD AUTO-RTO: 0 /100 WBC (ref 0–0.2)
OPIATES UR QL: NEGATIVE
OXYCODONE UR QL SCN: NEGATIVE
PCP UR QL SCN: NEGATIVE
PH UR STRIP.AUTO: 5.5 [PH] (ref 5–8)
PLATELET # BLD AUTO: 298 10*3/MM3 (ref 140–450)
PMV BLD AUTO: 9.6 FL (ref 6–12)
POTASSIUM SERPL-SCNC: 3.9 MMOL/L (ref 3.5–5.2)
PROT SERPL-MCNC: 7.2 G/DL (ref 6–8.5)
PROT UR QL STRIP: ABNORMAL
RBC # BLD AUTO: 4.09 10*6/MM3 (ref 3.77–5.28)
RBC # UR STRIP: ABNORMAL /HPF
REF LAB TEST METHOD: ABNORMAL
SODIUM SERPL-SCNC: 140 MMOL/L (ref 136–145)
SP GR UR STRIP: 1.03 (ref 1–1.03)
SQUAMOUS #/AREA URNS HPF: ABNORMAL /HPF
TRICYCLICS UR QL SCN: NEGATIVE
UROBILINOGEN UR QL STRIP: ABNORMAL
WBC # UR STRIP: ABNORMAL /HPF
WBC NRBC COR # BLD AUTO: 13.48 10*3/MM3 (ref 3.4–10.8)

## 2025-06-14 PROCEDURE — 25010000002 ENOXAPARIN PER 10 MG: Performed by: INTERNAL MEDICINE

## 2025-06-14 PROCEDURE — 99285 EMERGENCY DEPT VISIT HI MDM: CPT

## 2025-06-14 PROCEDURE — 80306 DRUG TEST PRSMV INSTRMNT: CPT | Performed by: EMERGENCY MEDICINE

## 2025-06-14 PROCEDURE — 99222 1ST HOSP IP/OBS MODERATE 55: CPT | Performed by: PSYCHIATRY & NEUROLOGY

## 2025-06-14 PROCEDURE — 25010000002 DIAZEPAM PER 5 MG: Performed by: EMERGENCY MEDICINE

## 2025-06-14 PROCEDURE — P9612 CATHETERIZE FOR URINE SPEC: HCPCS

## 2025-06-14 PROCEDURE — 81001 URINALYSIS AUTO W/SCOPE: CPT | Performed by: INTERNAL MEDICINE

## 2025-06-14 PROCEDURE — G0378 HOSPITAL OBSERVATION PER HR: HCPCS

## 2025-06-14 PROCEDURE — 85025 COMPLETE CBC W/AUTO DIFF WBC: CPT | Performed by: EMERGENCY MEDICINE

## 2025-06-14 PROCEDURE — 25810000003 LACTATED RINGERS PER 1000 ML: Performed by: INTERNAL MEDICINE

## 2025-06-14 PROCEDURE — 70450 CT HEAD/BRAIN W/O DYE: CPT

## 2025-06-14 PROCEDURE — 80053 COMPREHEN METABOLIC PANEL: CPT | Performed by: EMERGENCY MEDICINE

## 2025-06-14 PROCEDURE — 82550 ASSAY OF CK (CPK): CPT | Performed by: EMERGENCY MEDICINE

## 2025-06-14 RX ORDER — ACETAMINOPHEN 325 MG/1
650 TABLET ORAL EVERY 6 HOURS PRN
Status: DISCONTINUED | OUTPATIENT
Start: 2025-06-14 | End: 2025-06-17 | Stop reason: HOSPADM

## 2025-06-14 RX ORDER — POLYETHYLENE GLYCOL 3350 17 G/17G
17 POWDER, FOR SOLUTION ORAL DAILY PRN
Status: DISCONTINUED | OUTPATIENT
Start: 2025-06-14 | End: 2025-06-17 | Stop reason: HOSPADM

## 2025-06-14 RX ORDER — SODIUM CHLORIDE 9 MG/ML
40 INJECTION, SOLUTION INTRAVENOUS AS NEEDED
Status: DISCONTINUED | OUTPATIENT
Start: 2025-06-14 | End: 2025-06-17 | Stop reason: HOSPADM

## 2025-06-14 RX ORDER — AMOXICILLIN 250 MG
2 CAPSULE ORAL 2 TIMES DAILY PRN
Status: DISCONTINUED | OUTPATIENT
Start: 2025-06-14 | End: 2025-06-17 | Stop reason: HOSPADM

## 2025-06-14 RX ORDER — METHOCARBAMOL 500 MG/1
500 TABLET, FILM COATED ORAL EVERY 8 HOURS PRN
Status: DISCONTINUED | OUTPATIENT
Start: 2025-06-14 | End: 2025-06-17 | Stop reason: HOSPADM

## 2025-06-14 RX ORDER — ONDANSETRON 2 MG/ML
4 INJECTION INTRAMUSCULAR; INTRAVENOUS EVERY 6 HOURS PRN
Status: DISCONTINUED | OUTPATIENT
Start: 2025-06-14 | End: 2025-06-17 | Stop reason: HOSPADM

## 2025-06-14 RX ORDER — OLANZAPINE 5 MG/1
10 TABLET, FILM COATED ORAL 2 TIMES DAILY
Status: DISCONTINUED | OUTPATIENT
Start: 2025-06-14 | End: 2025-06-16

## 2025-06-14 RX ORDER — TRAMADOL HYDROCHLORIDE 50 MG/1
50 TABLET ORAL EVERY 6 HOURS PRN
Status: DISCONTINUED | OUTPATIENT
Start: 2025-06-14 | End: 2025-06-17 | Stop reason: HOSPADM

## 2025-06-14 RX ORDER — OLANZAPINE 5 MG/1
10 TABLET, ORALLY DISINTEGRATING ORAL ONCE
Status: DISCONTINUED | OUTPATIENT
Start: 2025-06-14 | End: 2025-06-14

## 2025-06-14 RX ORDER — GABAPENTIN 400 MG/1
800 CAPSULE ORAL EVERY 8 HOURS SCHEDULED
Status: DISCONTINUED | OUTPATIENT
Start: 2025-06-14 | End: 2025-06-17 | Stop reason: HOSPADM

## 2025-06-14 RX ORDER — SODIUM CHLORIDE 0.9 % (FLUSH) 0.9 %
10 SYRINGE (ML) INJECTION AS NEEDED
Status: DISCONTINUED | OUTPATIENT
Start: 2025-06-14 | End: 2025-06-17 | Stop reason: HOSPADM

## 2025-06-14 RX ORDER — DIAZEPAM 10 MG/2ML
5 INJECTION, SOLUTION INTRAMUSCULAR; INTRAVENOUS ONCE
Status: COMPLETED | OUTPATIENT
Start: 2025-06-14 | End: 2025-06-14

## 2025-06-14 RX ORDER — DIAZEPAM 5 MG/1
10 TABLET ORAL 3 TIMES DAILY PRN
Status: DISCONTINUED | OUTPATIENT
Start: 2025-06-14 | End: 2025-06-17 | Stop reason: HOSPADM

## 2025-06-14 RX ORDER — ONDANSETRON 4 MG/1
4 TABLET, ORALLY DISINTEGRATING ORAL EVERY 6 HOURS PRN
Status: DISCONTINUED | OUTPATIENT
Start: 2025-06-14 | End: 2025-06-17 | Stop reason: HOSPADM

## 2025-06-14 RX ORDER — ENOXAPARIN SODIUM 100 MG/ML
40 INJECTION SUBCUTANEOUS EVERY 24 HOURS
Status: DISCONTINUED | OUTPATIENT
Start: 2025-06-14 | End: 2025-06-17 | Stop reason: HOSPADM

## 2025-06-14 RX ORDER — SODIUM CHLORIDE, SODIUM LACTATE, POTASSIUM CHLORIDE, CALCIUM CHLORIDE 600; 310; 30; 20 MG/100ML; MG/100ML; MG/100ML; MG/100ML
100 INJECTION, SOLUTION INTRAVENOUS CONTINUOUS
Status: DISPENSED | OUTPATIENT
Start: 2025-06-14 | End: 2025-06-14

## 2025-06-14 RX ORDER — DIAZEPAM 5 MG/1
5 TABLET ORAL 3 TIMES DAILY
Status: DISCONTINUED | OUTPATIENT
Start: 2025-06-14 | End: 2025-06-17 | Stop reason: HOSPADM

## 2025-06-14 RX ORDER — SUMATRIPTAN 50 MG/1
50 TABLET, FILM COATED ORAL ONCE AS NEEDED
Status: DISCONTINUED | OUTPATIENT
Start: 2025-06-14 | End: 2025-06-17 | Stop reason: HOSPADM

## 2025-06-14 RX ORDER — SODIUM CHLORIDE 0.9 % (FLUSH) 0.9 %
10 SYRINGE (ML) INJECTION EVERY 12 HOURS SCHEDULED
Status: DISCONTINUED | OUTPATIENT
Start: 2025-06-14 | End: 2025-06-17 | Stop reason: HOSPADM

## 2025-06-14 RX ORDER — BISACODYL 10 MG
10 SUPPOSITORY, RECTAL RECTAL DAILY PRN
Status: DISCONTINUED | OUTPATIENT
Start: 2025-06-14 | End: 2025-06-17 | Stop reason: HOSPADM

## 2025-06-14 RX ORDER — BISACODYL 5 MG/1
5 TABLET, DELAYED RELEASE ORAL DAILY PRN
Status: DISCONTINUED | OUTPATIENT
Start: 2025-06-14 | End: 2025-06-17 | Stop reason: HOSPADM

## 2025-06-14 RX ADMIN — OLANZAPINE 10 MG: 5 TABLET, FILM COATED ORAL at 10:11

## 2025-06-14 RX ADMIN — ENOXAPARIN SODIUM 40 MG: 100 INJECTION SUBCUTANEOUS at 16:33

## 2025-06-14 RX ADMIN — SODIUM CHLORIDE, POTASSIUM CHLORIDE, SODIUM LACTATE AND CALCIUM CHLORIDE 100 ML/HR: 600; 310; 30; 20 INJECTION, SOLUTION INTRAVENOUS at 10:13

## 2025-06-14 RX ADMIN — Medication 10 ML: at 10:13

## 2025-06-14 RX ADMIN — GABAPENTIN 800 MG: 400 CAPSULE ORAL at 21:32

## 2025-06-14 RX ADMIN — DIAZEPAM 5 MG: 5 TABLET ORAL at 16:33

## 2025-06-14 RX ADMIN — Medication 10 ML: at 21:33

## 2025-06-14 RX ADMIN — DIAZEPAM 5 MG: 10 INJECTION, SOLUTION INTRAMUSCULAR; INTRAVENOUS at 08:48

## 2025-06-14 RX ADMIN — OLANZAPINE 10 MG: 5 TABLET, FILM COATED ORAL at 21:33

## 2025-06-14 NOTE — PROGRESS NOTES
Annmarie Perera is a 34 y.o. female admitted with seizure.     Recent Labs     06/14/25  0827   CREATININE 0.83   BUN 24.0*      K 3.9      CO2 23.9     CrCl cannot be calculated (Unknown ideal weight.).    Assessment/Plan  Enxoaparin renally adjusted to 40 mg daily per the Adult Renal Dosing of Medication policy for estCrCl > 30 mL/min    Víctor Kang, PharmD  6/14/2025

## 2025-06-14 NOTE — PLAN OF CARE
Goal Outcome Evaluation:         Pt appears asleep but once awake she was cooperative and polite, she spoke to her self in a low voice while RN was in room, vitals stable, eating well.

## 2025-06-14 NOTE — CONSULTS
Referring Provider: Junior Prajapati MD   Reason for Consultation: schizophrenia, hallucinations, benzo withdrawal     Chief complaint the patient was unable to express any complaints secondary to decreased level of conscious    Subjective .     History of present illness:  The patient is a 34 y.o. female who was admitted secondary to seizures. PMHx:bipolar, schizophrenia .  Psychiatric consult was requested by Junior Prajapati MD secondary to schizophrenia, hallucinations and benzodiazepine withdrawal.  The patient was unable to provide any information secondary to decreased level of consciousness.  According to the records, the patient had witnessed grand mal seizure activity.  Family expressed concerns that the patient had not been taking medications, she has no history of schizophrenia, the patient experienced hallucinations, she was feeling paranoid, believes people were coming to get her to.  The patient sees Batool Carter in virtual clinic.  Next appointment 7/1/2025, the patient canceled appointment on 5/21/2025, but she requested to increase Valium.  Past psychiatric history significant for schizophrenia, anxiety, panic disorder, trichotillomania  The patient was on Abilify Maintena, olanzapine and diazepam  Past medications: Alprazolam, Lexapro, lamotrigine, Saphris, Risperdal, Prozac, Zoloft, Vraylar, Seroquel, Invega      Review of Systems   Review of systems could not be obtained due to   patient sedation status.    History    Past Medical History:   Diagnosis Date    Abdominal pain, epigastric 06/20/2017    Borderline personality disorder     Chronic pain syndrome 09/11/2017    Contraceptive management 01/23/2018    Dysuria 11/30/2017    Genital herpes 11/20/2017    High risk sexual behavior 11/20/2017    HPV (human papilloma virus) anogenital infection     Irregular menstruation 11/30/2017    refer to OB/GYN for further management 11-    Migraine     Panic disorder     PONV (postoperative  nausea and vomiting)     Poor compliance with medication 02/21/2019    Positive skin test for tuberculosis     Tuberculosis positive. STATES CXR NORMAL NEVER TREATED WITH MEDS    Pyelonephritis, acute 06/20/2017    Improved 06-    Right knee pain 01/23/2018    Discussed symptomatic treatment, activity as tolerate. 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    Vaginitis 11/20/2017    Withdrawal symptoms, drug or narcotic           Family History   Problem Relation Age of Onset    Other Mother         Tumor    Breast cancer Maternal Aunt     Breast cancer Maternal Grandmother     Ovarian cancer Neg Hx     Uterine cancer Neg Hx     Colon cancer Neg Hx     Malig Hyperthermia Neg Hx         Social History     Tobacco Use    Smoking status: Every Day     Current packs/day: 2.00     Average packs/day: 2.0 packs/day for 15.0 years (30.0 ttl pk-yrs)     Types: Cigarettes    Smokeless tobacco: Never    Tobacco comments:     Passive Smoke: Y   Vaping Use    Vaping status: Former    Substances: Flavoring    Devices: Disposable, Pre-filled pod   Substance Use Topics    Alcohol use: No    Drug use: Not Currently     Types: Cocaine(coke), Benzodiazepines, Oxycodone, Hydrocodone, Marijuana     Comment: 9 years sober          Medications Prior to Admission   Medication Sig Dispense Refill Last Dose/Taking    ARIPiprazole ER (Abilify Maintena) 400 MG prefilled syringe IM prefilled syringe Inject 400 mg into the appropriate muscle as directed by prescriber Every 28 (Twenty-Eight) Days. 1 each 5 Taking    gabapentin (NEURONTIN) 800 MG tablet Take 1 tablet by mouth 4 (Four) Times a Day. 360 tablet 0 Taking    cloNIDine (CATAPRES) 0.2 MG tablet Take 1 tablet by mouth 2 (Two) Times a Day. for anxiety 60 tablet 1     diazePAM (VALIUM) 10 MG tablet Take 1 tablet by mouth 3 (Three) Times  "a Day As Needed for Anxiety. for anxiety 90 tablet 0     docusate sodium (Colace) 100 MG capsule Take 1 capsule by mouth 2 (Two) Times a Day. 60 capsule 1     medroxyPROGESTERone (DEPO-PROVERA) 150 MG/ML injection Inject 1 mL into the appropriate muscle as directed by prescriber Every 3 (Three) Months. 1 mL 3     OLANZapine (zyPREXA) 10 MG tablet Take 1 tablet by mouth 2 (Two) Times a Day. 180 tablet 1     rizatriptan (MAXALT) 5 MG tablet Take 1 tablet by mouth 1 (One) Time As Needed for Migraine. May repeat in 2 hours if needed 12 tablet 2         Scheduled Meds:  enoxaparin sodium, 40 mg, Subcutaneous, Q24H  gabapentin, 800 mg, Oral, Q8H  OLANZapine, 10 mg, Oral, BID  sodium chloride, 10 mL, Intravenous, Q12H         Continuous Infusions:  lactated ringers, 100 mL/hr, Last Rate: 100 mL/hr (06/14/25 1013)        PRN Meds:    acetaminophen    senna-docusate sodium **AND** polyethylene glycol **AND** bisacodyl **AND** bisacodyl    Calcium Replacement - Follow Nurse / BPA Driven Protocol    diazePAM    Magnesium Standard Dose Replacement - Follow Nurse / BPA Driven Protocol    methocarbamol    ondansetron ODT **OR** ondansetron    Phosphorus Replacement - Follow Nurse / BPA Driven Protocol    Potassium Replacement - Follow Nurse / BPA Driven Protocol    [COMPLETED] Insert Peripheral IV **AND** sodium chloride    sodium chloride    sodium chloride    SUMAtriptan    traMADol      Allergies:  Morphine, Haloperidol, Aluminum silicate, and Pedi-pre tape spray [wound dressing adhesive]      Objective     Vital Signs   /68   Pulse 88   Temp 98.6 °F (37 °C) (Oral)   Resp 17   Ht 167.6 cm (66\")   Wt 76.3 kg (168 lb 3.4 oz)   SpO2 95%   BMI 27.15 kg/m²     Physical Exam:    Musculoskeletal:   Muscle strength and tone: Decreased in upper extremities  Abnormal Movements: None observed  Gait: Unable to assess, patient was in bed sleeping     General Appearance:  No acute distress     Mental Status Exam:   Hygiene:   " "fair  Cooperation:  Unable to cooperate  Eye Contact:  Closed  Behavior and Psychomotor Activity: Slow  Speech:  The patient was sleeping  Mood: Unable to answer  Affect:  Restricted  Thought Process:  Unable to demonstrate  Associations: Unable to assess  Thought Content:  Unable to assess  Language: Unable to assess, the patient was sleeping  Suicidal Ideations:  Did not express  Homicidal:  None  Hallucinations:  Auditory and Visual  Delusion:  Unable to demonstrate  Orientation:  Unable to evaluate  Memory:  Unable to evaluate  Concentration and computation: Unable to assess  Attention span: Unable to assess  Fund of knowledge: Unable to assess at present time  Reliability:  poor  Insight:  Poor  Judgement:  Impaired  Impulse Control:  Poor      Medications and allergies reviewed      Lab Results   Component Value Date    GLUCOSE 102 (H) 06/14/2025    CALCIUM 9.0 06/14/2025     06/14/2025    K 3.9 06/14/2025    CO2 23.9 06/14/2025     06/14/2025    BUN 24.0 (H) 06/14/2025    CREATININE 0.83 06/14/2025    EGFRIFNONA 83 02/06/2021    BCR 28.9 (H) 06/14/2025    ANIONGAP 12.1 06/14/2025       Last Urine Toxicity  More data exists         Latest Ref Rng & Units 6/14/2025 12/18/2023   LAST URINE TOXICITY RESULTS   Creatinine, Urine 20.0 - 300.0 mg/dL - 52.6    Amphetamine, Urine Qual Negative Negative  Negative    Barbiturates Screen, Urine Negative Negative  Negative    Benzodiazepine Screen, Urine Negative Positive  Negative    Buprenorphine, Screen, Urine Negative Negative  -   Cocaine Screen, Urine Negative Positive  Negative    Fentanyl, Urine Slxiiu=7024 pg/mL - Negative    Methadone Screen , Urine Negative Negative  Negative    Methamphetamine, Ur Negative Negative  -       No results found for: \"PHENYTOIN\", \"PHENOBARB\", \"VALPROATE\", \"CBMZ\"    Lab Results   Component Value Date     06/14/2025    BUN 24.0 (H) 06/14/2025    CREATININE 0.83 06/14/2025    TSH 1.240 01/11/2024    WBC 13.48 (H) " 06/14/2025       Brief Urine Lab Results  (Last result in the past 365 days)        Color   Clarity   Blood   Leuk Est   Nitrite   Protein   CREAT   Urine HCG        06/14/25 0855 Yellow   Cloudy   Negative   Negative   Negative   30 mg/dL (1+)                   Assessment & Plan       Psychosis     Assessment: Paranoid schizophrenia, generalized anxiety disorder, panic attacks, cocaine abuse  Treatment Plan: The patient presented with seizure activity, she appeared to be responding to internal stimuli, experienced auditory and visual hallucinations most likely related to cocaine use  The patient is on long-acting injectable Abilify Maintena-unable to confirm the date of last injection, unable to verify, no emergency contacts on file  Last refill for Abilify Maintena was sent to the pharmacy on 5/19/2025, but it is unknown when it was administered.  Continue olanzapine  Diazepam 10 mg #90 was filled on 5/21/2025 according to [facility]  Restart diazepam 5 mg po TID   Cont to provide support,will follow   Treatment Plan discussed with: nursing     I discussed the patients findings and my recommendations with nursing staff    I have reviewed and approved the behavioral health treatment plans and problem list. Yes  Thank you for the consult   Referring MD has access to consult report and progress notes in EMR       This document has been electronically signed by Sandi Esparza MD  June 14, 2025 14:59 EDT    Part of this note may be an electronic transcription/translation of spoken language to printed text using the Dragon Dictation System.

## 2025-06-14 NOTE — H&P
[facility]  History & Physical    Patient Name: Annmarie Perera  : 1990  MRN: [id number]  Primary Care Physician:  Meg Huff APRN  Date of admission: 2025  Date and Time of Service: 2025 at 10:00 EDT    Subjective      Chief Complaint:   Chief Complaint   Patient presents with    Seizures     EMS reports that pt's family witnessed grand mal seizure activity, pt alert and oriented to self only, pt denies head or neck pain. Per EMS pt has not taken her zyprexa today, pt has had episodes like this before per family report to EMS.        History of Present Illness: Annmarie Perera is a 34 y.o. female with a CMH of bipolar, schizophrenia who presented to [facility] on 2025 with Seizures (EMS reports that pt's family witnessed grand mal seizure activity, pt alert and oriented to self only, pt denies head or neck pain. Per EMS pt has not taken her zyprexa today, pt has had episodes like this before per family report to EMS.)     - Was unable to obtain significant history from the patient as she would not want to discuss and wanted to sleep.  She was alert to self, did not know her age or what year it was.  Patient put blanket over her head and refused any further questioning.  - Family was concerned that patient had not been taking medications in the outpatient setting as she had a known history of schizophrenia.  They also were concerned that patient may have had some shaking type jerking movements of her body concerning for a seizure.  Brought the patient into the hospital for further evaluation via EMS.  - Per nursing staff patient has been complaining of hallucination with people coming to get her.  She denies any self-harm or homicidal ideation.  Patient has been complaining of both audible/visual hallucinations.  No further seizure like activity noted at bedside.  - No patient contact information listed in the chart and no family in the room.  History mainly obtained from EMR, ER provider, and nursing.    Vital signs stable.  Labs significant for cocaine use, mild leukocytosis, benzodiazepine use.  CT head negative for acute intracranial processes noted intracranial hemorrhage.    Review of Systems   Constitutional:  Positive for fatigue.   Neurological:  Positive for seizures.   Psychiatric/Behavioral:  Positive for behavioral problems, confusion and decreased concentration.    Limited due to lack of patient cooperation    Personal History     Past Medical History:   Diagnosis Date    Abdominal pain, epigastric 06/20/2017    Borderline personality disorder     Chronic pain syndrome 09/11/2017    Contraceptive management 01/23/2018    Dysuria 11/30/2017    Genital herpes 11/20/2017    High risk sexual behavior 11/20/2017    HPV (human papilloma virus) anogenital infection     Irregular menstruation 11/30/2017    refer to OB/GYN for further management 11-    Migraine     Panic disorder     PONV (postoperative nausea and vomiting)     Poor compliance with medication 02/21/2019    Positive skin test for tuberculosis     Tuberculosis positive. STATES CXR NORMAL NEVER TREATED WITH MEDS    Pyelonephritis, acute 06/20/2017    Improved 06-    Right knee pain 01/23/2018    Discussed symptomatic treatment, activity as tolerate. 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    Vaginitis 11/20/2017    Withdrawal symptoms, drug or narcotic        Past Surgical History:   Procedure Laterality Date    CERVICAL BIOPSY  W/ LOOP ELECTRODE EXCISION      PAP SMEAR  2017    [facility]raction from [facility]    SALPINGECTOMY Bilateral 6/4/2024    Procedure: SALPINGECTOMY LAPAROSCOPIC;  Surgeon: Mima Larsen MD;  Location: [facility];  Service: Obstetrics/Gynecology;  Laterality: Bilateral;        Family History: family history includes Breast cancer in her maternal aunt and maternal grandmother; Other in her mother. Otherwise pertinent FHx was reviewed and not pertinent to current issue.    Social History:  reports that she has been smoking cigarettes. She has a 30 pack-year smoking history. She has never used smokeless tobacco. She reports that she does not currently use drugs after having used the following drugs: Cocaine(coke), Benzodiazepines, Oxycodone, Hydrocodone, and Marijuana. She reports that she does not drink alcohol.    Home Medications:  Prior to Admission Medications       Prescriptions Last Dose Informant Patient Reported? Taking?    ARIPiprazole ER (Abilify Maintena) 400 MG prefilled syringe IM prefilled syringe   No Yes    Inject 400 mg into the appropriate muscle as directed by prescriber Every 28 (Twenty-Eight) Days.    gabapentin (NEURONTIN) 800 MG tablet   No Yes    Take 1 tablet by mouth 4 (Four) Times a Day.    cloNIDine (CATAPRES) 0.2 MG tablet   No No    Take 1 tablet by mouth 2 (Two) Times a Day. for anxiety    diazePAM (VALIUM) 10 MG tablet   No No    Take 1 tablet by mouth 3 (Three) Times a Day As Needed for Anxiety. for anxiety    docusate sodium (Colace) 100 MG capsule   No No    Take 1 capsule by mouth 2 (Two) Times a Day.    medroxyPROGESTERone (DEPO-PROVERA) 150 MG/ML injection   No No    Inject 1 mL into the appropriate muscle as directed by prescriber Every 3 (Three) Months.    OLANZapine (zyPREXA) 10 MG tablet   No No    Take 1 tablet by mouth 2 (Two) Times a Day.    rizatriptan (MAXALT) 5 MG tablet   No No    Take 1 tablet by mouth 1 (One) Time As Needed for Migraine. May repeat in 2 hours if needed              Allergies:  Allergies   Allergen Reactions    Morphine Irritability    Haloperidol Mental Status Change and Other (See Comments)     [facility]    Aluminum Silicate Unknown - Low Severity    Pedi-Pre Tape Spray [Wound Dressing Adhesive]  Rash       Objective      Vitals:   Temp:  [98.6 °F (37 °C)] 98.6 °F (37 °C)  Heart Rate:  [98] 98  Resp:  [17] 17  BP: (110)/(61) 110/61  There is no height or weight on file to calculate BMI.  Physical Exam  Vitals and nursing note reviewed.   Constitutional:       General: She is not in acute distress.     Appearance: Normal appearance. She is normal weight. She is ill-appearing. She is not toxic-appearing.   HENT:      Head: Normocephalic and atraumatic.      Nose: Nose normal.      Mouth/Throat:      Mouth: Mucous membranes are moist.      Pharynx: Oropharynx is clear.   Eyes:      General: No scleral icterus.     Extraocular Movements: Extraocular movements intact.      Conjunctiva/sclera: Conjunctivae normal.   Cardiovascular:      Rate and Rhythm: Normal rate and regular rhythm.      Pulses: Normal pulses.      Heart sounds: Normal heart sounds. No murmur heard.     No friction rub. No gallop.   Pulmonary:      Effort: Pulmonary effort is normal. No respiratory distress.      Breath sounds: Normal breath sounds. No wheezing, rhonchi or rales.   Abdominal:      General: Abdomen is flat. Bowel sounds are normal. There is no distension.      Palpations: Abdomen is soft.      Tenderness: There is no abdominal tenderness. There is no guarding.   Musculoskeletal:         General: Normal range of motion.      Cervical back: Normal range of motion. No rigidity or tenderness.      Right lower leg: No edema.      Left lower leg: No edema.   Skin:     General: Skin is warm.      Coloration: Skin is not jaundiced or pale.   Neurological:      General: No focal deficit present.      Mental Status: She is alert. She is disoriented.   Psychiatric:         Attention and Perception: She is inattentive. She perceives auditory and visual hallucinations.         Behavior: Behavior is uncooperative and withdrawn.         Thought Content: Thought content is paranoid.         Cognition and Memory: Memory is impaired. She exhibits  impaired recent memory and impaired remote memory.         Judgment: Judgment is impulsive and inappropriate.         Diagnostic Data:  Lab Results (last 24 hours)       Procedure Component Value Units Date/Time    Urine Drug Screen - Straight Cath [[id number]]  (Abnormal) Collected: 06/14/25 0855    Specimen: Urine from Straight Cath Updated: 06/14/25 0929     THC, Screen, Urine Negative     Phencyclidine (PCP), Urine Negative     Cocaine Screen, Urine Positive     Methamphetamine, Ur Negative     Opiate Screen Negative     Amphetamine Screen, Urine Negative     Benzodiazepine Screen, Urine Positive     Tricyclic Antidepressants Screen Negative     Methadone Screen, Urine Negative     Barbiturates Screen, Urine Negative     Oxycodone Screen, Urine Negative     Buprenorphine, Screen, Urine Negative    Narrative:      Cutoff For Drugs Screened:    Amphetamines               500 ng/ml  Barbiturates               200 ng/ml  Benzodiazepines            150 ng/ml  Cocaine                    150 ng/ml  Methadone                  200 ng/ml  Opiates                    100 ng/ml  Phencyclidine               25 ng/ml  THC                         50 ng/ml  Methamphetamine            500 ng/ml  Tricyclic Antidepressants  300 ng/ml  Oxycodone                  100 ng/ml  Buprenorphine               10 ng/ml    The normal value for all drugs tested is negative. This report includes unconfirmed screening results, with the cutoff values listed, to be used for medical treatment purposes only.  Unconfirmed results must not be used for non-medical purposes such as employment or legal testing.  Clinical consideration should be applied to any drug of abuse test, particularly when unconfirmed results are used.    All urine drugs of abuse requests without chain of custody are for medical screening purposes only.  False positives are possible.      Comprehensive Metabolic Panel [[id number]]  (Abnormal) Collected: 06/14/25 0827    Specimen:  Blood Updated: 06/14/25 0855     Glucose 102 mg/dL      BUN 24.0 mg/dL      Creatinine 0.83 mg/dL      Sodium 140 mmol/L      Potassium 3.9 mmol/L      Chloride 104 mmol/L      CO2 23.9 mmol/L      Calcium 9.0 mg/dL      Total Protein 7.2 g/dL      Albumin 4.7 g/dL      ALT (SGPT) 12 U/L      AST (SGOT) 17 U/L      Alkaline Phosphatase 70 U/L      Total Bilirubin 0.2 mg/dL      Globulin 2.5 gm/dL      A/G Ratio 1.9 g/dL      BUN/Creatinine Ratio 28.9     Anion Gap 12.1 mmol/L      eGFR 93.8 mL/min/1.73     Narrative:      GFR Categories in Chronic Kidney Disease (CKD)              GFR Category          GFR (mL/min/1.73)    Interpretation  G1                    90 or greater        Normal or high (1)  G2                    60-89                Mild decrease (1)  G3a                   45-59                Mild to moderate decrease  G3b                   30-44                Moderate to severe decrease  G4                    15-29                Severe decrease  G5                    14 or less           Kidney failure    (1)In the absence of evidence of kidney disease, neither GFR category G1 or G2 fulfill the criteria for CKD.    eGFR calculation 2021 CKD-EPI creatinine equation, which does not include race as a factor    CK [[id number]]  (Abnormal) Collected: 06/14/25 0827    Specimen: Blood Updated: 06/14/25 0855     Creatine Kinase 187 U/L     Extra Tubes [[id number]] Collected: 06/14/25 0827    Specimen: Blood, Venous Line Updated: 06/14/25 0846    Narrative:      The following orders were created for panel order Extra Tubes.  Procedure                               Abnormality         Status                     ---------                               -----------         ------                     Gold Top - SST[[id number]]                                   Final result                 Please view results for these tests on the individual orders.    Gold Top - SST [[id number]] Collected: 06/14/25 0827    Specimen:  Blood Updated: 06/14/25 0846     Extra Tube Hold for add-ons.     Comment: Auto resulted.       CBC & Differential [[id number]]  (Abnormal) Collected: 06/14/25 0827    Specimen: Blood Updated: 06/14/25 0837    Narrative:      The following orders were created for panel order CBC & Differential.  Procedure                               Abnormality         Status                     ---------                               -----------         ------                     CBC Auto Differential[[id number]]        Abnormal            Final result                 Please view results for these tests on the individual orders.    CBC Auto Differential [[id number]]  (Abnormal) Collected: 06/14/25 0827    Specimen: Blood Updated: 06/14/25 0837     WBC 13.48 10*3/mm3      RBC 4.09 10*6/mm3      Hemoglobin 12.9 g/dL      Hematocrit 40.4 %      MCV 98.8 fL      MCH 31.5 pg      MCHC 31.9 g/dL      RDW 12.4 %      RDW-SD 44.8 fl      MPV 9.6 fL      Platelets 298 10*3/mm3      Neutrophil % 88.2 %      Lymphocyte % 6.5 %      Monocyte % 4.4 %      Eosinophil % 0.2 %      Basophil % 0.2 %      Immature Grans % 0.5 %      Neutrophils, Absolute 11.88 10*3/mm3      Lymphocytes, Absolute 0.88 10*3/mm3      Monocytes, Absolute 0.59 10*3/mm3      Eosinophils, Absolute 0.03 10*3/mm3      Basophils, Absolute 0.03 10*3/mm3      Immature Grans, Absolute 0.07 10*3/mm3      nRBC 0.0 /100 WBC              Imaging Results (Last 24 Hours)       Procedure Component Value Units Date/Time    CT Head Without Contrast [[id number]] Collected: 06/14/25 0854     Updated: 06/14/25 0859    Narrative:      CT HEAD WO CONTRAST    Date of Exam: 6/14/2025 8:40 AM EDT    Indication: poss sz.    Comparison: Head CT 1/31/2024.    Technique: Axial CT images were obtained of the head without contrast administration.  Coronal reconstructions were performed.  Automated exposure control and iterative reconstruction methods were used.    Findings:  Motion-degraded exam. No  evidence of acute intracranial hemorrhage or mass effect. No extra-axial collection. The gray white matter differentiation is preserved. Ventricles and sulci are symmetric.    The mastoid air cells and paranasal sinuses are well aerated. Globes and extraocular muscles are unremarkable. No acute or suspicious osseous abnormality. Soft tissues within normal limits.      Impression:      Impression:   Suboptimal exam due to motion degradation. Within these confines, no gross evidence of acute intracranial process.      Electronically Signed: Justen Abrams MD    6/14/2025 8:57 AM EDT    Workstation ID: [id number]              Assessment & Plan        This is a 34 y.o. female with:    Active and Resolved Problems  Active Hospital Problems    Diagnosis  POA    **Psychosis [F29]  Yes      Resolved Hospital Problems   No resolved problems to display.       Concern for acute psychosis in the setting of known schizophrenia and bipolar  - Concerned about medication noncompliance in the outpatient setting  - Psychiatry consulted for evaluation management  - Restart the patient's home Zyprexa.  - Home likely injectable Abilify not on formulary, will defer to psychiatry in regards to treatment plan    Altered mental status likely secondary to underlying psychological condition along with cocaine use and benzodiazepine use  - IV fluid  - Continue treatment as above for psychosis  - Encouraged discontinuation of cocaine.  If patient were to continue to require benzodiazepine will need to closely monitor for any sedating behavior pattern and adjust medication as tolerated.    Leukocytosis  - Can be reactive in nature.  Holding of antibiotics in the setting of no obvious sources of infection.  Continue to monitor, low threshold to start antibiotics.  - Pending UA    Hypophosphatemia-phosphorus replacement    VTE Prophylaxis:  Pharmacologic VTE prophylaxis orders are present.        The patient desires to be as follows:    CODE STATUS:     Code Status (Patient has no pulse and is not breathing): CPR (Attempt to Resuscitate)  Medical Interventions (Patient has pulse or is breathing): Full Support  Level Of Support Discussed With: Patient      No patient contact listed in the chart. Pt unable to provide information at this time.     Admission Status:  I believe this patient meets Observation status.    Expected Length of Stay: 1-2 days     PDMP and Medication Dispenses via Sidebar reviewed and consistent with patient reported medications.    I discussed the patient's findings and my recommendations with patient and nursing staff.      Signature:     This document has been electronically signed by Beau Aguilar MD on June 14, 2025 10:00 EDT   [facility], [place], [facility]

## 2025-06-14 NOTE — ED PROVIDER NOTES
Subjective   History of Present Illness  34-year-old female presents after EMS was called for seizure.  Initial history limited.  Patient reports she did take Valium this morning although when family arrived they believe she ran out of medicine 5 days ago.  They report that she was jerking.  She has been hearing voices talking to herself.  She had hit her head on the floor.  She has history of schizophrenia and reports that she has been coming more unstable recently.  Review of Systems    Past Medical History:   Diagnosis Date    Abdominal pain, epigastric 06/20/2017    Borderline personality disorder     Chronic pain syndrome 09/11/2017    Contraceptive management 01/23/2018    Dysuria 11/30/2017    Genital herpes 11/20/2017    High risk sexual behavior 11/20/2017    HPV (human papilloma virus) anogenital infection     Irregular menstruation 11/30/2017    refer to OB/GYN for further management 11-    Migraine     Panic disorder     PONV (postoperative nausea and vomiting)     Poor compliance with medication 02/21/2019    Positive skin test for tuberculosis     Tuberculosis positive. STATES CXR NORMAL NEVER TREATED WITH MEDS    Pyelonephritis, acute 06/20/2017    Improved 06-    Right knee pain 01/23/2018    Discussed symptomatic treatment, activity as tolerate. 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    Vaginitis 11/20/2017    Withdrawal symptoms, drug or narcotic        Allergies   Allergen Reactions    Morphine Irritability    Haloperidol Mental Status Change and Other (See Comments)     Abstraction from [facility]    Aluminum Silicate Unknown - Low Severity    Pedi-Pre Tape Spray [Wound Dressing Adhesive] Rash       Past Surgical History:   Procedure Laterality Date    CERVICAL BIOPSY  W/ LOOP ELECTRODE EXCISION      PAP SMEAR  2017    Abstraction  from [facility]    SALPINGECTOMY Bilateral 6/4/2024    Procedure: SALPINGECTOMY LAPAROSCOPIC;  Surgeon: Mima Larsen MD;  Location: [facility];  Service: Obstetrics/Gynecology;  Laterality: Bilateral;       Family History   Problem Relation Age of Onset    Other Mother         Tumor    Breast cancer Maternal Aunt     Breast cancer Maternal Grandmother     Ovarian cancer Neg Hx     Uterine cancer Neg Hx     Colon cancer Neg Hx     Malig Hyperthermia Neg Hx        Social History     Socioeconomic History    Marital status: Single   Tobacco Use    Smoking status: Every Day     Current packs/day: 2.00     Average packs/day: 2.0 packs/day for 15.0 years (30.0 ttl pk-yrs)     Types: Cigarettes    Smokeless tobacco: Never    Tobacco comments:     Passive Smoke: Y   Vaping Use    Vaping status: Former    Substances: Flavoring    Devices: Disposable, Pre-filled pod   Substance and Sexual Activity    Alcohol use: No    Drug use: Not Currently     Types: Cocaine(coke), Benzodiazepines, Oxycodone, Hydrocodone, Marijuana     Comment: 9 years sober    Sexual activity: Defer     Partners: Male     Birth control/protection: Depo-provera     Prior to Admission medications    Medication Sig Start Date End Date Taking? Authorizing Provider   ARIPiprazole ER (Abilify Maintena) 400 MG prefilled syringe IM prefilled syringe Inject 400 mg into the appropriate muscle as directed by prescriber Every 28 (Twenty-Eight) Days. 5/19/25   Batool Carter PA-C   clindamycin (CLEOCIN) 300 MG capsule Take 1 capsule by mouth 3 (Three) Times a Day. 9/1/24   Provider, MD Martha   cloNIDine (CATAPRES) 0.2 MG tablet Take 1 tablet by mouth 2 (Two) Times a Day. for anxiety 4/21/25   Batool Carter PA-C   diazePAM (VALIUM) 10 MG tablet Take 1 tablet by mouth 3 (Three) Times a Day As Needed for Anxiety. for anxiety 5/19/25   Batool Carter PA-C   docusate sodium (Colace) 100 MG capsule Take 1 capsule by mouth 2 (Two)  Times a Day. 6/4/24   Mima Larsen MD   gabapentin (NEURONTIN) 800 MG tablet Take 1 tablet by mouth 4 (Four) Times a Day. 5/19/25   Batool Carter PA-C   ketoconazole (NIZORAL) 2 % cream Apply 1 Application topically to the appropriate area as directed Daily. 9/1/24   Provider, MD Martha   medroxyPROGESTERone (DEPO-PROVERA) 150 MG/ML injection Inject 1 mL into the appropriate muscle as directed by prescriber Every 3 (Three) Months. 7/12/24   Meg Huff APRN   OLANZapine (zyPREXA) 10 MG tablet Take 1 tablet by mouth 2 (Two) Times a Day. 2/20/25   Batool Carter PA-C   rizatriptan (MAXALT) 5 MG tablet Take 1 tablet by mouth 1 (One) Time As Needed for Migraine. May repeat in 2 hours if needed 4/29/24   Valeria Addison MD   terbinafine (lamiSIL) 250 MG tablet Take 1 tablet by mouth Daily. 9/12/24   Meg Huff APRN             Objective   Physical Exam  34-year-old female awake.  Chronically ill in appearance.  Oropharynx there is minor contusion to tip of tongue noted.  Chest clear cardiovascular regular rhythm abdomen soft nontender neurologic exam she is awake but sleepy.  She will answer only minimal questions.  She does move all extremities.  She is not hyperreflexic.  She has no rigidity.  She does have spasmodic jerking of extremities.  This is not seizure.  Procedures           ED Course      Results for orders placed or performed during the hospital encounter of 06/14/25   Comprehensive Metabolic Panel    Collection Time: 06/14/25  8:27 AM    Specimen: Blood   Result Value Ref Range    Glucose 102 (H) 65 - 99 mg/dL    BUN 24.0 (H) 6.0 - 20.0 mg/dL    Creatinine 0.83 0.57 - 1.00 mg/dL    Sodium 140 136 - 145 mmol/L    Potassium 3.9 3.5 - 5.2 mmol/L    Chloride 104 98 - 107 mmol/L    CO2 23.9 22.0 - 29.0 mmol/L    Calcium 9.0 8.6 - 10.5 mg/dL    Total Protein 7.2 6.0 - 8.5 g/dL    Albumin 4.7 3.5 - 5.2 g/dL    ALT (SGPT) 12 1 - 33 U/L    AST (SGOT) 17 1 - 32 U/L    Alkaline  Phosphatase 70 39 - 117 U/L    Total Bilirubin 0.2 0.0 - 1.2 mg/dL    Globulin 2.5 gm/dL    A/G Ratio 1.9 g/dL    BUN/Creatinine Ratio 28.9 (H) 7.0 - 25.0    Anion Gap 12.1 5.0 - 15.0 mmol/L    eGFR 93.8 >60.0 mL/min/1.73   CK    Collection Time: 06/14/25  8:27 AM    Specimen: Blood   Result Value Ref Range    Creatine Kinase 187 (H) 20 - 180 U/L   CBC Auto Differential    Collection Time: 06/14/25  8:27 AM    Specimen: Blood   Result Value Ref Range    WBC 13.48 (H) 3.40 - 10.80 10*3/mm3    RBC 4.09 3.77 - 5.28 10*6/mm3    Hemoglobin 12.9 12.0 - 15.9 g/dL    Hematocrit 40.4 34.0 - 46.6 %    MCV 98.8 (H) 79.0 - 97.0 fL    MCH 31.5 26.6 - 33.0 pg    MCHC 31.9 31.5 - 35.7 g/dL    RDW 12.4 12.3 - 15.4 %    RDW-SD 44.8 37.0 - 54.0 fl    MPV 9.6 6.0 - 12.0 fL    Platelets 298 140 - 450 10*3/mm3    Neutrophil % 88.2 (H) 42.7 - 76.0 %    Lymphocyte % 6.5 (L) 19.6 - 45.3 %    Monocyte % 4.4 (L) 5.0 - 12.0 %    Eosinophil % 0.2 (L) 0.3 - 6.2 %    Basophil % 0.2 0.0 - 1.5 %    Immature Grans % 0.5 0.0 - 0.5 %    Neutrophils, Absolute 11.88 (H) 1.70 - 7.00 10*3/mm3    Lymphocytes, Absolute 0.88 0.70 - 3.10 10*3/mm3    Monocytes, Absolute 0.59 0.10 - 0.90 10*3/mm3    Eosinophils, Absolute 0.03 0.00 - 0.40 10*3/mm3    Basophils, Absolute 0.03 0.00 - 0.20 10*3/mm3    Immature Grans, Absolute 0.07 (H) 0.00 - 0.05 10*3/mm3    nRBC 0.0 0.0 - 0.2 /100 WBC   Gold Top - SST    Collection Time: 06/14/25  8:27 AM   Result Value Ref Range    Extra Tube Hold for add-ons.    Urine Drug Screen - Straight Cath    Collection Time: 06/14/25  8:55 AM    Specimen: Straight Cath; Urine   Result Value Ref Range    THC, Screen, Urine Negative Negative    Phencyclidine (PCP), Urine Negative Negative    Cocaine Screen, Urine Positive (A) Negative    Methamphetamine, Ur Negative Negative    Opiate Screen Negative Negative    Amphetamine Screen, Urine Negative Negative    Benzodiazepine Screen, Urine Positive (A) Negative    Tricyclic Antidepressants  Screen Negative Negative    Methadone Screen, Urine Negative Negative    Barbiturates Screen, Urine Negative Negative    Oxycodone Screen, Urine Negative Negative    Buprenorphine, Screen, Urine Negative Negative     CT Head Without Contrast   Final Result   Impression:    Suboptimal exam due to motion degradation. Within these confines, no gross evidence of acute intracranial process.         Electronically Signed: Justen Abrams MD     6/14/2025 8:57 AM EDT     Workstation ID: [id number]        Medications   sodium chloride 0.9 % flush 10 mL (has no administration in time range)   OLANZapine zydis (zyPREXA) disintegrating tablet 10 mg (has no administration in time range)   diazePAM (VALIUM) injection 5 mg (5 mg Intravenous Given 6/14/25 0848)     /61   Pulse 98   Temp 98.6 °F (37 °C) (Oral)   Resp 17   SpO2 96%                                                    Medical Decision Making  Amount and/or Complexity of Data Reviewed  Labs: ordered.  Radiology: ordered.    Risk  Prescription drug management.    Chart review: Patient was noted to have called psychiatrist on the 27th of last month asking for increase in her medication reports living with her mother and this extremely stressful.  Noted that she is already on Valium 10 mg 3 times a day.  Comorbidity: As per past history   Differential: Benzodiazepine withdrawal, schizophrenia, closed head injury,  My EKG interpretation: Not indicated  Lab: White count 13.4 with hemoglobin 12.9 platelet count 298 88 segs no bands CK borderline elevated 187 comprehensive metabolic panel remarkable for glucose of 102 and BUN of 24.  Urine DOA positive for cocaine and benzodiazepine  My Radiology review and interpretation: CT scan head has some motion artifact however no evidence of acute intracranial abnormality noted.  Gray-white differentiation appears preserved.  Discussion/treatment: Patient IV placed.  Was given Valium 5 mg IV.  While give Zyprexa 10 mg ODT.   Findings were discussed with patient's mother.  Patient was discussed with the hospitalist.  Will be admitted to their service with psychiatric consultation.  At this time it is not clear if she had seizure at home but it is possible that she did have benzodiazepine withdrawal seizure.  Patient was evaluated using appropriate PPE      Final diagnoses:   Schizophrenia, unspecified type   Benzodiazepine withdrawal with complication       ED Disposition  ED Disposition       ED Disposition   Decision to Admit    Condition   --    Comment   --               No follow-up provider specified.       Medication List      No changes were made to your prescriptions during this visit.            Junior Prajapati MD  06/14/25 0995

## 2025-06-14 NOTE — Clinical Note
Level of Care: Telemetry [5]   Admitting Physician: ALPA ANAND [[id number]]   Bed Request Comments: pcu

## 2025-06-15 LAB
ANION GAP SERPL CALCULATED.3IONS-SCNC: 8.5 MMOL/L (ref 5–15)
BASOPHILS # BLD AUTO: 0.03 10*3/MM3 (ref 0–0.2)
BASOPHILS NFR BLD AUTO: 0.5 % (ref 0–1.5)
BUN SERPL-MCNC: 16.6 MG/DL (ref 6–20)
BUN/CREAT SERPL: 28.1 (ref 7–25)
CALCIUM SPEC-SCNC: 8.1 MG/DL (ref 8.6–10.5)
CHLORIDE SERPL-SCNC: 108 MMOL/L (ref 98–107)
CO2 SERPL-SCNC: 24.5 MMOL/L (ref 22–29)
CREAT SERPL-MCNC: 0.59 MG/DL (ref 0.57–1)
D-LACTATE SERPL-SCNC: 1 MMOL/L (ref 0.5–2)
DEPRECATED RDW RBC AUTO: 43.8 FL (ref 37–54)
EGFRCR SERPLBLD CKD-EPI 2021: 120 ML/MIN/1.73
EOSINOPHIL # BLD AUTO: 0.11 10*3/MM3 (ref 0–0.4)
EOSINOPHIL NFR BLD AUTO: 1.7 % (ref 0.3–6.2)
ERYTHROCYTE [DISTWIDTH] IN BLOOD BY AUTOMATED COUNT: 12.5 % (ref 12.3–15.4)
GLUCOSE SERPL-MCNC: 107 MG/DL (ref 65–99)
HCT VFR BLD AUTO: 35.2 % (ref 34–46.6)
HGB BLD-MCNC: 11.5 G/DL (ref 12–15.9)
IMM GRANULOCYTES # BLD AUTO: 0.02 10*3/MM3 (ref 0–0.05)
IMM GRANULOCYTES NFR BLD AUTO: 0.3 % (ref 0–0.5)
LYMPHOCYTES # BLD AUTO: 2.23 10*3/MM3 (ref 0.7–3.1)
LYMPHOCYTES NFR BLD AUTO: 34.3 % (ref 19.6–45.3)
MCH RBC QN AUTO: 31.6 PG (ref 26.6–33)
MCHC RBC AUTO-ENTMCNC: 32.7 G/DL (ref 31.5–35.7)
MCV RBC AUTO: 96.7 FL (ref 79–97)
MONOCYTES # BLD AUTO: 0.47 10*3/MM3 (ref 0.1–0.9)
MONOCYTES NFR BLD AUTO: 7.2 % (ref 5–12)
NEUTROPHILS NFR BLD AUTO: 3.65 10*3/MM3 (ref 1.7–7)
NEUTROPHILS NFR BLD AUTO: 56 % (ref 42.7–76)
NRBC BLD AUTO-RTO: 0 /100 WBC (ref 0–0.2)
PLATELET # BLD AUTO: 273 10*3/MM3 (ref 140–450)
PMV BLD AUTO: 9.7 FL (ref 6–12)
POTASSIUM SERPL-SCNC: 3.9 MMOL/L (ref 3.5–5.2)
QT INTERVAL: 397 MS
QTC INTERVAL: 446 MS
RBC # BLD AUTO: 3.64 10*6/MM3 (ref 3.77–5.28)
SODIUM SERPL-SCNC: 141 MMOL/L (ref 136–145)
WBC NRBC COR # BLD AUTO: 6.51 10*3/MM3 (ref 3.4–10.8)

## 2025-06-15 PROCEDURE — 83605 ASSAY OF LACTIC ACID: CPT | Performed by: HOSPITALIST

## 2025-06-15 PROCEDURE — 25010000002 ENOXAPARIN PER 10 MG: Performed by: INTERNAL MEDICINE

## 2025-06-15 PROCEDURE — 25810000003 LACTATED RINGERS SOLUTION: Performed by: HOSPITALIST

## 2025-06-15 PROCEDURE — G0378 HOSPITAL OBSERVATION PER HR: HCPCS

## 2025-06-15 PROCEDURE — 85025 COMPLETE CBC W/AUTO DIFF WBC: CPT | Performed by: INTERNAL MEDICINE

## 2025-06-15 PROCEDURE — 99232 SBSQ HOSP IP/OBS MODERATE 35: CPT | Performed by: PSYCHIATRY & NEUROLOGY

## 2025-06-15 PROCEDURE — 93010 ELECTROCARDIOGRAM REPORT: CPT | Performed by: INTERNAL MEDICINE

## 2025-06-15 PROCEDURE — 80048 BASIC METABOLIC PNL TOTAL CA: CPT | Performed by: INTERNAL MEDICINE

## 2025-06-15 PROCEDURE — 93005 ELECTROCARDIOGRAM TRACING: CPT | Performed by: HOSPITALIST

## 2025-06-15 RX ADMIN — GABAPENTIN 800 MG: 400 CAPSULE ORAL at 21:19

## 2025-06-15 RX ADMIN — OLANZAPINE 10 MG: 5 TABLET, FILM COATED ORAL at 21:19

## 2025-06-15 RX ADMIN — DIAZEPAM 5 MG: 5 TABLET ORAL at 21:19

## 2025-06-15 RX ADMIN — OLANZAPINE 10 MG: 5 TABLET, FILM COATED ORAL at 10:59

## 2025-06-15 RX ADMIN — Medication 10 ML: at 21:20

## 2025-06-15 RX ADMIN — SODIUM CHLORIDE, POTASSIUM CHLORIDE, SODIUM LACTATE AND CALCIUM CHLORIDE 1000 ML: 600; 310; 30; 20 INJECTION, SOLUTION INTRAVENOUS at 11:19

## 2025-06-15 RX ADMIN — DIAZEPAM 5 MG: 5 TABLET ORAL at 15:55

## 2025-06-15 RX ADMIN — ENOXAPARIN SODIUM 40 MG: 100 INJECTION SUBCUTANEOUS at 15:55

## 2025-06-15 RX ADMIN — Medication 10 ML: at 10:10

## 2025-06-15 RX ADMIN — GABAPENTIN 800 MG: 400 CAPSULE ORAL at 15:55

## 2025-06-15 RX ADMIN — GABAPENTIN 800 MG: 400 CAPSULE ORAL at 06:05

## 2025-06-15 RX ADMIN — DIAZEPAM 5 MG: 5 TABLET ORAL at 09:00

## 2025-06-15 NOTE — PROGRESS NOTES
[facility] MEDICINE SERVICE  DAILY PROGRESS NOTE    NAME: Annmarie Perera  : 1990  MRN: [id number]      LOS: 0 days     PROVIDER OF SERVICE: Jordi Christianson MD    Chief Complaint: Psychosis    Subjective:     Interval History:  History taken from: patient chart family RN    Laying in bed no lightheadedness shortness of breath        Review of Systems:   Review of Systems  All negative except as above  Objective:     Vital Signs  Temp:  [97.5 °F (36.4 °C)-98.3 °F (36.8 °C)] 98.1 °F (36.7 °C)  Heart Rate:  [59-90] 67  Resp:  [12-16] 16  BP: ()/(41-68) 99/52   Body mass index is 27.04 kg/m².    Physical Exam  Physical Exam  AO x 3 anxious  RRR S1-S2 normal  Lungs with fair air entry  Abdomen soft nontender nondistended     Diagnostic Data    Results from last 7 days   Lab Units 06/15/25  0328 25  0827   WBC 10*3/mm3 6.51 13.48*   HEMOGLOBIN g/dL 11.5* 12.9   HEMATOCRIT % 35.2 40.4   PLATELETS 10*3/mm3 273 298   GLUCOSE mg/dL 107* 102*   CREATININE mg/dL 0.59 0.83   BUN mg/dL 16.6 24.0*   SODIUM mmol/L 141 140   POTASSIUM mmol/L 3.9 3.9   AST (SGOT) U/L  --  17   ALT (SGPT) U/L  --  12   ALK PHOS U/L  --  70   BILIRUBIN mg/dL  --  0.2   ANION GAP mmol/L 8.5 12.1       CT Head Without Contrast  Result Date: 2025  Impression: Suboptimal exam due to motion degradation. Within these confines, no gross evidence of acute intracranial process. Electronically Signed: Justen Abrams MD  2025 8:57 AM EDT  Workstation ID: [id number]        I reviewed the patient's new clinical results.  I reviewed the patient's new imaging results and agree with the interpretation.    Assessment/Plan:     Active and Resolved Problems  Active Hospital Problems    Diagnosis  POA    **Psychosis [F29]  Yes      Resolved Hospital Problems   No resolved problems to display.       34-year-old female with history of bipolar disorder, schizophrenia admitted to [facility]  with seizure-like  activity    #Paranoid schizophrenia  #NAINA  #Cocaine abuse  U-Tox positive for cocaine and benzodiazepines.  On diazepam at home    Psych following  Currently on olanzapine, diazepam and gabapentin  Counseling once more amenable    #Hypotension  BP soft patient largely asymptomatic  Lactic acid within normal limits.  No evidence of hypoperfusion  Continue IV fluids  No prior history of seizures.  Suspect in setting of cocaine use  Seizure precautions    #Electrolyte abnormalities  Replace lites check        VTE Prophylaxis:  Pharmacologic VTE prophylaxis orders are present.             Disposition Planning:     Barriers to Discharge: Medical/psych workup  Anticipated Date of Discharge: 1 to 2 days  Place of Discharge: Home      Time: 45 minutes     Code Status and Medical Interventions: CPR (Attempt to Resuscitate); Full Support   Ordered at: 06/14/25 0955     Code Status (Patient has no pulse and is not breathing):    CPR (Attempt to Resuscitate)     Medical Interventions (Patient has pulse or is breathing):    Full Support     Level Of Support Discussed With:    Patient       Signature: Electronically signed by Jordi Christianson MD, 06/15/25, 08:26 EDT.  [facility] Hospitalist Team

## 2025-06-15 NOTE — PLAN OF CARE
Problem: Adult Inpatient Plan of Care  Goal: Absence of Hospital-Acquired Illness or Injury  Intervention: Identify and Manage Fall Risk  Recent Flowsheet Documentation  Taken 6/15/2025 0200 by Rachel Atwood RN  Safety Promotion/Fall Prevention:   safety round/check completed   room organization consistent   lighting adjusted   nonskid shoes/slippers when out of bed   fall prevention program maintained   clutter free environment maintained   assistive device/personal items within reach  Taken 6/15/2025 0000 by Rachel Atwood RN  Safety Promotion/Fall Prevention:   safety round/check completed   room organization consistent   lighting adjusted   nonskid shoes/slippers when out of bed   fall prevention program maintained   clutter free environment maintained   assistive device/personal items within reach  Taken 6/14/2025 2200 by Rachel Atwood RN  Safety Promotion/Fall Prevention:   safety round/check completed   room organization consistent   lighting adjusted   nonskid shoes/slippers when out of bed   fall prevention program maintained   clutter free environment maintained   assistive device/personal items within reach  Taken 6/14/2025 2000 by Rachel Atwood RN  Safety Promotion/Fall Prevention:   safety round/check completed   room organization consistent   lighting adjusted   nonskid shoes/slippers when out of bed   fall prevention program maintained   clutter free environment maintained   assistive device/personal items within reach  Intervention: Prevent Skin Injury  Recent Flowsheet Documentation  Taken 6/15/2025 0200 by Rachel Atwood RN  Body Position: position changed independently  Taken 6/15/2025 0000 by Rachel Atwood RN  Body Position: position changed independently  Skin Protection: incontinence pads utilized  Taken 6/14/2025 2200 by Rachel Atwood RN  Body Position: position changed independently  Taken 6/14/2025 2000 by Rachel Atwood RN  Body Position: position  changed independently  Skin Protection: transparent dressing maintained  Intervention: Prevent and Manage VTE (Venous Thromboembolism) Risk  Recent Flowsheet Documentation  Taken 6/15/2025 0000 by Rachel Atwood RN  VTE Prevention/Management:   SCDs (sequential compression devices) off   patient refused intervention  Taken 6/14/2025 2000 by Rachel Atwood RN  VTE Prevention/Management:   SCDs (sequential compression devices) off   patient refused intervention  Intervention: Prevent Infection  Recent Flowsheet Documentation  Taken 6/15/2025 0200 by Rachel Atwood RN  Infection Prevention:   single patient room provided   rest/sleep promoted   hand hygiene promoted   equipment surfaces disinfected   environmental surveillance performed  Taken 6/15/2025 0000 by Rachel Atwood RN  Infection Prevention:   single patient room provided   rest/sleep promoted   hand hygiene promoted   equipment surfaces disinfected   environmental surveillance performed  Taken 6/14/2025 2200 by Rachel Atwood RN  Infection Prevention:   single patient room provided   rest/sleep promoted   hand hygiene promoted   equipment surfaces disinfected   environmental surveillance performed  Taken 6/14/2025 2000 by Rachel Atwood RN  Infection Prevention:   single patient room provided   rest/sleep promoted   hand hygiene promoted   equipment surfaces disinfected   environmental surveillance performed  Goal: Optimal Comfort and Wellbeing  Intervention: Provide Person-Centered Care  Recent Flowsheet Documentation  Taken 6/14/2025 2000 by Rachel Atwood RN  Trust Relationship/Rapport:   care explained   choices provided   questions answered   questions encouraged   reassurance provided   thoughts/feelings acknowledged  Goal: Readiness for Transition of Care  Intervention: Mutually Develop Transition Plan  Recent Flowsheet Documentation  Taken 6/15/2025 0029 by Rachel Atwood RN  Equipment Currently Used at Home:  none  Transportation Anticipated: (ANNA) other (see comments)  Patient/Family Anticipated Services at Transition: (ANNA) other (see comments)  Patient/Family Anticipates Transition to: (ANNA) other (see comments)  Goal: Absence of Hospital-Acquired Illness or Injury  Intervention: Identify and Manage Fall Risk  Recent Flowsheet Documentation  Taken 6/15/2025 0200 by Rachel Atwood RN  Safety Promotion/Fall Prevention:   safety round/check completed   room organization consistent   lighting adjusted   nonskid shoes/slippers when out of bed   fall prevention program maintained   clutter free environment maintained   assistive device/personal items within reach  Taken 6/15/2025 0000 by Rachel Atwood RN  Safety Promotion/Fall Prevention:   safety round/check completed   room organization consistent   lighting adjusted   nonskid shoes/slippers when out of bed   fall prevention program maintained   clutter free environment maintained   assistive device/personal items within reach  Taken 6/14/2025 2200 by Rachel Atwood RN  Safety Promotion/Fall Prevention:   safety round/check completed   room organization consistent   lighting adjusted   nonskid shoes/slippers when out of bed   fall prevention program maintained   clutter free environment maintained   assistive device/personal items within reach  Taken 6/14/2025 2000 by Rachel Atwood RN  Safety Promotion/Fall Prevention:   safety round/check completed   room organization consistent   lighting adjusted   nonskid shoes/slippers when out of bed   fall prevention program maintained   clutter free environment maintained   assistive device/personal items within reach  Intervention: Prevent Skin Injury  Recent Flowsheet Documentation  Taken 6/15/2025 0200 by Rachel Atwood RN  Body Position: position changed independently  Taken 6/15/2025 0000 by Rachel Atwood RN  Body Position: position changed independently  Skin Protection: incontinence pads  utilized  Taken 6/14/2025 2200 by Rachel Atwood RN  Body Position: position changed independently  Taken 6/14/2025 2000 by Rachel Atwood RN  Body Position: position changed independently  Skin Protection: transparent dressing maintained  Intervention: Prevent and Manage VTE (Venous Thromboembolism) Risk  Recent Flowsheet Documentation  Taken 6/15/2025 0000 by Rachel Atwood RN  VTE Prevention/Management:   SCDs (sequential compression devices) off   patient refused intervention  Taken 6/14/2025 2000 by Rachel Atwood RN  VTE Prevention/Management:   SCDs (sequential compression devices) off   patient refused intervention  Intervention: Prevent Infection  Recent Flowsheet Documentation  Taken 6/15/2025 0200 by Rachel Atwood RN  Infection Prevention:   single patient room provided   rest/sleep promoted   hand hygiene promoted   equipment surfaces disinfected   environmental surveillance performed  Taken 6/15/2025 0000 by Rachel Atwood RN  Infection Prevention:   single patient room provided   rest/sleep promoted   hand hygiene promoted   equipment surfaces disinfected   environmental surveillance performed  Taken 6/14/2025 2200 by Rachel Atwood RN  Infection Prevention:   single patient room provided   rest/sleep promoted   hand hygiene promoted   equipment surfaces disinfected   environmental surveillance performed  Taken 6/14/2025 2000 by Rachel Atwood RN  Infection Prevention:   single patient room provided   rest/sleep promoted   hand hygiene promoted   equipment surfaces disinfected   environmental surveillance performed  Goal: Optimal Comfort and Wellbeing  Intervention: Provide Person-Centered Care  Recent Flowsheet Documentation  Taken 6/14/2025 2000 by Rachel Atwood RN  Trust Relationship/Rapport:   care explained   choices provided   questions answered   questions encouraged   reassurance provided   thoughts/feelings acknowledged  Goal: Readiness for Transition of  Care  Intervention: Mutually Develop Transition Plan  Recent Flowsheet Documentation  Taken 6/15/2025 0029 by Rachel Atwood RN  Equipment Currently Used at Home: none  Transportation Anticipated: (ANNA) other (see comments)  Patient/Family Anticipated Services at Transition: (ANNA) other (see comments)  Patient/Family Anticipates Transition to: (ANNA) other (see comments)     Problem: Fall Injury Risk  Goal: Absence of Fall and Fall-Related Injury  Intervention: Identify and Manage Contributors  Recent Flowsheet Documentation  Taken 6/15/2025 0200 by Rachel Atwood RN  Medication Review/Management: medications reviewed  Taken 6/15/2025 0000 by Rachel Atwood RN  Medication Review/Management: medications reviewed  Self-Care Promotion: BADL personal objects within reach  Taken 6/14/2025 2200 by Rachel Atwood RN  Medication Review/Management: medications reviewed  Taken 6/14/2025 2000 by Rachel Atwood RN  Medication Review/Management: medications reviewed  Intervention: Promote Injury-Free Environment  Recent Flowsheet Documentation  Taken 6/15/2025 0200 by Rachel Atwood RN  Safety Promotion/Fall Prevention:   safety round/check completed   room organization consistent   lighting adjusted   nonskid shoes/slippers when out of bed   fall prevention program maintained   clutter free environment maintained   assistive device/personal items within reach  Taken 6/15/2025 0000 by Rachel Atwood RN  Safety Promotion/Fall Prevention:   safety round/check completed   room organization consistent   lighting adjusted   nonskid shoes/slippers when out of bed   fall prevention program maintained   clutter free environment maintained   assistive device/personal items within reach  Taken 6/14/2025 2200 by Rachel Atwood RN  Safety Promotion/Fall Prevention:   safety round/check completed   room organization consistent   lighting adjusted   nonskid shoes/slippers when out of bed   fall prevention  program maintained   clutter free environment maintained   assistive device/personal items within reach  Taken 6/14/2025 2000 by Rachel Atwood RN  Safety Promotion/Fall Prevention:   safety round/check completed   room organization consistent   lighting adjusted   nonskid shoes/slippers when out of bed   fall prevention program maintained   clutter free environment maintained   assistive device/personal items within reach     Problem: Comorbidity Management  Goal: Maintenance of Asthma Control  Intervention: Maintain Asthma Symptom Control  Recent Flowsheet Documentation  Taken 6/15/2025 0200 by Rachel Atwood RN  Medication Review/Management: medications reviewed  Taken 6/15/2025 0000 by Rachel Atwood RN  Medication Review/Management: medications reviewed  Taken 6/14/2025 2200 by Rachel Atwood RN  Medication Review/Management: medications reviewed  Taken 6/14/2025 2000 by Rachel Atwood RN  Medication Review/Management: medications reviewed  Goal: Maintenance of Behavioral Health Symptom Control  Intervention: Maintain Behavioral Health Symptom Control  Recent Flowsheet Documentation  Taken 6/15/2025 0200 by Rachel Atwood RN  Medication Review/Management: medications reviewed  Taken 6/15/2025 0000 by Rachel Atwood RN  Medication Review/Management: medications reviewed  Taken 6/14/2025 2200 by Rachel Atwood RN  Medication Review/Management: medications reviewed  Taken 6/14/2025 2000 by Rachel Atwood RN  Medication Review/Management: medications reviewed  Goal: Maintenance of COPD Symptom Control  Intervention: Maintain COPD (Chronic Obstructive Pulmonary Disease) Symptom Control  Recent Flowsheet Documentation  Taken 6/15/2025 0200 by Rachel Atwood RN  Medication Review/Management: medications reviewed  Taken 6/15/2025 0000 by Rachel Atwood RN  Medication Review/Management: medications reviewed  Taken 6/14/2025 2200 by Rachel Atwood RN  Medication  Review/Management: medications reviewed  Taken 6/14/2025 2000 by Rachel Atwood RN  Medication Review/Management: medications reviewed  Goal: Blood Glucose Level Within Target Range  Intervention: Monitor and Manage Glycemia  Recent Flowsheet Documentation  Taken 6/15/2025 0200 by Rachel Atwood RN  Medication Review/Management: medications reviewed  Taken 6/15/2025 0000 by Rachel Atwood RN  Medication Review/Management: medications reviewed  Taken 6/14/2025 2200 by Rachel Atwood RN  Medication Review/Management: medications reviewed  Taken 6/14/2025 2000 by Rachel Atwood RN  Medication Review/Management: medications reviewed  Goal: Maintenance of Heart Failure Symptom Control  Intervention: Maintain Heart Failure Management  Recent Flowsheet Documentation  Taken 6/15/2025 0200 by Rachel Atwood RN  Medication Review/Management: medications reviewed  Taken 6/15/2025 0000 by Rachel Atwood RN  Medication Review/Management: medications reviewed  Taken 6/14/2025 2200 by Rachel Atwood RN  Medication Review/Management: medications reviewed  Taken 6/14/2025 2000 by Rachel Atwood RN  Medication Review/Management: medications reviewed  Goal: Blood Pressure in Desired Range  Intervention: Maintain Blood Pressure Management  Recent Flowsheet Documentation  Taken 6/15/2025 0200 by Rachel Atwood RN  Medication Review/Management: medications reviewed  Taken 6/15/2025 0000 by Rachel Atwood RN  Medication Review/Management: medications reviewed  Taken 6/14/2025 2200 by Rachel Atwood RN  Medication Review/Management: medications reviewed  Taken 6/14/2025 2000 by Rachel Atwood RN  Medication Review/Management: medications reviewed  Goal: Maintenance of Osteoarthritis Symptom Control  Intervention: Maintain Osteoarthritis Symptom Control  Recent Flowsheet Documentation  Taken 6/15/2025 0200 by Rachel Atwood RN  Medication Review/Management: medications reviewed  Taken  6/15/2025 0000 by Rachel Atwood RN  Medication Review/Management: medications reviewed  Taken 6/14/2025 2200 by Rachel Atwood RN  Medication Review/Management: medications reviewed  Taken 6/14/2025 2000 by Rachel Atwood RN  Activity Management: ambulated in room  Medication Review/Management: medications reviewed  Goal: Bariatric Home Regimen Maintained  Intervention: Maintain and Manage Postbariatric Surgery Care  Recent Flowsheet Documentation  Taken 6/15/2025 0200 by Rachel Atwood RN  Medication Review/Management: medications reviewed  Taken 6/15/2025 0000 by Rachel Atwood RN  Medication Review/Management: medications reviewed  Taken 6/14/2025 2200 by Rachel Atwood RN  Medication Review/Management: medications reviewed  Taken 6/14/2025 2000 by Rachel Atwood RN  Medication Review/Management: medications reviewed  Goal: Maintenance of Seizure Control  Intervention: Maintain Seizure Symptom Control  Recent Flowsheet Documentation  Taken 6/15/2025 0200 by Rachel Atwood RN  Medication Review/Management: medications reviewed  Taken 6/15/2025 0000 by Rachel Atwood RN  Medication Review/Management: medications reviewed  Taken 6/14/2025 2200 by Rachel Atwood RN  Medication Review/Management: medications reviewed  Taken 6/14/2025 2000 by Rachel Atwood RN  Medication Review/Management: medications reviewed     Problem: Violence Risk or Actual  Goal: Anger and Impulse Control  Intervention: Minimize Safety Risk  Recent Flowsheet Documentation  Taken 6/15/2025 0200 by Rachel Atwood RN  De-Escalation Techniques: quiet time facilitated  Enhanced Safety Measures:   bed alarm set   room near unit station  Taken 6/15/2025 0000 by Rachel Atwood RN  De-Escalation Techniques: quiet time facilitated  Enhanced Safety Measures:   bed alarm set   room near unit station  Taken 6/14/2025 2200 by Rachel Atwood RN  De-Escalation Techniques: quiet time facilitated  Enhanced  Safety Measures:   bed alarm set   room near unit station  Taken 6/14/2025 2000 by Rachel Atwood RN  De-Escalation Techniques: quiet time facilitated  Enhanced Safety Measures:   bed alarm set   room near unit station   Goal Outcome Evaluation:

## 2025-06-15 NOTE — PROGRESS NOTES
Chief complaint depression, voices     Subjective .     History of present illness:  The patient is a 34 y.o. female who was admitted secondary to seizures. PMHx:bipolar, schizophrenia .  Psychiatric consult was requested by Junior Prajapati MD secondary to schizophrenia, hallucinations and benzodiazepine withdrawal.  The patient was unable to provide any information secondary to decreased level of consciousness.  According to the records, the patient had witnessed grand mal seizure activity.  Family expressed concerns that the patient had not been taking medications, she has no history of schizophrenia, the patient experienced hallucinations, she was feeling paranoid, believes people were coming to get her to.  The patient sees Batool Carter in virtual clinic.  Next appointment 7/1/2025, the patient canceled appointment on 5/21/2025, but she requested to increase Valium.  Past psychiatric history significant for schizophrenia, anxiety, panic disorder, trichotillomania  The patient was on Abilify Maintena, olanzapine and diazepam  Past medications: Alprazolam, Lexapro, lamotrigine, Saphris, Risperdal, Prozac, Zoloft, Vraylar, Seroquel, Invega    Today the pt  was somnolent but arousable, reported using cocaine recently, noticed increased intensity of voices when intoxicated   Depression 6/10, denied feeling hopeless/helpless   AH - just noises, denied hearing commands to harm self or others  Review of Systems   Pertinent items are noted in HPI, all other systems reviewed and negative     History     Medications Prior to Admission   Medication Sig Dispense Refill Last Dose/Taking    ARIPiprazole ER (Abilify Maintena) 400 MG prefilled syringe IM prefilled syringe Inject 400 mg into the appropriate muscle as directed by prescriber Every 28 (Twenty-Eight) Days. 1 each 5 Taking    gabapentin (NEURONTIN) 800 MG tablet Take 1 tablet by mouth 4 (Four) Times a Day. 360 tablet 0 Taking    cloNIDine (CATAPRES) 0.2 MG tablet  "Take 1 tablet by mouth 2 (Two) Times a Day. for anxiety 60 tablet 1     diazePAM (VALIUM) 10 MG tablet Take 1 tablet by mouth 3 (Three) Times a Day As Needed for Anxiety. for anxiety 90 tablet 0     docusate sodium (Colace) 100 MG capsule Take 1 capsule by mouth 2 (Two) Times a Day. 60 capsule 1     medroxyPROGESTERone (DEPO-PROVERA) 150 MG/ML injection Inject 1 mL into the appropriate muscle as directed by prescriber Every 3 (Three) Months. 1 mL 3     OLANZapine (zyPREXA) 10 MG tablet Take 1 tablet by mouth 2 (Two) Times a Day. 180 tablet 1     rizatriptan (MAXALT) 5 MG tablet Take 1 tablet by mouth 1 (One) Time As Needed for Migraine. May repeat in 2 hours if needed 12 tablet 2         Scheduled Meds:  diazePAM, 5 mg, Oral, TID  enoxaparin sodium, 40 mg, Subcutaneous, Q24H  gabapentin, 800 mg, Oral, Q8H  OLANZapine, 10 mg, Oral, BID  sodium chloride, 10 mL, Intravenous, Q12H         Continuous Infusions:       PRN Meds:    acetaminophen    senna-docusate sodium **AND** polyethylene glycol **AND** bisacodyl **AND** bisacodyl    Calcium Replacement - Follow Nurse / BPA Driven Protocol    diazePAM    Magnesium Standard Dose Replacement - Follow Nurse / BPA Driven Protocol    methocarbamol    ondansetron ODT **OR** ondansetron    Phosphorus Replacement - Follow Nurse / BPA Driven Protocol    Potassium Replacement - Follow Nurse / BPA Driven Protocol    [COMPLETED] Insert Peripheral IV **AND** sodium chloride    sodium chloride    sodium chloride    SUMAtriptan    traMADol      Allergies:  Morphine, Haloperidol, Aluminum silicate, and Pedi-pre tape spray [wound dressing adhesive]      Objective     Vital Signs   BP 97/65   Pulse 92   Temp 98.3 °F (36.8 °C) (Oral)   Resp 16   Ht 167.6 cm (66\")   Wt 76 kg (167 lb 8.8 oz)   LMP  (LMP Unknown)   SpO2 97%   BMI 27.04 kg/m²     Physical Exam:     General Appearance:    Somnolent but arousable         Mental Status Exam:    Hygiene:   fair  Cooperation:  " "Cooperative  Eye Contact:  Fair  Psychomotor Behavior:  Slow  Affect:  Restricted  Hopelessness: Denies  Speech:  Monotone  Thought Progress:  Goal directed  Thought Content:  Mood congruent  Suicidal:  None  Homicidal:  None  Hallucinations:  Auditory - just noises, no commands   Delusion:  None  Memory:  fair   Orientation:  Person, Place, and Situation  Reliability:  fair  Insight:  Fair  Judgement:  Impaired  Impulse Control:  Poor  Physical/Medical Issues:  Yes      Medications and allergies reviewed      Lab Results   Component Value Date    GLUCOSE 107 (H) 06/15/2025    CALCIUM 8.1 (L) 06/15/2025     06/15/2025    K 3.9 06/15/2025    CO2 24.5 06/15/2025     (H) 06/15/2025    BUN 16.6 06/15/2025    CREATININE 0.59 06/15/2025    EGFRIFNONA 83 02/06/2021    BCR 28.1 (H) 06/15/2025    ANIONGAP 8.5 06/15/2025       Last Urine Toxicity  More data exists         Latest Ref Rng & Units 6/14/2025 12/18/2023   LAST URINE TOXICITY RESULTS   Creatinine, Urine 20.0 - 300.0 mg/dL - 52.6    Amphetamine, Urine Qual Negative Negative  Negative    Barbiturates Screen, Urine Negative Negative  Negative    Benzodiazepine Screen, Urine Negative Positive  Negative    Buprenorphine, Screen, Urine Negative Negative  -   Cocaine Screen, Urine Negative Positive  Negative    Fentanyl, Urine Yilbwl=2911 pg/mL - Negative    Methadone Screen , Urine Negative Negative  Negative    Methamphetamine, Ur Negative Negative  -       No results found for: \"PHENYTOIN\", \"PHENOBARB\", \"VALPROATE\", \"CBMZ\"    Lab Results   Component Value Date     06/15/2025    BUN 16.6 06/15/2025    CREATININE 0.59 06/15/2025    TSH 1.240 01/11/2024    WBC 6.51 06/15/2025       Brief Urine Lab Results  (Last result in the past 365 days)        Color   Clarity   Blood   Leuk Est   Nitrite   Protein   CREAT   Urine HCG        06/14/25 0855 Yellow   Cloudy   Negative   Negative   Negative   30 mg/dL (1+)                   Assessment & Plan       " Psychosis       Assessment: Paranoid schizophrenia, generalized anxiety disorder, panic attacks, cocaine abuse   Treatment Plan: The patient presented with seizure activity, she appeared to be responding to internal stimuli, experienced auditory and visual hallucinations most likely related to cocaine use  The patient is on long-acting injectable Abilify Maintena-unable to confirm the date of last injection, unable to verify, the pt does not remember date when she received it , no emergency contacts on file  Last refill for Abilify Maintena was sent to the pharmacy on 5/19/2025, but it is unknown when it was administered.  Continue olanzapine  Diazepam 10 mg #90 was filled on 5/21/2025 according to [facility]  Restart diazepam 5 mg po TID to avoid benzo withdrawal and SZ   Cont to provide support,will follow   Treatment Plan discussed with: Patient and nursing     I discussed the patients findings and my recommendations with patient and nursing staff    I have reviewed and approved the behavioral health treatment plans and problem list. Yes     Referring MD has access to consult report and progress notes in EMR     Sandi Esparza MD  06/15/25  15:15 EDT

## 2025-06-15 NOTE — CASE MANAGEMENT/SOCIAL WORK
Continued Stay Note  [facility]     Patient Name: Annmarie Perera  MRN: [id number]  Today's Date: 6/15/2025    Admit Date: 6/14/2025    Plan: Needs full CM assessment. Plan pending clinical course.   Discharge Plan       Row Name 06/15/25 7568       Plan    Plan Needs full CM assessment. Plan pending clinical course.    Plan Comments DC barriers: Medical/psych work-up, psych following; medication adjustment             Cheri Martin RN BSN  Weekend   [facility]  Phone: [phone]  Fax: [phone]

## 2025-06-15 NOTE — PLAN OF CARE
Goal Outcome Evaluation:   Pt has been cooperative and calm, eating well. Hypotensive post vallum and Zyprexa. Pt was asymptomatic. Pt bolused 1 L NS. Pressures returned to normal range, MAP about 65. Lactic was 1.0.

## 2025-06-16 LAB
ANION GAP SERPL CALCULATED.3IONS-SCNC: 8.4 MMOL/L (ref 5–15)
BASOPHILS # BLD AUTO: 0.04 10*3/MM3 (ref 0–0.2)
BASOPHILS NFR BLD AUTO: 0.6 % (ref 0–1.5)
BUN SERPL-MCNC: 14.2 MG/DL (ref 6–20)
BUN/CREAT SERPL: 19.7 (ref 7–25)
CALCIUM SPEC-SCNC: 8.8 MG/DL (ref 8.6–10.5)
CHLORIDE SERPL-SCNC: 106 MMOL/L (ref 98–107)
CO2 SERPL-SCNC: 24.6 MMOL/L (ref 22–29)
CREAT SERPL-MCNC: 0.72 MG/DL (ref 0.57–1)
DEPRECATED RDW RBC AUTO: 44 FL (ref 37–54)
EGFRCR SERPLBLD CKD-EPI 2021: 111.3 ML/MIN/1.73
EOSINOPHIL # BLD AUTO: 0.13 10*3/MM3 (ref 0–0.4)
EOSINOPHIL NFR BLD AUTO: 1.9 % (ref 0.3–6.2)
ERYTHROCYTE [DISTWIDTH] IN BLOOD BY AUTOMATED COUNT: 12.3 % (ref 12.3–15.4)
GLUCOSE SERPL-MCNC: 92 MG/DL (ref 65–99)
HCT VFR BLD AUTO: 38.3 % (ref 34–46.6)
HGB BLD-MCNC: 12.6 G/DL (ref 12–15.9)
IMM GRANULOCYTES # BLD AUTO: 0.01 10*3/MM3 (ref 0–0.05)
IMM GRANULOCYTES NFR BLD AUTO: 0.1 % (ref 0–0.5)
LYMPHOCYTES # BLD AUTO: 2.16 10*3/MM3 (ref 0.7–3.1)
LYMPHOCYTES NFR BLD AUTO: 31.1 % (ref 19.6–45.3)
MCH RBC QN AUTO: 31.9 PG (ref 26.6–33)
MCHC RBC AUTO-ENTMCNC: 32.9 G/DL (ref 31.5–35.7)
MCV RBC AUTO: 97 FL (ref 79–97)
MONOCYTES # BLD AUTO: 0.51 10*3/MM3 (ref 0.1–0.9)
MONOCYTES NFR BLD AUTO: 7.3 % (ref 5–12)
NEUTROPHILS NFR BLD AUTO: 4.09 10*3/MM3 (ref 1.7–7)
NEUTROPHILS NFR BLD AUTO: 59 % (ref 42.7–76)
NRBC BLD AUTO-RTO: 0 /100 WBC (ref 0–0.2)
PLATELET # BLD AUTO: 281 10*3/MM3 (ref 140–450)
PMV BLD AUTO: 9.9 FL (ref 6–12)
POTASSIUM SERPL-SCNC: 4.3 MMOL/L (ref 3.5–5.2)
RBC # BLD AUTO: 3.95 10*6/MM3 (ref 3.77–5.28)
SODIUM SERPL-SCNC: 139 MMOL/L (ref 136–145)
WBC NRBC COR # BLD AUTO: 6.94 10*3/MM3 (ref 3.4–10.8)

## 2025-06-16 PROCEDURE — 25010000002 ENOXAPARIN PER 10 MG: Performed by: INTERNAL MEDICINE

## 2025-06-16 PROCEDURE — 99232 SBSQ HOSP IP/OBS MODERATE 35: CPT

## 2025-06-16 PROCEDURE — 85025 COMPLETE CBC W/AUTO DIFF WBC: CPT | Performed by: INTERNAL MEDICINE

## 2025-06-16 PROCEDURE — 80048 BASIC METABOLIC PNL TOTAL CA: CPT | Performed by: INTERNAL MEDICINE

## 2025-06-16 RX ORDER — OLANZAPINE 5 MG/1
15 TABLET, FILM COATED ORAL 2 TIMES DAILY
Status: DISCONTINUED | OUTPATIENT
Start: 2025-06-16 | End: 2025-06-17 | Stop reason: HOSPADM

## 2025-06-16 RX ADMIN — GABAPENTIN 800 MG: 400 CAPSULE ORAL at 13:33

## 2025-06-16 RX ADMIN — GABAPENTIN 800 MG: 400 CAPSULE ORAL at 05:34

## 2025-06-16 RX ADMIN — Medication 10 ML: at 08:30

## 2025-06-16 RX ADMIN — OLANZAPINE 15 MG: 5 TABLET, FILM COATED ORAL at 20:38

## 2025-06-16 RX ADMIN — DIAZEPAM 5 MG: 5 TABLET ORAL at 08:29

## 2025-06-16 RX ADMIN — Medication 10 ML: at 20:42

## 2025-06-16 RX ADMIN — ENOXAPARIN SODIUM 40 MG: 100 INJECTION SUBCUTANEOUS at 16:33

## 2025-06-16 RX ADMIN — OLANZAPINE 10 MG: 5 TABLET, FILM COATED ORAL at 08:29

## 2025-06-16 RX ADMIN — DIAZEPAM 5 MG: 5 TABLET ORAL at 20:38

## 2025-06-16 RX ADMIN — GABAPENTIN 800 MG: 400 CAPSULE ORAL at 22:19

## 2025-06-16 RX ADMIN — DIAZEPAM 5 MG: 5 TABLET ORAL at 15:07

## 2025-06-16 RX ADMIN — TRAMADOL HYDROCHLORIDE 50 MG: 50 TABLET, COATED ORAL at 18:33

## 2025-06-16 NOTE — PLAN OF CARE
Goal Outcome Evaluation:      Patient cooperative with care. A & O x 4. Patient up ad bernardino. Able to make needs known. Call light in reach. Patient mumbling noted while in room; unable to distinguish if she is talking to others or to herself. Transfer orders placed.

## 2025-06-16 NOTE — PROGRESS NOTES
[facility]  DAILY PROGRESS NOTE    NAME: Annmarie Perera  : 1990  MRN: [id number]      LOS: 0 days     PROVIDER OF SERVICE: Jordi Christianson MD    Chief Complaint: Psychosis    Subjective:     Interval History:  History taken from: patient chart RN    No new symptoms   Laying in bed comfortably mood stable no lightheadedness      Review of Systems:   Review of Systems  All negative except as above   Objective:     Vital Signs  Temp:  [97.8 °F (36.6 °C)-98.3 °F (36.8 °C)] 98.2 °F (36.8 °C)  Heart Rate:  [57-92] 61  Resp:  [12-16] 16  BP: ()/(38-65) 100/44   Body mass index is 25.87 kg/m².    Physical Exam  Physical Exam  AO x 3 NAD  RRR S1-S2 normal  Lungs with fair air entry  Abdomen soft nontender nondistended      Diagnostic Data    Results from last 7 days   Lab Units 25  0400 06/15/25  0328 25  0827   WBC 10*3/mm3 6.94   < > 13.48*   HEMOGLOBIN g/dL 12.6   < > 12.9   HEMATOCRIT % 38.3   < > 40.4   PLATELETS 10*3/mm3 281   < > 298   GLUCOSE mg/dL 92   < > 102*   CREATININE mg/dL 0.72   < > 0.83   BUN mg/dL 14.2   < > 24.0*   SODIUM mmol/L 139   < > 140   POTASSIUM mmol/L 4.3   < > 3.9   AST (SGOT) U/L  --   --  17   ALT (SGPT) U/L  --   --  12   ALK PHOS U/L  --   --  70   BILIRUBIN mg/dL  --   --  0.2   ANION GAP mmol/L 8.4   < > 12.1    < > = values in this interval not displayed.       No radiology results for the last day      I reviewed the patient's new clinical results.  I reviewed the patient's new imaging results and agree with the interpretation.    Assessment/Plan:     Active and Resolved Problems  Active Hospital Problems    Diagnosis  POA    **Psychosis [F29]  Yes      Resolved Hospital Problems   No resolved problems to display.       34-year-old female with history of bipolar disorder, schizophrenia admitted to [facility]  with seizure-like activity     #Paranoid schizophrenia  #NAINA  #Cocaine abuse  U-Tox positive for cocaine and  benzodiazepines.  On diazepam at home     Psych following  Currently on olanzapine, diazepam and gabapentin  Counseling on drug abuse     #Hypotension  BP soft patient largely asymptomatic  Lactic acid within normal limits.  No evidence of hypoperfusion  Monitor off IV fluids  No prior history of seizures.  Suspect in setting of cocaine use  Seizure precautions     #Electrolyte abnormalities  Replace electrolytes per protocol    VTE Prophylaxis:  Pharmacologic VTE prophylaxis orders are present.             Disposition Planning:     Barriers to Discharge: Psych workup   Anticipated Date of Discharge: TBD  Place of Discharge: Home      Time: 45 minutes     Code Status and Medical Interventions: CPR (Attempt to Resuscitate); Full Support   Ordered at: 06/14/25 0955     Code Status (Patient has no pulse and is not breathing):    CPR (Attempt to Resuscitate)     Medical Interventions (Patient has pulse or is breathing):    Full Support     Level Of Support Discussed With:    Patient       Signature: Electronically signed by Jordi Christianson MD, 06/16/25, 12:00 EDT.  [facility] Hospitalist Team

## 2025-06-16 NOTE — PROGRESS NOTES
"  Chief complaint depression, hallucinations     Subjective .     History of present illness:  The patient is a 34 y.o. female who was admitted secondary to  seizures. PMHx:bipolar, schizophrenia .  Psychiatric consult was requested by Junior Prajapati MD secondary to schizophrenia, hallucinations and benzodiazepine withdrawal.  The patient was unable to provide any information secondary to decreased level of consciousness.  According to the records, the patient had witnessed grand mal seizure activity.  Family expressed concerns that the patient had not been taking medications, she has no history of schizophrenia, the patient experienced hallucinations, she was feeling paranoid, believes people were coming to get her to.  The patient sees Batool Carter in virtual clinic.  Next appointment 7/1/2025, the patient canceled appointment on 5/21/2025, but she requested to increase Valium.  Past psychiatric history significant for schizophrenia, anxiety, panic disorder, trichotillomania  The patient was on Abilify Maintena, olanzapine and diazepam  Past medications: Alprazolam, Lexapro, lamotrigine, Saphris, Risperdal, Prozac, Zoloft, Vraylar, Seroquel, Invega.    6/16/25: Patient seen today for the first time by this provider. She was alert and oriented to questions. She denied any suicidal ideation, homicidal ideation. She did not display any inappropriate behavior during my assessment.     However, later in the day, staff reported that the patient \"talk to her self\" and appeared to be responding to internal stimuli. Both the  and nursing staff witnessed this.     No family at bedside to obtain collateral.       Review of Systems   All systems were reviewed and negative except for:  Behavioral/Psych: positive for  depression and hallucinations    The following portions of the patient's history were reviewed and updated as appropriate: allergies, current medications, past family history, past medical history, " past social history, past surgical history and problem list.    History    Past psychiatric history : schizophrenia        Medications Prior to Admission   Medication Sig Dispense Refill Last Dose/Taking    ARIPiprazole ER (Abilify Maintena) 400 MG prefilled syringe IM prefilled syringe Inject 400 mg into the appropriate muscle as directed by prescriber Every 28 (Twenty-Eight) Days. 1 each 5 Taking    gabapentin (NEURONTIN) 800 MG tablet Take 1 tablet by mouth 4 (Four) Times a Day. 360 tablet 0 Taking    cloNIDine (CATAPRES) 0.2 MG tablet Take 1 tablet by mouth 2 (Two) Times a Day. for anxiety 60 tablet 1     diazePAM (VALIUM) 10 MG tablet Take 1 tablet by mouth 3 (Three) Times a Day As Needed for Anxiety. for anxiety 90 tablet 0     docusate sodium (Colace) 100 MG capsule Take 1 capsule by mouth 2 (Two) Times a Day. 60 capsule 1     medroxyPROGESTERone (DEPO-PROVERA) 150 MG/ML injection Inject 1 mL into the appropriate muscle as directed by prescriber Every 3 (Three) Months. 1 mL 3     OLANZapine (zyPREXA) 10 MG tablet Take 1 tablet by mouth 2 (Two) Times a Day. 180 tablet 1     rizatriptan (MAXALT) 5 MG tablet Take 1 tablet by mouth 1 (One) Time As Needed for Migraine. May repeat in 2 hours if needed 12 tablet 2         Scheduled Meds:  diazePAM, 5 mg, Oral, TID  enoxaparin sodium, 40 mg, Subcutaneous, Q24H  gabapentin, 800 mg, Oral, Q8H  OLANZapine, 10 mg, Oral, BID  sodium chloride, 10 mL, Intravenous, Q12H         Continuous Infusions:       PRN Meds:    acetaminophen    senna-docusate sodium **AND** polyethylene glycol **AND** bisacodyl **AND** bisacodyl    Calcium Replacement - Follow Nurse / BPA Driven Protocol    diazePAM    Magnesium Standard Dose Replacement - Follow Nurse / BPA Driven Protocol    methocarbamol    ondansetron ODT **OR** ondansetron    Phosphorus Replacement - Follow Nurse / BPA Driven Protocol    Potassium Replacement - Follow Nurse / BPA Driven Protocol    [COMPLETED] Insert Peripheral  "IV **AND** sodium chloride    sodium chloride    sodium chloride    SUMAtriptan    traMADol      Allergies:  Morphine, Haloperidol, Aluminum silicate, and Pedi-pre tape spray [wound dressing adhesive]      Objective     Vital Signs   /42 (BP Location: Left arm, Patient Position: Lying)   Pulse 65   Temp 97.5 °F (36.4 °C) (Oral)   Resp 16   Ht 167.6 cm (66\")   Wt 72.7 kg (160 lb 4.4 oz)   SpO2 95%   BMI 25.87 kg/m²     Physical Exam:  MENTAL STATUS EXAM   General Appearance:  Cleanly groomed and dressed  Eye Contact:  Fair  Attitude:  Guarded  Speech:  Minimal spontaneity  Language:  Unspontaneous  Mood and affect:  Flat  Hopelessness:  Denies  Loneliness: Denies  Thought Process:  Linear  Associations/ Thought Content:  No delusions  Hallucinations:  Other  Other Comment:  Not observed by this provider, but other staff report patient does appear to be responding to internal stimuli at times.  Suicidal Ideations:  Not present  Homicidal Ideation:  Not present  Sensorium:  Alert  Orientation:  Person, place and time  Immediate Recall, Recent, and Remote Memory:  Intact  Attention Span/ Concentration:  Easily distracted and selective attention  Fund of Knowledge:  Limited  Insight:  Limited  Judgement:  Limited  Reliability:  Poor  Impulse Control:  Poor        Medications and allergies reviewed.    Result Review:  I have personally reviewed the results from the time of this admission to 6/16/2025 17:35 EDT and agree with these findings:  [x]  Laboratory  []  Microbiology  []  Radiology  [x]  EKG/Telemetry   []  Cardiology/Vascular   []  Pathology  []  Old records  []  Other:  Most notable findings include:     Assessment & Plan       Psychosis       Assessment: paranoid schizophrenia, generalized anxiety disorder, panic attacks, cocaine abuse.   Treatment Plan: The patient presented with seizure activity initially and appeared to be responding to internal stimuli. The patient is on long acting " abilify Maintena-unable to confirm date of last injection. No emergency contacts on file, outside of patient's mother. Attempted to reach but no answer.     Suspected, from symptoms, that patient may have missed last injection, but no way to confirm.     Continue olanzapine, increase to 15mg twice daily.   Continue diazepam 5mg PO TID to avoid benzo withdrawal and seizures.     Continue supportive treatment.     Our service will follow.   Treatment Plan discussed with: Patient    I discussed the patients findings and my recommendations with patient    I have reviewed and approved the behavioral health treatment plans and problem list. Yes     Referring MD has access to consult report and progress notes in EMR     VENKAT Tolentino  06/16/25  17:35 EDT

## 2025-06-16 NOTE — PLAN OF CARE
Goal Outcome Evaluation:  Plan of Care Reviewed With: patient        Progress: no change  Outcome Evaluation: Blood pressure remains low and patient remains asymptomatic. Continue to monitor.
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
## 2025-06-17 ENCOUNTER — READMISSION MANAGEMENT (OUTPATIENT)
Dept: CALL CENTER | Facility: HOSPITAL | Age: 35
End: 2025-06-17
Payer: MEDICAID

## 2025-06-17 VITALS
WEIGHT: 162.04 LBS | RESPIRATION RATE: 12 BRPM | SYSTOLIC BLOOD PRESSURE: 96 MMHG | TEMPERATURE: 98.1 F | HEIGHT: 66 IN | HEART RATE: 67 BPM | DIASTOLIC BLOOD PRESSURE: 45 MMHG | BODY MASS INDEX: 26.04 KG/M2 | OXYGEN SATURATION: 99 %

## 2025-06-17 LAB
ANION GAP SERPL CALCULATED.3IONS-SCNC: 11 MMOL/L (ref 5–15)
BASOPHILS # BLD AUTO: 0.04 10*3/MM3 (ref 0–0.2)
BASOPHILS NFR BLD AUTO: 0.5 % (ref 0–1.5)
BUN SERPL-MCNC: 25 MG/DL (ref 6–20)
BUN/CREAT SERPL: 34.2 (ref 7–25)
CALCIUM SPEC-SCNC: 8.9 MG/DL (ref 8.6–10.5)
CHLORIDE SERPL-SCNC: 105 MMOL/L (ref 98–107)
CO2 SERPL-SCNC: 23 MMOL/L (ref 22–29)
CREAT SERPL-MCNC: 0.73 MG/DL (ref 0.57–1)
DEPRECATED RDW RBC AUTO: 44 FL (ref 37–54)
EGFRCR SERPLBLD CKD-EPI 2021: 109.5 ML/MIN/1.73
EOSINOPHIL # BLD AUTO: 0.15 10*3/MM3 (ref 0–0.4)
EOSINOPHIL NFR BLD AUTO: 1.9 % (ref 0.3–6.2)
ERYTHROCYTE [DISTWIDTH] IN BLOOD BY AUTOMATED COUNT: 12.3 % (ref 12.3–15.4)
GLUCOSE SERPL-MCNC: 128 MG/DL (ref 65–99)
HCT VFR BLD AUTO: 38.6 % (ref 34–46.6)
HGB BLD-MCNC: 12.4 G/DL (ref 12–15.9)
IMM GRANULOCYTES # BLD AUTO: 0.04 10*3/MM3 (ref 0–0.05)
IMM GRANULOCYTES NFR BLD AUTO: 0.5 % (ref 0–0.5)
LYMPHOCYTES # BLD AUTO: 2.13 10*3/MM3 (ref 0.7–3.1)
LYMPHOCYTES NFR BLD AUTO: 27.6 % (ref 19.6–45.3)
MCH RBC QN AUTO: 31.2 PG (ref 26.6–33)
MCHC RBC AUTO-ENTMCNC: 32.1 G/DL (ref 31.5–35.7)
MCV RBC AUTO: 97.2 FL (ref 79–97)
MONOCYTES # BLD AUTO: 0.65 10*3/MM3 (ref 0.1–0.9)
MONOCYTES NFR BLD AUTO: 8.4 % (ref 5–12)
NEUTROPHILS NFR BLD AUTO: 4.7 10*3/MM3 (ref 1.7–7)
NEUTROPHILS NFR BLD AUTO: 61.1 % (ref 42.7–76)
NRBC BLD AUTO-RTO: 0 /100 WBC (ref 0–0.2)
PLATELET # BLD AUTO: 304 10*3/MM3 (ref 140–450)
PMV BLD AUTO: 10 FL (ref 6–12)
POTASSIUM SERPL-SCNC: 4.1 MMOL/L (ref 3.5–5.2)
RBC # BLD AUTO: 3.97 10*6/MM3 (ref 3.77–5.28)
SODIUM SERPL-SCNC: 139 MMOL/L (ref 136–145)
WBC NRBC COR # BLD AUTO: 7.71 10*3/MM3 (ref 3.4–10.8)

## 2025-06-17 PROCEDURE — 85025 COMPLETE CBC W/AUTO DIFF WBC: CPT | Performed by: INTERNAL MEDICINE

## 2025-06-17 PROCEDURE — 80048 BASIC METABOLIC PNL TOTAL CA: CPT | Performed by: INTERNAL MEDICINE

## 2025-06-17 PROCEDURE — 25010000002 ARIPIPRAZOLE ER 400 MG PREFILLED SYRINGE

## 2025-06-17 RX ORDER — OLANZAPINE 15 MG/1
15 TABLET, FILM COATED ORAL 2 TIMES DAILY
Qty: 28 TABLET | Refills: 0 | Status: SHIPPED | OUTPATIENT
Start: 2025-06-17 | End: 2025-07-01

## 2025-06-17 RX ORDER — OLANZAPINE 15 MG/1
15 TABLET, FILM COATED ORAL 2 TIMES DAILY
Qty: 28 TABLET | Refills: 0 | Status: SHIPPED | OUTPATIENT
Start: 2025-06-17 | End: 2025-06-17

## 2025-06-17 RX ORDER — DIAZEPAM 5 MG/1
5 TABLET ORAL EVERY 8 HOURS PRN
Qty: 9 TABLET | Refills: 0 | Status: SHIPPED | OUTPATIENT
Start: 2025-06-17 | End: 2025-06-20

## 2025-06-17 RX ORDER — DIAZEPAM 5 MG/1
5 TABLET ORAL EVERY 8 HOURS PRN
Qty: 9 TABLET | Refills: 0 | Status: SHIPPED | OUTPATIENT
Start: 2025-06-17 | End: 2025-06-17

## 2025-06-17 RX ADMIN — Medication 10 ML: at 09:53

## 2025-06-17 RX ADMIN — OLANZAPINE 15 MG: 5 TABLET, FILM COATED ORAL at 09:52

## 2025-06-17 RX ADMIN — GABAPENTIN 800 MG: 400 CAPSULE ORAL at 14:56

## 2025-06-17 RX ADMIN — ARIPIPRAZOLE 400 MG: KIT at 14:56

## 2025-06-17 RX ADMIN — DIAZEPAM 5 MG: 5 TABLET ORAL at 09:52

## 2025-06-17 RX ADMIN — GABAPENTIN 800 MG: 400 CAPSULE ORAL at 05:40

## 2025-06-17 NOTE — CASE MANAGEMENT/SOCIAL WORK
Continued Stay Note  SHELLY Edwards     Patient Name: Annmarie Perera  MRN: [id number]  Today's Date: 6/17/2025    Admit Date: 6/14/2025    Plan: Pending psych recommendation. From home with mother.   Discharge Plan       Row Name 06/17/25 1105       Plan    Plan Comments Call received from Johanna the Discharge  through [facility]. Reported that she can be of assistance if needed at ([phone]). Information added to print on AVS at d/c for patient use.                Elaine Hunter RN     Office Phone: [phone]  Office Cell: [phone]

## 2025-06-17 NOTE — DISCHARGE INSTR - OTHER ORDERS
Johanna, the Discharge  through [facility] can be of assistance if needed at ([phone]).
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
## 2025-06-17 NOTE — PAYOR COMM NOTE
"This is clinical for Nu Perera.     AUTHORIZATION PENDING: [id number].   PLEASE FAX OR CALL DETERMINATION TO CONTACT BELOW:   THANK YOU.      Leena Montes RN, CCM  Utilization Nurse  [address] [address]   [phone]   [phone]     Nu Perera (34 y.o. Female)       Date of Birth   1990    Social Security Number       Address   [address] [place], [place] [id number]    Home Phone       MRN   [id number]       Gnosticism   None    Marital Status   Single                            Admission Date   6/14/2025    Admission Type   Emergency    Admitting Provider   Jordi Christianson MD    Attending Provider   Jordi Christianson MD    Department, Room/Bed   [facility] PROGRESS CARE, 2118/1       Discharge Date       Discharge Disposition   Home or Self Care    Discharge Destination                                 Attending Provider: Jordi Christianson MD    Allergies: Morphine, Haloperidol, Aluminum Silicate, Pedi-pre Tape Spray [Wound Dressing Adhesive]    Isolation: None   Infection: None   Code Status: CPR    Ht: 167.6 cm (66\")   Wt: 73.5 kg (162 lb 0.6 oz)    Admission Cmt: None   Principal Problem: Psychosis [F29]                   Active Insurance as of 6/14/2025       Primary Coverage       Payor Plan Insurance Group Employer/Plan Group    [facility] COMMUNITY PLAN [facility] COMMUNITY PLAN [facility]       Payor Plan Address Payor Plan Phone Number Payor Plan Fax Number Effective Dates    [address]   1/1/2023 - None Entered    [facility] [address]         Subscriber Name Subscriber Birth Date Member ID       NU PERERA 1990 [id number]                     Emergency Contacts        (Rel.) Home Phone Work Phone Mobile Phone    MEHDI PERERA (Mother) -- -- [phone]                 History & Physical        Beau Aguilar MD at 06/14/25 0935              [facility]  History & " Physical    Patient Name: Annmarie Perera  : 1990  MRN: [id number]  Primary Care Physician:  Meg Huff APRN  Date of admission: 2025  Date and Time of Service: 2025 at 10:00 EDT    Subjective      Chief Complaint:   Chief Complaint   Patient presents with    Seizures     EMS reports that pt's family witnessed grand mal seizure activity, pt alert and oriented to self only, pt denies head or neck pain. Per EMS pt has not taken her zyprexa today, pt has had episodes like this before per family report to EMS.        History of Present Illness: Annmarie Perera is a 34 y.o. female with a CMH of bipolar, schizophrenia who presented to [facility] on 2025 with Seizures (EMS reports that pt's family witnessed grand mal seizure activity, pt alert and oriented to self only, pt denies head or neck pain. Per EMS pt has not taken her zyprexa today, pt has had episodes like this before per family report to EMS.)     - Was unable to obtain significant history from the patient as she would not want to discuss and wanted to sleep.  She was alert to self, did not know her age or what year it was.  Patient put blanket over her head and refused any further questioning.  - Family was concerned that patient had not been taking medications in the outpatient setting as she had a known history of schizophrenia.  They also were concerned that patient may have had some shaking type jerking movements of her body concerning for a seizure.  Brought the patient into the hospital for further evaluation via EMS.  - Per nursing staff patient has been complaining of hallucination with people coming to get her.  She denies any self-harm or homicidal ideation.  Patient has been complaining of both audible/visual hallucinations.  No further seizure like activity noted at bedside.  - No patient contact information listed in the chart and no family in the room. History mainly obtained from EMR, ER provider, and  nursing.    Vital signs stable.  Labs significant for cocaine use, mild leukocytosis, benzodiazepine use.  CT head negative for acute intracranial processes noted intracranial hemorrhage.    Review of Systems   Constitutional:  Positive for fatigue.   Neurological:  Positive for seizures.   Psychiatric/Behavioral:  Positive for behavioral problems, confusion and decreased concentration.    Limited due to lack of patient cooperation    Personal History     Past Medical History:   Diagnosis Date    Abdominal pain, epigastric 06/20/2017    Borderline personality disorder     Chronic pain syndrome 09/11/2017    Contraceptive management 01/23/2018    Dysuria 11/30/2017    Genital herpes 11/20/2017    High risk sexual behavior 11/20/2017    HPV (human papilloma virus) anogenital infection     Irregular menstruation 11/30/2017    refer to OB/GYN for further management 11-    Migraine     Panic disorder     PONV (postoperative nausea and vomiting)     Poor compliance with medication 02/21/2019    Positive skin test for tuberculosis     Tuberculosis positive. STATES CXR NORMAL NEVER TREATED WITH MEDS    Pyelonephritis, acute 06/20/2017    Improved 06-    Right knee pain 01/23/2018    Discussed symptomatic treatment, activity as tolerate. 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    Vaginitis 11/20/2017    Withdrawal symptoms, drug or narcotic        Past Surgical History:   Procedure Laterality Date    CERVICAL BIOPSY  W/ LOOP ELECTRODE EXCISION      PAP SMEAR  2017    [facility]straction from [facility]    SALPINGECTOMY Bilateral 6/4/2024    Procedure: SALPINGECTOMY LAPAROSCOPIC;  Surgeon: Mima Larsen MD;  Location: [facility];  Service: Obstetrics/Gynecology;  Laterality: Bilateral;       Family History: family history includes Breast  cancer in her maternal aunt and maternal grandmother; Other in her mother. Otherwise pertinent FHx was reviewed and not pertinent to current issue.    Social History:  reports that she has been smoking cigarettes. She has a 30 pack-year smoking history. She has never used smokeless tobacco. She reports that she does not currently use drugs after having used the following drugs: Cocaine(coke), Benzodiazepines, Oxycodone, Hydrocodone, and Marijuana. She reports that she does not drink alcohol.    Home Medications:  Prior to Admission Medications       Prescriptions Last Dose Informant Patient Reported? Taking?    ARIPiprazole ER (Abilify Maintena) 400 MG prefilled syringe IM prefilled syringe   No Yes    Inject 400 mg into the appropriate muscle as directed by prescriber Every 28 (Twenty-Eight) Days.    gabapentin (NEURONTIN) 800 MG tablet   No Yes    Take 1 tablet by mouth 4 (Four) Times a Day.    cloNIDine (CATAPRES) 0.2 MG tablet   No No    Take 1 tablet by mouth 2 (Two) Times a Day. for anxiety    diazePAM (VALIUM) 10 MG tablet   No No    Take 1 tablet by mouth 3 (Three) Times a Day As Needed for Anxiety. for anxiety    docusate sodium (Colace) 100 MG capsule   No No    Take 1 capsule by mouth 2 (Two) Times a Day.    medroxyPROGESTERone (DEPO-PROVERA) 150 MG/ML injection   No No    Inject 1 mL into the appropriate muscle as directed by prescriber Every 3 (Three) Months.    OLANZapine (zyPREXA) 10 MG tablet   No No    Take 1 tablet by mouth 2 (Two) Times a Day.    rizatriptan (MAXALT) 5 MG tablet   No No    Take 1 tablet by mouth 1 (One) Time As Needed for Migraine. May repeat in 2 hours if needed              Allergies:  Allergies   Allergen Reactions    Morphine Irritability    Haloperidol Mental Status Change and Other (See Comments)     Abstraction from [employer]    Aluminum Silicate Unknown - Low Severity    Pedi-Pre Tape Spray [Wound Dressing Adhesive] Rash       Objective      Vitals:   Temp:  [98.6 °F (37  °C)] 98.6 °F (37 °C)  Heart Rate:  [98] 98  Resp:  [17] 17  BP: (110)/(61) 110/61  There is no height or weight on file to calculate BMI.  Physical Exam  Vitals and nursing note reviewed.   Constitutional:       General: She is not in acute distress.     Appearance: Normal appearance. She is normal weight. She is ill-appearing. She is not toxic-appearing.   HENT:      Head: Normocephalic and atraumatic.      Nose: Nose normal.      Mouth/Throat:      Mouth: Mucous membranes are moist.      Pharynx: Oropharynx is clear.   Eyes:      General: No scleral icterus.     Extraocular Movements: Extraocular movements intact.      Conjunctiva/sclera: Conjunctivae normal.   Cardiovascular:      Rate and Rhythm: Normal rate and regular rhythm.      Pulses: Normal pulses.      Heart sounds: Normal heart sounds. No murmur heard.     No friction rub. No gallop.   Pulmonary:      Effort: Pulmonary effort is normal. No respiratory distress.      Breath sounds: Normal breath sounds. No wheezing, rhonchi or rales.   Abdominal:      General: Abdomen is flat. Bowel sounds are normal. There is no distension.      Palpations: Abdomen is soft.      Tenderness: There is no abdominal tenderness. There is no guarding.   Musculoskeletal:         General: Normal range of motion.      Cervical back: Normal range of motion. No rigidity or tenderness.      Right lower leg: No edema.      Left lower leg: No edema.   Skin:     General: Skin is warm.      Coloration: Skin is not jaundiced or pale.   Neurological:      General: No focal deficit present.      Mental Status: She is alert. She is disoriented.   Psychiatric:         Attention and Perception: She is inattentive. She perceives auditory and visual hallucinations.         Behavior: Behavior is uncooperative and withdrawn.         Thought Content: Thought content is paranoid.         Cognition and Memory: Memory is impaired. She exhibits impaired recent memory and impaired remote memory.          Judgment: Judgment is impulsive and inappropriate.         Diagnostic Data:  Lab Results (last 24 hours)       Procedure Component Value Units Date/Time    Urine Drug Screen - Straight Cath [[id number]]  (Abnormal) Collected: 06/14/25 0855    Specimen: Urine from Straight Cath Updated: 06/14/25 0929     THC, Screen, Urine Negative     Phencyclidine (PCP), Urine Negative     Cocaine Screen, Urine Positive     Methamphetamine, Ur Negative     Opiate Screen Negative     Amphetamine Screen, Urine Negative     Benzodiazepine Screen, Urine Positive     Tricyclic Antidepressants Screen Negative     Methadone Screen, Urine Negative     Barbiturates Screen, Urine Negative     Oxycodone Screen, Urine Negative     Buprenorphine, Screen, Urine Negative    Narrative:      Cutoff For Drugs Screened:    Amphetamines               500 ng/ml  Barbiturates               200 ng/ml  Benzodiazepines            150 ng/ml  Cocaine                    150 ng/ml  Methadone                  200 ng/ml  Opiates                    100 ng/ml  Phencyclidine               25 ng/ml  THC                         50 ng/ml  Methamphetamine            500 ng/ml  Tricyclic Antidepressants  300 ng/ml  Oxycodone                  100 ng/ml  Buprenorphine               10 ng/ml    The normal value for all drugs tested is negative. This report includes unconfirmed screening results, with the cutoff values listed, to be used for medical treatment purposes only.  Unconfirmed results must not be used for non-medical purposes such as employment or legal testing.  Clinical consideration should be applied to any drug of abuse test, particularly when unconfirmed results are used.    All urine drugs of abuse requests without chain of custody are for medical screening purposes only.  False positives are possible.      Comprehensive Metabolic Panel [[id number]]  (Abnormal) Collected: 06/14/25 0827    Specimen: Blood Updated: 06/14/25 0855     Glucose 102 mg/dL      BUN  24.0 mg/dL      Creatinine 0.83 mg/dL      Sodium 140 mmol/L      Potassium 3.9 mmol/L      Chloride 104 mmol/L      CO2 23.9 mmol/L      Calcium 9.0 mg/dL      Total Protein 7.2 g/dL      Albumin 4.7 g/dL      ALT (SGPT) 12 U/L      AST (SGOT) 17 U/L      Alkaline Phosphatase 70 U/L      Total Bilirubin 0.2 mg/dL      Globulin 2.5 gm/dL      A/G Ratio 1.9 g/dL      BUN/Creatinine Ratio 28.9     Anion Gap 12.1 mmol/L      eGFR 93.8 mL/min/1.73     Narrative:      GFR Categories in Chronic Kidney Disease (CKD)              GFR Category          GFR (mL/min/1.73)    Interpretation  G1                    90 or greater        Normal or high (1)  G2                    60-89                Mild decrease (1)  G3a                   45-59                Mild to moderate decrease  G3b                   30-44                Moderate to severe decrease  G4                    15-29                Severe decrease  G5                    14 or less           Kidney failure    (1)In the absence of evidence of kidney disease, neither GFR category G1 or G2 fulfill the criteria for CKD.    eGFR calculation 2021 CKD-EPI creatinine equation, which does not include race as a factor    CK [[id number]]  (Abnormal) Collected: 06/14/25 0827    Specimen: Blood Updated: 06/14/25 0855     Creatine Kinase 187 U/L     Extra Tubes [[id number]] Collected: 06/14/25 0827    Specimen: Blood, Venous Line Updated: 06/14/25 0846    Narrative:      The following orders were created for panel order Extra Tubes.  Procedure                               Abnormality         Status                     ---------                               -----------         ------                     Gold Top - SST[[id number]]                                   Final result                 Please view results for these tests on the individual orders.    Gold Top - SST [[id number]] Collected: 06/14/25 0827    Specimen: Blood Updated: 06/14/25 0846     Extra Tube Hold for add-ons.      Comment: Auto resulted.       CBC & Differential [[id number]]  (Abnormal) Collected: 06/14/25 0827    Specimen: Blood Updated: 06/14/25 0837    Narrative:      The following orders were created for panel order CBC & Differential.  Procedure                               Abnormality         Status                     ---------                               -----------         ------                     CBC Auto Differential[[id number]]        Abnormal            Final result                 Please view results for these tests on the individual orders.    CBC Auto Differential [[id number]]  (Abnormal) Collected: 06/14/25 0827    Specimen: Blood Updated: 06/14/25 0837     WBC 13.48 10*3/mm3      RBC 4.09 10*6/mm3      Hemoglobin 12.9 g/dL      Hematocrit 40.4 %      MCV 98.8 fL      MCH 31.5 pg      MCHC 31.9 g/dL      RDW 12.4 %      RDW-SD 44.8 fl      MPV 9.6 fL      Platelets 298 10*3/mm3      Neutrophil % 88.2 %      Lymphocyte % 6.5 %      Monocyte % 4.4 %      Eosinophil % 0.2 %      Basophil % 0.2 %      Immature Grans % 0.5 %      Neutrophils, Absolute 11.88 10*3/mm3      Lymphocytes, Absolute 0.88 10*3/mm3      Monocytes, Absolute 0.59 10*3/mm3      Eosinophils, Absolute 0.03 10*3/mm3      Basophils, Absolute 0.03 10*3/mm3      Immature Grans, Absolute 0.07 10*3/mm3      nRBC 0.0 /100 WBC              Imaging Results (Last 24 Hours)       Procedure Component Value Units Date/Time    CT Head Without Contrast [[id number]] Collected: 06/14/25 0854     Updated: 06/14/25 0859    Narrative:      CT HEAD WO CONTRAST    Date of Exam: 6/14/2025 8:40 AM EDT    Indication: poss sz.    Comparison: Head CT 1/31/2024.    Technique: Axial CT images were obtained of the head without contrast administration.  Coronal reconstructions were performed.  Automated exposure control and iterative reconstruction methods were used.    Findings:  Motion-degraded exam. No evidence of acute intracranial hemorrhage or mass effect. No  extra-axial collection. The gray white matter differentiation is preserved. Ventricles and sulci are symmetric.    The mastoid air cells and paranasal sinuses are well aerated. Globes and extraocular muscles are unremarkable. No acute or suspicious osseous abnormality. Soft tissues within normal limits.      Impression:      Impression:   Suboptimal exam due to motion degradation. Within these confines, no gross evidence of acute intracranial process.      Electronically Signed: Justen Abrams MD    6/14/2025 8:57 AM EDT    Workstation ID: [id number]              Assessment & Plan        This is a 34 y.o. female with:    Active and Resolved Problems  Active Hospital Problems    Diagnosis  POA    **Psychosis [F29]  Yes      Resolved Hospital Problems   No resolved problems to display.       Concern for acute psychosis in the setting of known schizophrenia and bipolar  - Concerned about medication noncompliance in the outpatient setting  - Psychiatry consulted for evaluation management  - Restart the patient's home Zyprexa.  - Home likely injectable Abilify not on formulary, will defer to psychiatry in regards to treatment plan    Altered mental status likely secondary to underlying psychological condition along with cocaine use and benzodiazepine use  - IV fluid  - Continue treatment as above for psychosis  - Encouraged discontinuation of cocaine.  If patient were to continue to require benzodiazepine will need to closely monitor for any sedating behavior pattern and adjust medication as tolerated.    Leukocytosis  - Can be reactive in nature.  Holding of antibiotics in the setting of no obvious sources of infection.  Continue to monitor, low threshold to start antibiotics.  - Pending UA    Hypophosphatemia-phosphorus replacement    VTE Prophylaxis:  Pharmacologic VTE prophylaxis orders are present.        The patient desires to be as follows:    CODE STATUS:    Code Status (Patient has no pulse and is not breathing):  CPR (Attempt to Resuscitate)  Medical Interventions (Patient has pulse or is breathing): Full Support  Level Of Support Discussed With: Patient      No patient contact listed in the chart. Pt unable to provide information at this time.     Admission Status:  I believe this patient meets Observation status.    Expected Length of Stay: 1-2 days     PDMP and Medication Dispenses via Sidebar reviewed and consistent with patient reported medications.    I discussed the patient's findings and my recommendations with patient and nursing staff.      Signature:     This document has been electronically signed by Beau Aguilar MD on June 14, 2025 10:00 EDT   [facility] [place] Hospitalist Team      Electronically signed by Beau Aguilar MD at 06/14/25 1001          Emergency Department Notes        Junior Prajapati MD at 06/14/25 0889          Subjective   History of Present Illness  34-year-old female presents after EMS was called for seizure.  Initial history limited.  Patient reports she did take Valium this morning although when family arrived they believe she ran out of medicine 5 days ago.  They report that she was jerking.  She has been hearing voices talking to herself.  She had hit her head on the floor.  She has history of schizophrenia and reports that she has been coming more unstable recently.  Review of Systems    Past Medical History:   Diagnosis Date    Abdominal pain, epigastric 06/20/2017    Borderline personality disorder     Chronic pain syndrome 09/11/2017    Contraceptive management 01/23/2018    Dysuria 11/30/2017    Genital herpes 11/20/2017    High risk sexual behavior 11/20/2017    HPV (human papilloma virus) anogenital infection     Irregular menstruation 11/30/2017    refer to OB/GYN for further management 11-    Migraine     Panic disorder     PONV (postoperative nausea and vomiting)     Poor compliance with medication 02/21/2019    Positive skin test for tuberculosis     Tuberculosis positive.  STATES CXR NORMAL NEVER TREATED WITH MEDS    Pyelonephritis, acute 06/20/2017    Improved 06-    Right knee pain 01/23/2018    Discussed symptomatic treatment, activity as tolerate. 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    Vaginitis 11/20/2017    Withdrawal symptoms, drug or narcotic        Allergies   Allergen Reactions    Morphine Irritability    Haloperidol Mental Status Change and Other (See Comments)     Abstraction from [email or web address]    Aluminum Silicate Unknown - Low Severity    Pedi-Pre Tape Spray [Wound Dressing Adhesive] Rash       Past Surgical History:   Procedure Laterality Date    CERVICAL BIOPSY  W/ LOOP ELECTRODE EXCISION      PAP SMEAR  2017    [facility]raction from [email or web address] [facility]    SALPINGECTOMY Bilateral 6/4/2024    Procedure: SALPINGECTOMY LAPAROSCOPIC;  Surgeon: Mima Larsen MD;  Location: [facility];  Service: Obstetrics/Gynecology;  Laterality: Bilateral;       Family History   Problem Relation Age of Onset    Other Mother         Tumor    Breast cancer Maternal Aunt     Breast cancer Maternal Grandmother     Ovarian cancer Neg Hx     Uterine cancer Neg Hx     Colon cancer Neg Hx     Malig Hyperthermia Neg Hx        Social History     Socioeconomic History    Marital status: Single   Tobacco Use    Smoking status: Every Day     Current packs/day: 2.00     Average packs/day: 2.0 packs/day for 15.0 years (30.0 ttl pk-yrs)     Types: Cigarettes    Smokeless tobacco: Never    Tobacco comments:     Passive Smoke: Y   Vaping Use    Vaping status: Former    Substances: Flavoring    Devices: Disposable, Pre-filled pod   Substance and Sexual Activity    Alcohol use: No    Drug use: Not Currently     Types: Cocaine(coke), Benzodiazepines, Oxycodone, Hydrocodone, Marijuana     Comment: 9 years sober    Sexual activity: Defer      Partners: Male     Birth control/protection: Depo-provera     Prior to Admission medications    Medication Sig Start Date End Date Taking? Authorizing Provider   ARIPiprazole ER (Abilify Maintena) 400 MG prefilled syringe IM prefilled syringe Inject 400 mg into the appropriate muscle as directed by prescriber Every 28 (Twenty-Eight) Days. 5/19/25   Batool Carter PA-C   clindamycin (CLEOCIN) 300 MG capsule Take 1 capsule by mouth 3 (Three) Times a Day. 9/1/24   Martha Pham MD   cloNIDine (CATAPRES) 0.2 MG tablet Take 1 tablet by mouth 2 (Two) Times a Day. for anxiety 4/21/25   Batool Carter PA-C   diazePAM (VALIUM) 10 MG tablet Take 1 tablet by mouth 3 (Three) Times a Day As Needed for Anxiety. for anxiety 5/19/25   Batool Carter PA-C   docusate sodium (Colace) 100 MG capsule Take 1 capsule by mouth 2 (Two) Times a Day. 6/4/24   Mima Larsen MD   gabapentin (NEURONTIN) 800 MG tablet Take 1 tablet by mouth 4 (Four) Times a Day. 5/19/25   Batool Carter PA-C   ketoconazole (NIZORAL) 2 % cream Apply 1 Application topically to the appropriate area as directed Daily. 9/1/24   Martha Pham MD   medroxyPROGESTERone (DEPO-PROVERA) 150 MG/ML injection Inject 1 mL into the appropriate muscle as directed by prescriber Every 3 (Three) Months. 7/12/24   Meg Huff APRN   OLANZapine (zyPREXA) 10 MG tablet Take 1 tablet by mouth 2 (Two) Times a Day. 2/20/25   Batool Carter PA-C   rizatriptan (MAXALT) 5 MG tablet Take 1 tablet by mouth 1 (One) Time As Needed for Migraine. May repeat in 2 hours if needed 4/29/24   Valeria Addison MD   terbinafine (lamiSIL) 250 MG tablet Take 1 tablet by mouth Daily. 9/12/24   Meg Huff APRN             Objective   Physical Exam  34-year-old female awake.  Chronically ill in appearance.  Oropharynx there is minor contusion to tip of tongue noted.  Chest clear cardiovascular regular rhythm abdomen soft nontender neurologic exam she is awake but  sleepy.  She will answer only minimal questions.  She does move all extremities.  She is not hyperreflexic.  She has no rigidity.  She does have spasmodic jerking of extremities.  This is not seizure.  Procedures          ED Course      Results for orders placed or performed during the hospital encounter of 06/14/25   Comprehensive Metabolic Panel    Collection Time: 06/14/25  8:27 AM    Specimen: Blood   Result Value Ref Range    Glucose 102 (H) 65 - 99 mg/dL    BUN 24.0 (H) 6.0 - 20.0 mg/dL    Creatinine 0.83 0.57 - 1.00 mg/dL    Sodium 140 136 - 145 mmol/L    Potassium 3.9 3.5 - 5.2 mmol/L    Chloride 104 98 - 107 mmol/L    CO2 23.9 22.0 - 29.0 mmol/L    Calcium 9.0 8.6 - 10.5 mg/dL    Total Protein 7.2 6.0 - 8.5 g/dL    Albumin 4.7 3.5 - 5.2 g/dL    ALT (SGPT) 12 1 - 33 U/L    AST (SGOT) 17 1 - 32 U/L    Alkaline Phosphatase 70 39 - 117 U/L    Total Bilirubin 0.2 0.0 - 1.2 mg/dL    Globulin 2.5 gm/dL    A/G Ratio 1.9 g/dL    BUN/Creatinine Ratio 28.9 (H) 7.0 - 25.0    Anion Gap 12.1 5.0 - 15.0 mmol/L    eGFR 93.8 >60.0 mL/min/1.73   CK    Collection Time: 06/14/25  8:27 AM    Specimen: Blood   Result Value Ref Range    Creatine Kinase 187 (H) 20 - 180 U/L   CBC Auto Differential    Collection Time: 06/14/25  8:27 AM    Specimen: Blood   Result Value Ref Range    WBC 13.48 (H) 3.40 - 10.80 10*3/mm3    RBC 4.09 3.77 - 5.28 10*6/mm3    Hemoglobin 12.9 12.0 - 15.9 g/dL    Hematocrit 40.4 34.0 - 46.6 %    MCV 98.8 (H) 79.0 - 97.0 fL    MCH 31.5 26.6 - 33.0 pg    MCHC 31.9 31.5 - 35.7 g/dL    RDW 12.4 12.3 - 15.4 %    RDW-SD 44.8 37.0 - 54.0 fl    MPV 9.6 6.0 - 12.0 fL    Platelets 298 140 - 450 10*3/mm3    Neutrophil % 88.2 (H) 42.7 - 76.0 %    Lymphocyte % 6.5 (L) 19.6 - 45.3 %    Monocyte % 4.4 (L) 5.0 - 12.0 %    Eosinophil % 0.2 (L) 0.3 - 6.2 %    Basophil % 0.2 0.0 - 1.5 %    Immature Grans % 0.5 0.0 - 0.5 %    Neutrophils, Absolute 11.88 (H) 1.70 - 7.00 10*3/mm3    Lymphocytes, Absolute 0.88 0.70 - 3.10  10*3/mm3    Monocytes, Absolute 0.59 0.10 - 0.90 10*3/mm3    Eosinophils, Absolute 0.03 0.00 - 0.40 10*3/mm3    Basophils, Absolute 0.03 0.00 - 0.20 10*3/mm3    Immature Grans, Absolute 0.07 (H) 0.00 - 0.05 10*3/mm3    nRBC 0.0 0.0 - 0.2 /100 WBC   Gold Top - SST    Collection Time: 06/14/25  8:27 AM   Result Value Ref Range    Extra Tube Hold for add-ons.    Urine Drug Screen - Straight Cath    Collection Time: 06/14/25  8:55 AM    Specimen: Straight Cath; Urine   Result Value Ref Range    THC, Screen, Urine Negative Negative    Phencyclidine (PCP), Urine Negative Negative    Cocaine Screen, Urine Positive (A) Negative    Methamphetamine, Ur Negative Negative    Opiate Screen Negative Negative    Amphetamine Screen, Urine Negative Negative    Benzodiazepine Screen, Urine Positive (A) Negative    Tricyclic Antidepressants Screen Negative Negative    Methadone Screen, Urine Negative Negative    Barbiturates Screen, Urine Negative Negative    Oxycodone Screen, Urine Negative Negative    Buprenorphine, Screen, Urine Negative Negative     CT Head Without Contrast   Final Result   Impression:    Suboptimal exam due to motion degradation. Within these confines, no gross evidence of acute intracranial process.         Electronically Signed: Justen Abrams MD     6/14/2025 8:57 AM EDT     Workstation ID: [id number]        Medications   sodium chloride 0.9 % flush 10 mL (has no administration in time range)   OLANZapine zydis (zyPREXA) disintegrating tablet 10 mg (has no administration in time range)   diazePAM (VALIUM) injection 5 mg (5 mg Intravenous Given 6/14/25 0848)     /61   Pulse 98   Temp 98.6 °F (37 °C) (Oral)   Resp 17   SpO2 96%                                                    Medical Decision Making  Amount and/or Complexity of Data Reviewed  Labs: ordered.  Radiology: ordered.    Risk  Prescription drug management.    Chart review: Patient was noted to have called psychiatrist on the 27th of last month  asking for increase in her medication reports living with her mother and this extremely stressful.  Noted that she is already on Valium 10 mg 3 times a day.  Comorbidity: As per past history   Differential: Benzodiazepine withdrawal, schizophrenia, closed head injury,  My EKG interpretation: Not indicated  Lab: White count 13.4 with hemoglobin 12.9 platelet count 298 88 segs no bands CK borderline elevated 187 comprehensive metabolic panel remarkable for glucose of 102 and BUN of 24.  Urine DOA positive for cocaine and benzodiazepine  My Radiology review and interpretation: CT scan head has some motion artifact however no evidence of acute intracranial abnormality noted.  Gray-white differentiation appears preserved.  Discussion/treatment: Patient IV placed.  Was given Valium 5 mg IV.  While give Zyprexa 10 mg ODT.  Findings were discussed with patient's mother.  Patient was discussed with the hospitalist.  Will be admitted to their service with psychiatric consultation.  At this time it is not clear if she had seizure at home but it is possible that she did have benzodiazepine withdrawal seizure.  Patient was evaluated using appropriate PPE      Final diagnoses:   Schizophrenia, unspecified type   Benzodiazepine withdrawal with complication       ED Disposition  ED Disposition       ED Disposition   Decision to Admit    Condition   --    Comment   --               No follow-up provider specified.       Medication List      No changes were made to your prescriptions during this visit.            Junior Prajapati MD  06/14/25 0939      Electronically signed by Junior Prajapati MD at 06/14/25 0998       Operative/Procedure Notes (last 4 days)  Notes from 06/13/25 1259 through 06/17/25 1259   No notes of this type exist for this encounter.          Physician Progress Notes (last 4 days)        Jordi Christianson MD at 06/16/25 1200              [facility] MEDICINE SERVICE  DAILY PROGRESS NOTE    NAME: Annmarie PIZARRO  Trever  : 1990  MRN: [id number]      LOS: 0 days     PROVIDER OF SERVICE: Jordi Christianson MD    Chief Complaint: Psychosis    Subjective:     Interval History:  History taken from: patient chart RN    No new symptoms   Laying in bed comfortably mood stable no lightheadedness      Review of Systems:   Review of Systems  All negative except as above   Objective:     Vital Signs  Temp:  [97.8 °F (36.6 °C)-98.3 °F (36.8 °C)] 98.2 °F (36.8 °C)  Heart Rate:  [57-92] 61  Resp:  [12-16] 16  BP: ()/(38-65) 100/44   Body mass index is 25.87 kg/m².    Physical Exam  Physical Exam  AO x 3 NAD  RRR S1-S2 normal  Lungs with fair air entry  Abdomen soft nontender nondistended      Diagnostic Data    Results from last 7 days   Lab Units 25  0400 06/15/25  0328 25  0827   WBC 10*3/mm3 6.94   < > 13.48*   HEMOGLOBIN g/dL 12.6   < > 12.9   HEMATOCRIT % 38.3   < > 40.4   PLATELETS 10*3/mm3 281   < > 298   GLUCOSE mg/dL 92   < > 102*   CREATININE mg/dL 0.72   < > 0.83   BUN mg/dL 14.2   < > 24.0*   SODIUM mmol/L 139   < > 140   POTASSIUM mmol/L 4.3   < > 3.9   AST (SGOT) U/L  --   --  17   ALT (SGPT) U/L  --   --  12   ALK PHOS U/L  --   --  70   BILIRUBIN mg/dL  --   --  0.2   ANION GAP mmol/L 8.4   < > 12.1    < > = values in this interval not displayed.       No radiology results for the last day      I reviewed the patient's new clinical results.  I reviewed the patient's new imaging results and agree with the interpretation.    Assessment/Plan:     Active and Resolved Problems  Active Hospital Problems    Diagnosis  POA    **Psychosis [F29]  Yes      Resolved Hospital Problems   No resolved problems to display.       34-year-old female with history of bipolar disorder, schizophrenia admitted to [facility]  with seizure-like activity     #Paranoid schizophrenia  #[place]  #Cocaine abuse  U-Tox positive for cocaine and benzodiazepines.  On diazepam at home     Psych following  Currently on olanzapine,  diazepam and gabapentin  Counseling on drug abuse     #Hypotension  BP soft patient largely asymptomatic  Lactic acid within normal limits.  No evidence of hypoperfusion  Monitor off IV fluids  No prior history of seizures.  Suspect in setting of cocaine use  Seizure precautions     #Electrolyte abnormalities  Replace electrolytes per protocol    VTE Prophylaxis:  Pharmacologic VTE prophylaxis orders are present.             Disposition Planning:     Barriers to Discharge: Psych workup   Anticipated Date of Discharge: TBD  Place of Discharge: Home      Time: 45 minutes     Code Status and Medical Interventions: CPR (Attempt to Resuscitate); Full Support   Ordered at: 06/14/25 0955     Code Status (Patient has no pulse and is not breathing):    CPR (Attempt to Resuscitate)     Medical Interventions (Patient has pulse or is breathing):    Full Support     Level Of Support Discussed With:    Patient       Signature: Electronically signed by Jordi Christianson MD, 06/16/25, 12:00 EDT.  [facility] Hospitalist Team      Electronically signed by Jordi Christianson MD at 06/16/25 1201       Xenia Benton APRN at 06/16/25 1145            Chief complaint depression, hallucinations     Subjective .     History of present illness:  The patient is a 34 y.o. female who was admitted secondary to  seizures. PMHx:bipolar, schizophrenia .  Psychiatric consult was requested by Junior Prajapati MD secondary to schizophrenia, hallucinations and benzodiazepine withdrawal.  The patient was unable to provide any information secondary to decreased level of consciousness.  According to the records, the patient had witnessed grand mal seizure activity.  Family expressed concerns that the patient had not been taking medications, she has no history of schizophrenia, the patient experienced hallucinations, she was feeling paranoid, believes people were coming to get her to.  The patient sees Batool Carter in virtual clinic.  Next  "appointment 7/1/2025, the patient canceled appointment on 5/21/2025, but she requested to increase Valium.  Past psychiatric history significant for schizophrenia, anxiety, panic disorder, trichotillomania  The patient was on Abilify Maintena, olanzapine and diazepam  Past medications: Alprazolam, Lexapro, lamotrigine, Saphris, Risperdal, Prozac, Zoloft, Vraylar, Seroquel, Invega.    6/16/25: Patient seen today for the first time by this provider. She was alert and oriented to questions. She denied any suicidal ideation, homicidal ideation. She did not display any inappropriate behavior during my assessment.     However, later in the day, staff reported that the patient \"talk to her self\" and appeared to be responding to internal stimuli. Both the  and nursing staff witnessed this.     No family at bedside to obtain collateral.       Review of Systems   All systems were reviewed and negative except for:  Behavioral/Psych: positive for  depression and hallucinations    The following portions of the patient's history were reviewed and updated as appropriate: allergies, current medications, past family history, past medical history, past social history, past surgical history and problem list.    History    Past psychiatric history : schizophrenia        Medications Prior to Admission   Medication Sig Dispense Refill Last Dose/Taking    ARIPiprazole ER (Abilify Maintena) 400 MG prefilled syringe IM prefilled syringe Inject 400 mg into the appropriate muscle as directed by prescriber Every 28 (Twenty-Eight) Days. 1 each 5 Taking    gabapentin (NEURONTIN) 800 MG tablet Take 1 tablet by mouth 4 (Four) Times a Day. 360 tablet 0 Taking    cloNIDine (CATAPRES) 0.2 MG tablet Take 1 tablet by mouth 2 (Two) Times a Day. for anxiety 60 tablet 1     diazePAM (VALIUM) 10 MG tablet Take 1 tablet by mouth 3 (Three) Times a Day As Needed for Anxiety. for anxiety 90 tablet 0     docusate sodium (Colace) 100 MG capsule Take " "1 capsule by mouth 2 (Two) Times a Day. 60 capsule 1     medroxyPROGESTERone (DEPO-PROVERA) 150 MG/ML injection Inject 1 mL into the appropriate muscle as directed by prescriber Every 3 (Three) Months. 1 mL 3     OLANZapine (zyPREXA) 10 MG tablet Take 1 tablet by mouth 2 (Two) Times a Day. 180 tablet 1     rizatriptan (MAXALT) 5 MG tablet Take 1 tablet by mouth 1 (One) Time As Needed for Migraine. May repeat in 2 hours if needed 12 tablet 2         Scheduled Meds:  diazePAM, 5 mg, Oral, TID  enoxaparin sodium, 40 mg, Subcutaneous, Q24H  gabapentin, 800 mg, Oral, Q8H  OLANZapine, 10 mg, Oral, BID  sodium chloride, 10 mL, Intravenous, Q12H         Continuous Infusions:       PRN Meds:    acetaminophen    senna-docusate sodium **AND** polyethylene glycol **AND** bisacodyl **AND** bisacodyl    Calcium Replacement - Follow Nurse / BPA Driven Protocol    diazePAM    Magnesium Standard Dose Replacement - Follow Nurse / BPA Driven Protocol    methocarbamol    ondansetron ODT **OR** ondansetron    Phosphorus Replacement - Follow Nurse / BPA Driven Protocol    Potassium Replacement - Follow Nurse / BPA Driven Protocol    [COMPLETED] Insert Peripheral IV **AND** sodium chloride    sodium chloride    sodium chloride    SUMAtriptan    traMADol      Allergies:  Morphine, Haloperidol, Aluminum silicate, and Pedi-pre tape spray [wound dressing adhesive]      Objective     Vital Signs   /42 (BP Location: Left arm, Patient Position: Lying)   Pulse 65   Temp 97.5 °F (36.4 °C) (Oral)   Resp 16   Ht 167.6 cm (66\")   Wt 72.7 kg (160 lb 4.4 oz)   SpO2 95%   BMI 25.87 kg/m²     Physical Exam:  MENTAL STATUS EXAM   General Appearance:  Cleanly groomed and dressed  Eye Contact:  Fair  Attitude:  Guarded  Speech:  Minimal spontaneity  Language:  Unspontaneous  Mood and affect:  Flat  Hopelessness:  Denies  Loneliness: Denies  Thought Process:  Linear  Associations/ Thought Content:  No delusions  Hallucinations:  Other  Other " Comment:  Not observed by this provider, but other staff report patient does appear to be responding to internal stimuli at times.  Suicidal Ideations:  Not present  Homicidal Ideation:  Not present  Sensorium:  Alert  Orientation:  Person, place and time  Immediate Recall, Recent, and Remote Memory:  Intact  Attention Span/ Concentration:  Easily distracted and selective attention  Fund of Knowledge:  Limited  Insight:  Limited  Judgement:  Limited  Reliability:  Poor  Impulse Control:  Poor        Medications and allergies reviewed.    Result Review:  I have personally reviewed the results from the time of this admission to 6/16/2025 17:35 EDT and agree with these findings:  [x]  Laboratory  []  Microbiology  []  Radiology  [x]  EKG/Telemetry   []  Cardiology/Vascular   []  Pathology  []  Old records  []  Other:  Most notable findings include:     Assessment & Plan       Psychosis       Assessment: paranoid schizophrenia, generalized anxiety disorder, panic attacks, cocaine abuse.   Treatment Plan: The patient presented with seizure activity initially and appeared to be responding to internal stimuli. The patient is on long acting abilify Maintena-unable to confirm date of last injection. No emergency contacts on file, outside of patient's mother. Attempted to reach but no answer.     Suspected, from symptoms, that patient may have missed last injection, but no way to confirm.     Continue olanzapine, increase to 15mg twice daily.   Continue diazepam 5mg PO TID to avoid benzo withdrawal and seizures.     Continue supportive treatment.     Our service will follow.   Treatment Plan discussed with: Patient    I discussed the patients findings and my recommendations with patient    I have reviewed and approved the behavioral health treatment plans and problem list. Yes     Referring MD has access to consult report and progress notes in EMR     VENKAT Tolentino  06/16/25  17:35  EDT              Electronically signed by Xenia Benton APRN at 06/16/25 1803       Sandi Esparza MD at 06/15/25 1515            Chief complaint depression, voices     Subjective .     History of present illness:  The patient is a 34 y.o. female who was admitted secondary to seizures. PMHx:bipolar, schizophrenia .  Psychiatric consult was requested by Junior Prajapati MD secondary to schizophrenia, hallucinations and benzodiazepine withdrawal.  The patient was unable to provide any information secondary to decreased level of consciousness.  According to the records, the patient had witnessed grand mal seizure activity.  Family expressed concerns that the patient had not been taking medications, she has no history of schizophrenia, the patient experienced hallucinations, she was feeling paranoid, believes people were coming to get her to.  The patient sees Batool Carter in virtual clinic.  Next appointment 7/1/2025, the patient canceled appointment on 5/21/2025, but she requested to increase Valium.  Past psychiatric history significant for schizophrenia, anxiety, panic disorder, trichotillomania  The patient was on Abilify Maintena, olanzapine and diazepam  Past medications: Alprazolam, Lexapro, lamotrigine, Saphris, Risperdal, Prozac, Zoloft, Vraylar, Seroquel, Invega    Today the pt  was somnolent but arousable, reported using cocaine recently, noticed increased intensity of voices when intoxicated   Depression 6/10, denied feeling hopeless/helpless   AH - just noises, denied hearing commands to harm self or others  Review of Systems   Pertinent items are noted in HPI, all other systems reviewed and negative     History     Medications Prior to Admission   Medication Sig Dispense Refill Last Dose/Taking    ARIPiprazole ER (Abilify Maintena) 400 MG prefilled syringe IM prefilled syringe Inject 400 mg into the appropriate muscle as directed by prescriber Every 28 (Twenty-Eight) Days. 1 each 5 Taking     "gabapentin (NEURONTIN) 800 MG tablet Take 1 tablet by mouth 4 (Four) Times a Day. 360 tablet 0 Taking    cloNIDine (CATAPRES) 0.2 MG tablet Take 1 tablet by mouth 2 (Two) Times a Day. for anxiety 60 tablet 1     diazePAM (VALIUM) 10 MG tablet Take 1 tablet by mouth 3 (Three) Times a Day As Needed for Anxiety. for anxiety 90 tablet 0     docusate sodium (Colace) 100 MG capsule Take 1 capsule by mouth 2 (Two) Times a Day. 60 capsule 1     medroxyPROGESTERone (DEPO-PROVERA) 150 MG/ML injection Inject 1 mL into the appropriate muscle as directed by prescriber Every 3 (Three) Months. 1 mL 3     OLANZapine (zyPREXA) 10 MG tablet Take 1 tablet by mouth 2 (Two) Times a Day. 180 tablet 1     rizatriptan (MAXALT) 5 MG tablet Take 1 tablet by mouth 1 (One) Time As Needed for Migraine. May repeat in 2 hours if needed 12 tablet 2         Scheduled Meds:  diazePAM, 5 mg, Oral, TID  enoxaparin sodium, 40 mg, Subcutaneous, Q24H  gabapentin, 800 mg, Oral, Q8H  OLANZapine, 10 mg, Oral, BID  sodium chloride, 10 mL, Intravenous, Q12H         Continuous Infusions:       PRN Meds:    acetaminophen    senna-docusate sodium **AND** polyethylene glycol **AND** bisacodyl **AND** bisacodyl    Calcium Replacement - Follow Nurse / BPA Driven Protocol    diazePAM    Magnesium Standard Dose Replacement - Follow Nurse / BPA Driven Protocol    methocarbamol    ondansetron ODT **OR** ondansetron    Phosphorus Replacement - Follow Nurse / BPA Driven Protocol    Potassium Replacement - Follow Nurse / BPA Driven Protocol    [COMPLETED] Insert Peripheral IV **AND** sodium chloride    sodium chloride    sodium chloride    SUMAtriptan    traMADol      Allergies:  Morphine, Haloperidol, Aluminum silicate, and Pedi-pre tape spray [wound dressing adhesive]      Objective     Vital Signs   BP 97/65   Pulse 92   Temp 98.3 °F (36.8 °C) (Oral)   Resp 16   Ht 167.6 cm (66\")   Wt 76 kg (167 lb 8.8 oz)   LMP  (LMP Unknown)   SpO2 97%   BMI 27.04 kg/m² " "    Physical Exam:     General Appearance:    Somnolent but arousable         Mental Status Exam:    Hygiene:   fair  Cooperation:  Cooperative  Eye Contact:  Fair  Psychomotor Behavior:  Slow  Affect:  Restricted  Hopelessness: Denies  Speech:  Monotone  Thought Progress:  Goal directed  Thought Content:  Mood congruent  Suicidal:  None  Homicidal:  None  Hallucinations:  Auditory - just noises, no commands   Delusion:  None  Memory:  fair   Orientation:  Person, Place, and Situation  Reliability:  fair  Insight:  Fair  Judgement:  Impaired  Impulse Control:  Poor  Physical/Medical Issues:  Yes      Medications and allergies reviewed      Lab Results   Component Value Date    GLUCOSE 107 (H) 06/15/2025    CALCIUM 8.1 (L) 06/15/2025     06/15/2025    K 3.9 06/15/2025    CO2 24.5 06/15/2025     (H) 06/15/2025    BUN 16.6 06/15/2025    CREATININE 0.59 06/15/2025    EGFRIFNONA 83 02/06/2021    BCR 28.1 (H) 06/15/2025    ANIONGAP 8.5 06/15/2025       Last Urine Toxicity  More data exists         Latest Ref Rng & Units 6/14/2025 12/18/2023   LAST URINE TOXICITY RESULTS   Creatinine, Urine 20.0 - 300.0 mg/dL - 52.6    Amphetamine, Urine Qual Negative Negative  Negative    Barbiturates Screen, Urine Negative Negative  Negative    Benzodiazepine Screen, Urine Negative Positive  Negative    Buprenorphine, Screen, Urine Negative Negative  -   Cocaine Screen, Urine Negative Positive  Negative    Fentanyl, Urine Nrppua=4256 pg/mL - Negative    Methadone Screen , Urine Negative Negative  Negative    Methamphetamine, Ur Negative Negative  -       No results found for: \"PHENYTOIN\", \"PHENOBARB\", \"VALPROATE\", \"CBMZ\"    Lab Results   Component Value Date     06/15/2025    BUN 16.6 06/15/2025    CREATININE 0.59 06/15/2025    TSH 1.240 01/11/2024    WBC 6.51 06/15/2025       Brief Urine Lab Results  (Last result in the past 365 days)        Color   Clarity   Blood   Leuk Est   Nitrite   Protein   CREAT   Urine HCG     "    25 0855 Yellow   Cloudy   Negative   Negative   Negative   30 mg/dL (1+)                   Assessment & Plan       Psychosis       Assessment: Paranoid schizophrenia, generalized anxiety disorder, panic attacks, cocaine abuse   Treatment Plan: The patient presented with seizure activity, she appeared to be responding to internal stimuli, experienced auditory and visual hallucinations most likely related to cocaine use  The patient is on long-acting injectable Abilify Maintena-unable to confirm the date of last injection, unable to verify, the pt does not remember date when she received it , no emergency contacts on file  Last refill for Abilify Maintena was sent to the pharmacy on 2025, but it is unknown when it was administered.  Continue olanzapine  Diazepam 10 mg #90 was filled on 2025 according to [facility]  Restart diazepam 5 mg po TID to avoid benzo withdrawal and SZ   Cont to provide support,will follow   Treatment Plan discussed with: Patient and nursing     I discussed the patients findings and my recommendations with patient and nursing staff    I have reviewed and approved the behavioral health treatment plans and problem list. Yes     Referring MD has access to consult report and progress notes in EMR     Sandi Esparza MD  06/15/25  15:15 EDT              Electronically signed by Sandi Esparza MD at 06/15/25 1522       Jordi Christianson MD at 06/15/25 0826              [facility] MEDICINE SERVICE  DAILY PROGRESS NOTE    NAME: Annmarie Perera  : 1990  MRN: [id number]      LOS: 0 days     PROVIDER OF SERVICE: Jordi Christianson MD    Chief Complaint: Psychosis    Subjective:     Interval History:  History taken from: patient chart family RN    Laying in bed no lightheadedness shortness of breath        Review of Systems:   Review of Systems  All negative except as above  Objective:     Vital Signs  Temp:  [97.5 °F (36.4 °C)-98.3 °F (36.8 °C)] 98.1 °F (36.7 °C)  Heart  Rate:  [59-90] 67  Resp:  [12-16] 16  BP: ()/(41-68) 99/52   Body mass index is 27.04 kg/m².    Physical Exam  Physical Exam  AO x 3 anxious  RRR S1-S2 normal  Lungs with fair air entry  Abdomen soft nontender nondistended     Diagnostic Data    Results from last 7 days   Lab Units 06/15/25  0328 06/14/25  0827   WBC 10*3/mm3 6.51 13.48*   HEMOGLOBIN g/dL 11.5* 12.9   HEMATOCRIT % 35.2 40.4   PLATELETS 10*3/mm3 273 298   GLUCOSE mg/dL 107* 102*   CREATININE mg/dL 0.59 0.83   BUN mg/dL 16.6 24.0*   SODIUM mmol/L 141 140   POTASSIUM mmol/L 3.9 3.9   AST (SGOT) U/L  --  17   ALT (SGPT) U/L  --  12   ALK PHOS U/L  --  70   BILIRUBIN mg/dL  --  0.2   ANION GAP mmol/L 8.5 12.1       CT Head Without Contrast  Result Date: 6/14/2025  Impression: Suboptimal exam due to motion degradation. Within these confines, no gross evidence of acute intracranial process. Electronically Signed: Justen Abrams MD  6/14/2025 8:57 AM EDT  Workstation ID: [id number]        I reviewed the patient's new clinical results.  I reviewed the patient's new imaging results and agree with the interpretation.    Assessment/Plan:     Active and Resolved Problems  Active Hospital Problems    Diagnosis  POA    **Psychosis [F29]  Yes      Resolved Hospital Problems   No resolved problems to display.       34-year-old female with history of bipolar disorder, schizophrenia admitted to [facility] 6/14 with seizure-like activity    #Paranoid schizophrenia  #NAINA  #Cocaine abuse  U-Tox positive for cocaine and benzodiazepines.  On diazepam at home    Psych following  Currently on olanzapine, diazepam and gabapentin  Counseling once more amenable    #Hypotension  BP soft patient largely asymptomatic  Lactic acid within normal limits.  No evidence of hypoperfusion  Continue IV fluids  No prior history of seizures.  Suspect in setting of cocaine use  Seizure precautions    #Electrolyte abnormalities  Replace lites check        VTE Prophylaxis:  Pharmacologic  VTE prophylaxis orders are present.             Disposition Planning:     Barriers to Discharge: Medical/psych workup  Anticipated Date of Discharge: 1 to 2 days  Place of Discharge: Home      Time: 45 minutes     Code Status and Medical Interventions: CPR (Attempt to Resuscitate); Full Support   Ordered at: 06/14/25 0955     Code Status (Patient has no pulse and is not breathing):    CPR (Attempt to Resuscitate)     Medical Interventions (Patient has pulse or is breathing):    Full Support     Level Of Support Discussed With:    Patient       Signature: Electronically signed by Jordi Christianson MD, 06/15/25, 08:26 EDT.  [facility] Hospitalist Team      Electronically signed by Jordi Christianson MD at 06/15/25 1231          Consult Notes (last 4 days)        Sandi Esparza MD at 06/14/25 1458        Consult Orders    1. Psych (on-call MD unless specified) [[id number]] ordered by Beau Aguilar MD at 06/14/25 0928                   Referring Provider: Junior Prajapati MD   Reason for Consultation: schizophrenia, hallucinations, benzo withdrawal     Chief complaint the patient was unable to express any complaints secondary to decreased level of conscious    Subjective .     History of present illness:  The patient is a 34 y.o. female who was admitted secondary to seizures. PMHx:bipolar, schizophrenia .  Psychiatric consult was requested by Junior Prajapati MD secondary to schizophrenia, hallucinations and benzodiazepine withdrawal.  The patient was unable to provide any information secondary to decreased level of consciousness.  According to the records, the patient had witnessed grand mal seizure activity.  Family expressed concerns that the patient had not been taking medications, she has no history of schizophrenia, the patient experienced hallucinations, she was feeling paranoid, believes people were coming to get her to.  The patient sees Batool Carter in virtual clinic.  Next appointment 7/1/2025, the  patient canceled appointment on 5/21/2025, but she requested to increase Valium.  Past psychiatric history significant for schizophrenia, anxiety, panic disorder, trichotillomania  The patient was on Abilify Maintena, olanzapine and diazepam  Past medications: Alprazolam, Lexapro, lamotrigine, Saphris, Risperdal, Prozac, Zoloft, Vraylar, Seroquel, Invega      Review of Systems   Review of systems could not be obtained due to   patient sedation status.    History    Past Medical History:   Diagnosis Date    Abdominal pain, epigastric 06/20/2017    Borderline personality disorder     Chronic pain syndrome 09/11/2017    Contraceptive management 01/23/2018    Dysuria 11/30/2017    Genital herpes 11/20/2017    High risk sexual behavior 11/20/2017    HPV (human papilloma virus) anogenital infection     Irregular menstruation 11/30/2017    refer to OB/GYN for further management 11-    Migraine     Panic disorder     PONV (postoperative nausea and vomiting)     Poor compliance with medication 02/21/2019    Positive skin test for tuberculosis     Tuberculosis positive. STATES CXR NORMAL NEVER TREATED WITH MEDS    Pyelonephritis, acute 06/20/2017    Improved 06-    Right knee pain 01/23/2018    Discussed symptomatic treatment, activity as tolerate. 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    Vaginitis 11/20/2017    Withdrawal symptoms, drug or narcotic           Family History   Problem Relation Age of Onset    Other Mother         Tumor    Breast cancer Maternal Aunt     Breast cancer Maternal Grandmother     Ovarian cancer Neg Hx     Uterine cancer Neg Hx     Colon cancer Neg Hx     Malig Hyperthermia Neg Hx         Social History     Tobacco Use    Smoking status: Every Day     Current packs/day: 2.00     Average packs/day: 2.0 packs/day for 15.0 years (30.0  ttl pk-yrs)     Types: Cigarettes    Smokeless tobacco: Never    Tobacco comments:     Passive Smoke: Y   Vaping Use    Vaping status: Former    Substances: Flavoring    Devices: Disposable, Pre-filled pod   Substance Use Topics    Alcohol use: No    Drug use: Not Currently     Types: Cocaine(coke), Benzodiazepines, Oxycodone, Hydrocodone, Marijuana     Comment: 9 years sober          Medications Prior to Admission   Medication Sig Dispense Refill Last Dose/Taking    ARIPiprazole ER (Abilify Maintena) 400 MG prefilled syringe IM prefilled syringe Inject 400 mg into the appropriate muscle as directed by prescriber Every 28 (Twenty-Eight) Days. 1 each 5 Taking    gabapentin (NEURONTIN) 800 MG tablet Take 1 tablet by mouth 4 (Four) Times a Day. 360 tablet 0 Taking    cloNIDine (CATAPRES) 0.2 MG tablet Take 1 tablet by mouth 2 (Two) Times a Day. for anxiety 60 tablet 1     diazePAM (VALIUM) 10 MG tablet Take 1 tablet by mouth 3 (Three) Times a Day As Needed for Anxiety. for anxiety 90 tablet 0     docusate sodium (Colace) 100 MG capsule Take 1 capsule by mouth 2 (Two) Times a Day. 60 capsule 1     medroxyPROGESTERone (DEPO-PROVERA) 150 MG/ML injection Inject 1 mL into the appropriate muscle as directed by prescriber Every 3 (Three) Months. 1 mL 3     OLANZapine (zyPREXA) 10 MG tablet Take 1 tablet by mouth 2 (Two) Times a Day. 180 tablet 1     rizatriptan (MAXALT) 5 MG tablet Take 1 tablet by mouth 1 (One) Time As Needed for Migraine. May repeat in 2 hours if needed 12 tablet 2         Scheduled Meds:  enoxaparin sodium, 40 mg, Subcutaneous, Q24H  gabapentin, 800 mg, Oral, Q8H  OLANZapine, 10 mg, Oral, BID  sodium chloride, 10 mL, Intravenous, Q12H         Continuous Infusions:  lactated ringers, 100 mL/hr, Last Rate: 100 mL/hr (06/14/25 1013)        PRN Meds:    acetaminophen    senna-docusate sodium **AND** polyethylene glycol **AND** bisacodyl **AND** bisacodyl    Calcium Replacement - Follow Nurse / BPA Driven  "Protocol    diazePAM    Magnesium Standard Dose Replacement - Follow Nurse / BPA Driven Protocol    methocarbamol    ondansetron ODT **OR** ondansetron    Phosphorus Replacement - Follow Nurse / BPA Driven Protocol    Potassium Replacement - Follow Nurse / BPA Driven Protocol    [COMPLETED] Insert Peripheral IV **AND** sodium chloride    sodium chloride    sodium chloride    SUMAtriptan    traMADol      Allergies:  Morphine, Haloperidol, Aluminum silicate, and Pedi-pre tape spray [wound dressing adhesive]      Objective     Vital Signs   /68   Pulse 88   Temp 98.6 °F (37 °C) (Oral)   Resp 17   Ht 167.6 cm (66\")   Wt 76.3 kg (168 lb 3.4 oz)   SpO2 95%   BMI 27.15 kg/m²     Physical Exam:    Musculoskeletal:   Muscle strength and tone: Decreased in upper extremities  Abnormal Movements: None observed  Gait: Unable to assess, patient was in bed sleeping     General Appearance:  No acute distress     Mental Status Exam:   Hygiene:   fair  Cooperation:  Unable to cooperate  Eye Contact:  Closed  Behavior and Psychomotor Activity: Slow  Speech:  The patient was sleeping  Mood: Unable to answer  Affect:  Restricted  Thought Process:  Unable to demonstrate  Associations: Unable to assess  Thought Content:  Unable to assess  Language: Unable to assess, the patient was sleeping  Suicidal Ideations:  Did not express  Homicidal:  None  Hallucinations:  Auditory and Visual  Delusion:  Unable to demonstrate  Orientation:  Unable to evaluate  Memory:  Unable to evaluate  Concentration and computation: Unable to assess  Attention span: Unable to assess  Fund of knowledge: Unable to assess at present time  Reliability:  poor  Insight:  Poor  Judgement:  Impaired  Impulse Control:  Poor      Medications and allergies reviewed      Lab Results   Component Value Date    GLUCOSE 102 (H) 06/14/2025    CALCIUM 9.0 06/14/2025     06/14/2025    K 3.9 06/14/2025    CO2 23.9 06/14/2025     06/14/2025    BUN 24.0 (H) " "06/14/2025    CREATININE 0.83 06/14/2025    EGFRIFNONA 83 02/06/2021    BCR 28.9 (H) 06/14/2025    ANIONGAP 12.1 06/14/2025       Last Urine Toxicity  More data exists         Latest Ref Rng & Units 6/14/2025 12/18/2023   LAST URINE TOXICITY RESULTS   Creatinine, Urine 20.0 - 300.0 mg/dL - 52.6    Amphetamine, Urine Qual Negative Negative  Negative    Barbiturates Screen, Urine Negative Negative  Negative    Benzodiazepine Screen, Urine Negative Positive  Negative    Buprenorphine, Screen, Urine Negative Negative  -   Cocaine Screen, Urine Negative Positive  Negative    Fentanyl, Urine Wqejft=0533 pg/mL - Negative    Methadone Screen , Urine Negative Negative  Negative    Methamphetamine, Ur Negative Negative  -       No results found for: \"PHENYTOIN\", \"PHENOBARB\", \"VALPROATE\", \"CBMZ\"    Lab Results   Component Value Date     06/14/2025    BUN 24.0 (H) 06/14/2025    CREATININE 0.83 06/14/2025    TSH 1.240 01/11/2024    WBC 13.48 (H) 06/14/2025       Brief Urine Lab Results  (Last result in the past 365 days)        Color   Clarity   Blood   Leuk Est   Nitrite   Protein   CREAT   Urine HCG        06/14/25 0855 Yellow   Cloudy   Negative   Negative   Negative   30 mg/dL (1+)                   Assessment & Plan       Psychosis     Assessment: Paranoid schizophrenia, generalized anxiety disorder, panic attacks, cocaine abuse  Treatment Plan: The patient presented with seizure activity, she appeared to be responding to internal stimuli, experienced auditory and visual hallucinations most likely related to cocaine use  The patient is on long-acting injectable Abilify Maintena-unable to confirm the date of last injection, unable to verify, no emergency contacts on file  Last refill for Abilify Maintena was sent to the pharmacy on 5/19/2025, but it is unknown when it was administered.  Continue olanzapine  Diazepam 10 mg #90 was filled on 5/21/2025 according to [facility]  Restart diazepam 5 mg po TID   Cont to provide " support,will follow   Treatment Plan discussed with: nursing     I discussed the patients findings and my recommendations with nursing staff    I have reviewed and approved the behavioral health treatment plans and problem list. Yes  Thank you for the consult   Referring MD has access to consult report and progress notes in EMR       This document has been electronically signed by Sandi Esparza MD  June 14, 2025 14:59 EDT    Part of this note may be an electronic transcription/translation of spoken language to printed text using the Dragon Dictation System.        Electronically signed by Sandi Esparza MD at 06/14/25 1747

## 2025-06-17 NOTE — DISCHARGE SUMMARY
[facility] Medicine Services  Discharge Summary    Date of Service: 2025  Patient Name: Annmarie Perera  : 1990  MRN: [id number]    Date of Admission: 2025  Discharge Diagnosis: #Paranoid schizophrenia  #NAINA  #Cocaine abuse    Date of Discharge: 2025  Primary Care Physician: Meg Huff APRN      Presenting Problem:   Psychosis [F29]  Schizophrenia, unspecified type [F20.9]  Benzodiazepine withdrawal with complication [F13.939]    Active and Resolved Hospital Problems:  Active Hospital Problems    Diagnosis POA    **Psychosis [F29] Yes      Resolved Hospital Problems   No resolved problems to display.         Hospital Course     HPI:  Admitting team HPI    34 y.o. female with a CMH of bipolar, schizophrenia who presented to [facility] on 2025 with Seizures (EMS reports that pt's family witnessed grand mal seizure activity, pt alert and oriented to self only, pt denies head or neck pain.     Hospital Course:  34-year-old female with history of bipolar disorder, schizophrenia admitted to [facility]  with seizure-like activity     #Paranoid schizophrenia  #NAINA  #Cocaine abuse  U-Tox positive for cocaine and benzodiazepines.  On diazepam at home     Seen by psych. Appreciate reccs olanzapine dose increased  To continue IM Abilify along with p.o. gabapentin  Diazepam 5 mg 3 times daily as needed  Counseling on drug abuse     #Hypotension  BP soft patient largely asymptomatic  Lactic acid within normal limits.  No evidence of hypoperfusion  Monitor off IV fluids  No prior history of seizures.  Suspect in setting of cocaine use       #Electrolyte abnormalities  Replaced      DISCHARGE Follow Up Recommendations for labs and diagnostics:     Follow up with Psych       Day of Discharge     Vital Signs:  Temp:  [97.5 °F (36.4 °C)-98.2 °F (36.8 °C)] 98 °F (36.7 °C)  Heart Rate:  [59-67] 67  Resp:  [13-20] 13  BP: (100-108)/(42-53) 106/53    Physical  Exam:  Physical Exam   AO x 3 NAD  RRR S1-S2 normal  Lungs with fair air entry  Abdomen soft nontender nondistended         Pertinent  and/or Most Recent Results     LAB RESULTS:      Lab 06/17/25  0204 06/16/25  0400 06/15/25  1151 06/15/25  0328 06/14/25  0827   WBC 7.71 6.94  --  6.51 13.48*   HEMOGLOBIN 12.4 12.6  --  11.5* 12.9   HEMATOCRIT 38.6 38.3  --  35.2 40.4   PLATELETS 304 281  --  273 298   NEUTROS ABS 4.70 4.09  --  3.65 11.88*   IMMATURE GRANS (ABS) 0.04 0.01  --  0.02 0.07*   LYMPHS ABS 2.13 2.16  --  2.23 0.88   MONOS ABS 0.65 0.51  --  0.47 0.59   EOS ABS 0.15 0.13  --  0.11 0.03   MCV 97.2* 97.0  --  96.7 98.8*   LACTATE  --   --  1.0  --   --          Lab 06/17/25  0204 06/16/25  0400 06/15/25  0328 06/14/25  0827   SODIUM 139 139 141 140   POTASSIUM 4.1 4.3 3.9 3.9   CHLORIDE 105 106 108* 104   CO2 23.0 24.6 24.5 23.9   ANION GAP 11.0 8.4 8.5 12.1   BUN 25.0* 14.2 16.6 24.0*   CREATININE 0.73 0.72 0.59 0.83   EGFR 109.5 111.3 120.0 93.8   GLUCOSE 128* 92 107* 102*   CALCIUM 8.9 8.8 8.1* 9.0         Lab 06/14/25  0827   TOTAL PROTEIN 7.2   ALBUMIN 4.7   GLOBULIN 2.5   ALT (SGPT) 12   AST (SGOT) 17   BILIRUBIN 0.2   ALK PHOS 70                     Brief Urine Lab Results  (Last result in the past 365 days)        Color   Clarity   Blood   Leuk Est   Nitrite   Protein   CREAT   Urine HCG        06/14/25 0855 Yellow   Cloudy   Negative   Negative   Negative   30 mg/dL (1+)                 Microbiology Results (last 10 days)       ** No results found for the last 240 hours. **            CT Head Without Contrast  Result Date: 6/14/2025  Impression: Impression: Suboptimal exam due to motion degradation. Within these confines, no gross evidence of acute intracranial process. Electronically Signed: Justen Abrams MD  6/14/2025 8:57 AM EDT  Workstation ID: [id number]      Results for orders placed during the hospital encounter of 01/18/22    Duplex venous lower extremity left CAR    Interpretation  Summary  · Normal left lower extremity venous duplex scan.      Results for orders placed during the hospital encounter of 01/18/22    Duplex venous lower extremity left CAR    Interpretation Summary  · Normal left lower extremity venous duplex scan.          Labs Pending at Discharge:  Pending Results       None            Procedures Performed           Consults:   Consults       Date and Time Order Name Status Description    6/14/2025  9:28 AM Hospitalist (on-call MD unless specified)                Discharge Details        Discharge Medications        Changes to Medications        Instructions Start Date   diazePAM 5 MG tablet  Commonly known as: VALIUM  What changed:   medication strength  how much to take  when to take this   5 mg, Oral, Every 8 Hours PRN, for anxiety      OLANZapine 15 MG tablet  Commonly known as: zyPREXA  What changed:   medication strength  how much to take   15 mg, Oral, 2 Times Daily             Continue These Medications        Instructions Start Date   Abilify Maintena 400 MG prefilled syringe IM prefilled syringe  Generic drug: ARIPiprazole ER   400 mg, Intramuscular, Every 28 Days      docusate sodium 100 MG capsule  Commonly known as: Colace   100 mg, Oral, 2 Times Daily      gabapentin 800 MG tablet  Commonly known as: NEURONTIN   800 mg, Oral, 4 Times Daily      medroxyPROGESTERone 150 MG/ML injection  Commonly known as: DEPO-PROVERA   150 mg, Intramuscular, Every 3 Months      rizatriptan 5 MG tablet  Commonly known as: MAXALT   5 mg, Oral, Once As Needed, May repeat in 2 hours if needed             Stop These Medications      cloNIDine 0.2 MG tablet  Commonly known as: CATAPRES              Allergies   Allergen Reactions    Morphine Irritability    Haloperidol Mental Status Change and Other (See Comments)     Abstraction from [employer]    Aluminum Silicate Unknown - Low Severity    Pedi-Pre Tape Spray [Wound Dressing Adhesive] Rash         Discharge Disposition:   Home or Self  Care    Diet:  Hospital:  Diet Order   Procedures    Diet: Regular/House; Fluid Consistency: Thin (IDDSI 0)         Discharge Activity:         CODE STATUS:  Code Status and Medical Interventions: CPR (Attempt to Resuscitate); Full Support   Ordered at: 06/14/25 0955     Code Status (Patient has no pulse and is not breathing):    CPR (Attempt to Resuscitate)     Medical Interventions (Patient has pulse or is breathing):    Full Support     Level Of Support Discussed With:    Patient         Future Appointments   Date Time Provider Department Center   7/1/2025  2:30 PM Batool Carter, LUDA [facility]  [facility]           Time spent on Discharge including face to face service:  45 minutes    Signature: Electronically signed by Jordi Christianson MD, 06/17/25, 10:54 EDT.  [facility] Hospitalist Team

## 2025-06-17 NOTE — OUTREACH NOTE
Prep Survey      Flowsheet Row Responses   [facility]? [facility]   Is LACE score < 7 ? Yes   Eligibility Not Eligible   What are the reasons patient is not eligible? Behavioral Health  [psychosis]   Does the patient have one of the following disease processes/diagnoses(primary or secondary)? Other   Prep survey completed? Yes            Arlin ROSEN - Registered Nurse           Spontaneous

## 2025-06-19 NOTE — PAYOR COMM NOTE
"  DISCHARGE NOTICE --  [id number]       This patient discharged HOME on 6/17/25.  Please advise if additional information is needed to finalize this request.    Thank you!      Joann Pop  Utilization Review Coordinator  [address]  [address]  Ph: [phone]  Fx: [phone]      Nu Mcconnell (34 y.o. Female)       Date of Birth   1990    Social Security Number       Address   [address] [place], [place] [address]    Home Phone       MRN   [id number]       Sikh   None    Marital Status   Single                            Admission Date   6/14/2025    Admission Type   Emergency    Admitting Provider   Jordi Christianson MD    Attending Provider       Department, Room/Bed   [facility] PROGRESS CARE, 2118/1       Discharge Date   6/17/2025    Discharge Disposition   Home or Self Care    Discharge Destination                                 Attending Provider: (none)   Allergies: Morphine, Haloperidol, Aluminum Silicate, Pedi-pre Tape Spray [Wound Dressing Adhesive]    Isolation: None   Infection: None   Code Status: Prior    Ht: 167.6 cm (66\")   Wt: 73.5 kg (162 lb 0.6 oz)    Admission Cmt: None   Principal Problem: Psychosis [F29]                   Active Insurance as of 6/14/2025       Primary Coverage       Payor Plan Insurance Group Employer/Plan Group    [facility] COMMUNITY PLAN [facility] COMMUNITY PLAN [facility]       Payor Plan Address Payor Plan Phone Number Payor Plan Fax Number Effective Dates    [address]   1/1/2023 - None Entered    [facility] [id number]         Subscriber Name Subscriber Birth Date Member ID       NU MCCONNELL 1990 [id number]                     Emergency Contacts        (Rel.) Home Phone Work Phone Mobile Phone    MEHDI MCCONNELL (Mother) -- -- [phone]          "

## 2025-06-23 ENCOUNTER — TELEPHONE (OUTPATIENT)
Dept: PSYCHIATRY | Facility: CLINIC | Age: 35
End: 2025-06-23
Payer: MEDICAID

## 2025-06-23 DIAGNOSIS — F63.3 TRICHOTILLOMANIA: ICD-10-CM

## 2025-06-23 DIAGNOSIS — F41.1 GENERALIZED ANXIETY DISORDER: Primary | ICD-10-CM

## 2025-06-23 DIAGNOSIS — F41.0 PANIC DISORDER: ICD-10-CM

## 2025-06-23 DIAGNOSIS — F41.1 GENERALIZED ANXIETY DISORDER: Chronic | ICD-10-CM

## 2025-06-23 DIAGNOSIS — F41.0 PANIC DISORDER: Chronic | ICD-10-CM

## 2025-06-23 RX ORDER — GABAPENTIN 800 MG/1
800 TABLET ORAL 4 TIMES DAILY
Qty: 360 TABLET | Refills: 0 | Status: SHIPPED | OUTPATIENT
Start: 2025-06-23

## 2025-06-23 RX ORDER — DIAZEPAM 10 MG/1
10 TABLET ORAL 3 TIMES DAILY PRN
Qty: 90 TABLET | Refills: 0 | Status: SHIPPED | OUTPATIENT
Start: 2025-06-23 | End: 2026-06-23

## 2025-06-23 NOTE — TELEPHONE ENCOUNTER
Pt called stated need valium  mg refilled as well, but shows discontinued Pt states she's had seizures and she can't come off valium rt now. Pt states she needs refill and then try ween off later if that's what provider wants to do. Pt request to call her back on mothers phone at [phone].    Please advise -

## 2025-06-23 NOTE — TELEPHONE ENCOUNTER
Pt called and is requesting a refill on Diazepam.    Call pt at her mother's phone number [phone]    Pt would like to use the [employer] at   Address: [facility] [address] [place], [place] [address]  Phone: [phone]

## 2025-06-24 NOTE — TELEPHONE ENCOUNTER
Attempted to call pt x2 to make her aware.  There was no answer.    Sent pt a [employer] message to make them aware.

## 2025-06-24 NOTE — TELEPHONE ENCOUNTER
Attempted to call pt to make her aware and the phone number provided for the pt mother did not have a vm to leave a message.

## 2025-07-01 ENCOUNTER — TELEMEDICINE (OUTPATIENT)
Dept: PSYCHIATRY | Facility: CLINIC | Age: 35
End: 2025-07-01
Payer: MEDICAID

## 2025-07-01 DIAGNOSIS — F63.3 TRICHOTILLOMANIA: Chronic | ICD-10-CM

## 2025-07-01 DIAGNOSIS — F41.1 GENERALIZED ANXIETY DISORDER: Chronic | ICD-10-CM

## 2025-07-01 DIAGNOSIS — F20.0 SCHIZOPHRENIA, PARANOID: Primary | Chronic | ICD-10-CM

## 2025-07-01 DIAGNOSIS — F41.0 PANIC DISORDER: Chronic | ICD-10-CM

## 2025-07-01 NOTE — PROGRESS NOTES
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 911 or go to the nearest ER should he/she begin having SI/HI. No medication side effects or related complaints today.     Patient has been doing well for the past two years, compliant with medications and follow up, no paranoia or AVH., coping with the recent loss of her father, but upset that her ex BF is not letting her see her daughter.      Continue Abilify Maintena 400 mg injection every 28 days  Continue Gabapentin 800 mg 3 times daily for mood stabilizer and anxiety.  Continue Olanzapine 10 mg tablets a full tab twice daily   Continue Valium 10 mg TID prn anxiety/sleep due to the Klonopin not as effective recently       MEDS ORDERED DURING VISIT:  New Medications Ordered This Visit   Medications    OLANZapine (zyPREXA) 10 MG tablet     Sig: Take 1 tablet by mouth 2 (Two) Times a Day for 14 days.     Dispense:  180 tablet     Refill:  1    diazePAM (VALIUM) 10 MG tablet     Sig: Take 1 tablet by mouth 3 (Three) Times a Day As Needed for Anxiety.     Dispense:  90 tablet     Refill:  0       Return in about 2 months (around 9/1/2025) for video visit.         This  document has been electronically signed by Batool Carter PA-C  July 10, 2025 17:12 EDT    EMR Dragon transcription disclaimer:  Some of this encounter note is an electronic transcription translation of spoken language to printed text. The electronic translation of spoken language may permit erroneous, or at times, nonsensical words or phrases to be inadvertently transcribed; Although I have reviewed the note for such errors some may still exist.

## 2025-07-10 RX ORDER — OLANZAPINE 10 MG/1
10 TABLET, FILM COATED ORAL 2 TIMES DAILY
Qty: 180 TABLET | Refills: 1 | Status: SHIPPED | OUTPATIENT
Start: 2025-07-10 | End: 2025-07-24

## 2025-07-10 RX ORDER — DIAZEPAM 10 MG/1
10 TABLET ORAL 3 TIMES DAILY PRN
Qty: 90 TABLET | Refills: 0 | Status: SHIPPED | OUTPATIENT
Start: 2025-07-20 | End: 2026-07-20

## 2025-07-17 DIAGNOSIS — F41.1 GENERALIZED ANXIETY DISORDER: Chronic | ICD-10-CM

## 2025-07-17 DIAGNOSIS — F63.3 TRICHOTILLOMANIA: Chronic | ICD-10-CM

## 2025-07-17 DIAGNOSIS — F41.0 PANIC DISORDER: Chronic | ICD-10-CM

## 2025-07-17 RX ORDER — DIAZEPAM 10 MG/1
10 TABLET ORAL 3 TIMES DAILY PRN
Qty: 90 TABLET | Refills: 2 | Status: SHIPPED | OUTPATIENT
Start: 2025-07-20 | End: 2026-07-20

## 2025-07-18 ENCOUNTER — PRIOR AUTHORIZATION (OUTPATIENT)
Dept: PSYCHIATRY | Facility: CLINIC | Age: 35
End: 2025-07-18
Payer: MEDICAID

## 2025-07-18 NOTE — TELEPHONE ENCOUNTER
Pt called states she has new insurance [employer] her PID # [id number]. Pt states pharmacy needs PA on her diazepam 10mg.

## (undated) DEVICE — LAPAROSCOPIC SMOKE FILTRATION SYSTEM: Brand: PALL LAPAROSHIELD® PLUS LAPAROSCOPIC SMOKE FILTRATION SYSTEM

## (undated) DEVICE — ENDOPATH XCEL BLADELESS TROCARS WITH STABILITY SLEEVES: Brand: ENDOPATH XCEL

## (undated) DEVICE — HARMONIC 700 SHEARS, ADVANCED HEMOSTASIS: Brand: HARMONIC

## (undated) DEVICE — ENDOPATH XCEL UNIVERSAL TROCAR STABLILITY SLEEVES: Brand: ENDOPATH XCEL

## (undated) DEVICE — ANTIBACTERIAL UNDYED BRAIDED (POLYGLACTIN 910), SYNTHETIC ABSORBABLE SURGICAL SUTURE: Brand: COATED VICRYL

## (undated) DEVICE — TOTAL TRAY, 16FR 10ML SIL FOLEY, URN: Brand: MEDLINE

## (undated) DEVICE — SOL NACL 0.9PCT 1000ML

## (undated) DEVICE — ENDOPATH XCEL BLUNT TIP TROCARS WITH SMOOTH SLEEVES: Brand: ENDOPATH XCEL

## (undated) DEVICE — LAPAROVUE VISIBILITY SYSTEM LAPAROSCOPIC SOLUTIONS: Brand: LAPAROVUE

## (undated) DEVICE — ANTIBACTERIAL UNDYED BRAIDED (POLYGLACTIN 910), SYNTHETIC ABSORBABLE SUTURE: Brand: COATED VICRYL

## (undated) DEVICE — SUT VIC 0 TN 27IN DYED JTN0G

## (undated) DEVICE — LOU GYN LAPAROSCOPY: Brand: MEDLINE INDUSTRIES, INC.

## (undated) DEVICE — ADHS SKIN SURG TISS VISC PREMIERPRO EXOFIN HI/VISC FAST/DRY

## (undated) DEVICE — GLV SURG BIOGEL LTX PF 6 1/2